# Patient Record
Sex: FEMALE | Race: WHITE | Employment: FULL TIME | URBAN - METROPOLITAN AREA
[De-identification: names, ages, dates, MRNs, and addresses within clinical notes are randomized per-mention and may not be internally consistent; named-entity substitution may affect disease eponyms.]

---

## 2021-02-03 ENCOUNTER — OFFICE VISIT (OUTPATIENT)
Dept: FAMILY MEDICINE CLINIC | Facility: CLINIC | Age: 31
End: 2021-02-03
Payer: COMMERCIAL

## 2021-02-03 VITALS
SYSTOLIC BLOOD PRESSURE: 122 MMHG | DIASTOLIC BLOOD PRESSURE: 76 MMHG | WEIGHT: 169.63 LBS | TEMPERATURE: 97 F | HEIGHT: 69 IN | HEART RATE: 86 BPM | BODY MASS INDEX: 25.12 KG/M2

## 2021-02-03 DIAGNOSIS — Z00.00 ROUTINE ADULT HEALTH MAINTENANCE: Primary | ICD-10-CM

## 2021-02-03 DIAGNOSIS — F32.A DEPRESSION, UNSPECIFIED DEPRESSION TYPE: ICD-10-CM

## 2021-02-03 DIAGNOSIS — M54.32 SCIATICA OF LEFT SIDE: ICD-10-CM

## 2021-02-03 PROCEDURE — 3008F BODY MASS INDEX DOCD: CPT | Performed by: FAMILY MEDICINE

## 2021-02-03 PROCEDURE — 3074F SYST BP LT 130 MM HG: CPT | Performed by: FAMILY MEDICINE

## 2021-02-03 PROCEDURE — 3078F DIAST BP <80 MM HG: CPT | Performed by: FAMILY MEDICINE

## 2021-02-03 PROCEDURE — 99385 PREV VISIT NEW AGE 18-39: CPT | Performed by: FAMILY MEDICINE

## 2021-02-03 RX ORDER — BUPROPION HYDROCHLORIDE 150 MG/1
150 TABLET, EXTENDED RELEASE ORAL 2 TIMES DAILY
COMMUNITY
End: 2021-02-03

## 2021-02-03 RX ORDER — BUPROPION HYDROCHLORIDE 150 MG/1
150 TABLET, EXTENDED RELEASE ORAL 2 TIMES DAILY
Qty: 180 TABLET | Refills: 3 | Status: SHIPPED | OUTPATIENT
Start: 2021-02-03 | End: 2021-11-04

## 2021-02-03 NOTE — PROGRESS NOTES
Gerry Bauer is a 31year old female.   Patient presents with:  Establish Care  Leg Pain: left leg pain x 2-3 months, a month ago pain worsened and has been bad ever since  Medication Request: patient needs refills      HPI:   Patient is a 67-year-old Pulse 86   Temp 97 °F (36.1 °C) (Temporal)   Ht 5' 9\" (1.753 m)   Wt 169 lb 9.6 oz (76.9 kg)   LMP 01/11/2021   BMI 25.05 kg/m²   GENERAL: well developed, well nourished,in no apparent distress  SKIN: no rashes,no suspicious lesions  HEENT: atraumatic, no

## 2021-02-04 ENCOUNTER — OFFICE VISIT (OUTPATIENT)
Dept: PHYSICAL THERAPY | Age: 31
End: 2021-02-04
Attending: FAMILY MEDICINE
Payer: COMMERCIAL

## 2021-02-04 DIAGNOSIS — M54.32 SCIATICA OF LEFT SIDE: ICD-10-CM

## 2021-02-04 PROCEDURE — 97162 PT EVAL MOD COMPLEX 30 MIN: CPT

## 2021-02-04 PROCEDURE — 97110 THERAPEUTIC EXERCISES: CPT

## 2021-02-04 NOTE — PROGRESS NOTES
SPINE EVALUATION:   Referring Physician: Dr. aBrbara Parmar  Diagnosis: Sciatica of left side (M54.32)      Date of Service: 2/4/2021     PATIENT Gina Pulido is a 31year old y/o female who presents to therapy today with complaints of L gluteal maria alejandra dysfunction versus lumbar derangement. Pt and PT discussed evaluation findings, pathology, POC and HEP. Pt voiced understanding and performs HEP correctly without reported pain.  Skilled Physical Therapy is medically necessary to address the above impairme limitations  PLAN OF CARE:    Goals: (to be met in 10 visits)     · Pt will improve transversus abdominis recruitment to perform proper isometric contraction without requiring verbal or tactile cuing to promote advancement of therex   · Pt will demonstrate

## 2021-02-09 ENCOUNTER — OFFICE VISIT (OUTPATIENT)
Dept: PHYSICAL THERAPY | Age: 31
End: 2021-02-09
Attending: FAMILY MEDICINE
Payer: COMMERCIAL

## 2021-02-09 PROCEDURE — 97110 THERAPEUTIC EXERCISES: CPT

## 2021-02-09 PROCEDURE — 97140 MANUAL THERAPY 1/> REGIONS: CPT

## 2021-02-09 NOTE — PROGRESS NOTES
Dx: Sciatica of left side (M54.32)         Insurance (Authorized # of Visits):  [employer]            Authorizing Physician: Dr. James Lopez  Next MD visit: TBD  Fall Risk: standard         Precautions: n/a             Subjective: Pt reports minimal changes thu (knee/octavio) 10x   Sdly clam shell IR with GTB 10x                 Manual:  MFR glut / lateral hip               HEP: Piriformis stretch (x2); hamstring stretch; prone press ups; clam shell (ER/IR)     Charges: 2TE; 1MM       Total Timed Treatment: 42 min

## 2021-02-11 ENCOUNTER — OFFICE VISIT (OUTPATIENT)
Dept: OBGYN CLINIC | Facility: CLINIC | Age: 31
End: 2021-02-11
Payer: COMMERCIAL

## 2021-02-11 ENCOUNTER — OFFICE VISIT (OUTPATIENT)
Dept: PHYSICAL THERAPY | Age: 31
End: 2021-02-11
Attending: FAMILY MEDICINE
Payer: COMMERCIAL

## 2021-02-11 VITALS
HEIGHT: 69 IN | BODY MASS INDEX: 25.18 KG/M2 | HEART RATE: 90 BPM | WEIGHT: 170 LBS | DIASTOLIC BLOOD PRESSURE: 84 MMHG | SYSTOLIC BLOOD PRESSURE: 137 MMHG

## 2021-02-11 DIAGNOSIS — F43.21 UNRESOLVED GRIEF: ICD-10-CM

## 2021-02-11 DIAGNOSIS — Z31.9 PATIENT DESIRES PREGNANCY: Primary | ICD-10-CM

## 2021-02-11 PROCEDURE — 3075F SYST BP GE 130 - 139MM HG: CPT | Performed by: ADVANCED PRACTICE MIDWIFE

## 2021-02-11 PROCEDURE — 97110 THERAPEUTIC EXERCISES: CPT

## 2021-02-11 PROCEDURE — 99203 OFFICE O/P NEW LOW 30 MIN: CPT | Performed by: ADVANCED PRACTICE MIDWIFE

## 2021-02-11 PROCEDURE — 3079F DIAST BP 80-89 MM HG: CPT | Performed by: ADVANCED PRACTICE MIDWIFE

## 2021-02-11 PROCEDURE — 97140 MANUAL THERAPY 1/> REGIONS: CPT

## 2021-02-11 PROCEDURE — 3008F BODY MASS INDEX DOCD: CPT | Performed by: ADVANCED PRACTICE MIDWIFE

## 2021-02-11 NOTE — PROGRESS NOTES
HPI:    Patient ID: Gerry Bauer is a 31year old female who presentdsfor consult  Regarding pregnancy planning. Pt is a  had a missed ab in November.   Had returned to menstruation in  .  Reports she is using an ovulation predictor kit and taki

## 2021-02-11 NOTE — PROGRESS NOTES
Dx: Sciatica of left side (M54.32)         Insurance (Authorized # of Visits):  [employer] PPO            Authorizing Physician: Dr. Ashlie Valles  Next MD visit: TBD  Fall Risk: standard         Precautions: n/a             Subjective: Pt reports better sitting.  How Sdly clam shell IR with GTB 10x    Ther ex:  Prone glut sets (heels) 2 x 10 5 cts  Prone passive ER/IR with applied pressure 10x  Supine SLR (ER) 2 x 10  L hamstring stretch with belt 3 x 20 cts  Bridging with SB 10x 5 cts  Piriformis stretch (crossed an

## 2021-02-16 ENCOUNTER — APPOINTMENT (OUTPATIENT)
Dept: PHYSICAL THERAPY | Age: 31
End: 2021-02-16
Attending: FAMILY MEDICINE
Payer: COMMERCIAL

## 2021-02-18 ENCOUNTER — APPOINTMENT (OUTPATIENT)
Dept: PHYSICAL THERAPY | Age: 31
End: 2021-02-18
Attending: FAMILY MEDICINE
Payer: COMMERCIAL

## 2021-02-23 ENCOUNTER — OFFICE VISIT (OUTPATIENT)
Dept: PHYSICAL THERAPY | Age: 31
End: 2021-02-23
Attending: FAMILY MEDICINE
Payer: COMMERCIAL

## 2021-02-23 PROCEDURE — 97110 THERAPEUTIC EXERCISES: CPT

## 2021-02-23 PROCEDURE — 97140 MANUAL THERAPY 1/> REGIONS: CPT

## 2021-02-23 NOTE — PROGRESS NOTES
Dx: Sciatica of left side (M54.32)         Insurance (Authorized # of Visits):  [employer] PPO            Authorizing Physician: Dr. Robbie Elizabeth  Next MD visit: TBD  Fall Risk: standard         Precautions: n/a             Subjective: Pt reports her sitting is bett cts  Bridging with pilates [place] (heel dig) 10x 5cts  Piriformis stretch 3 x 20 cts  TrA activation 10 x 5  cts  Supine hip circles CW/CCW 10x  Sdly clam shell with GTB (knee/octavio) 10x   Sdly clam shell IR with GTB 10x    Ther ex:  Prone glut sets (heels)

## 2021-02-25 ENCOUNTER — OFFICE VISIT (OUTPATIENT)
Dept: PHYSICAL THERAPY | Age: 31
End: 2021-02-25
Attending: FAMILY MEDICINE
Payer: COMMERCIAL

## 2021-02-25 PROCEDURE — 97110 THERAPEUTIC EXERCISES: CPT

## 2021-02-25 NOTE — PROGRESS NOTES
Dx: Sciatica of left side (M54.32)         Insurance (Authorized # of Visits):  [employer] PPO            Authorizing Physician: Dr. Kimberly Guzmán  Next MD visit: TBD  Fall Risk: standard         Precautions: n/a             Subjective: Pt notes symptoms same as last (knee/octavio) 10x   Sdly clam shell IR with GTB 10x    Ther ex:  Prone glut sets (heels) 2 x 10 5 cts  Prone passive ER/IR with applied pressure 10x  Supine SLR (ER) 2 x 10  L hamstring stretch with belt 3 x 20 cts  Bridging with SB 10x 5 cts  Piriformis str

## 2021-03-02 ENCOUNTER — OFFICE VISIT (OUTPATIENT)
Dept: PHYSICAL THERAPY | Age: 31
End: 2021-03-02
Attending: FAMILY MEDICINE
Payer: COMMERCIAL

## 2021-03-02 PROCEDURE — 97110 THERAPEUTIC EXERCISES: CPT

## 2021-03-02 NOTE — PROGRESS NOTES
Dx: Sciatica of left side (M54.32)         Insurance (Authorized # of Visits):  [employer] PPO            Authorizing Physician: Dr. Ashlie Valles  Next MD visit: TBD  Fall Risk: standard         Precautions: n/a             Subjective: Pt notes \"feeling the best\" R/L 2 x 10  L hamstring stretch with belt 3 x 20 cts  Bridging with pilates [place] (heel dig) 10x 5cts  Piriformis stretch 3 x 20 cts  TrA activation 10 x 5  cts  Supine hip circles CW/CCW 10x  Sdly clam shell with GTB (knee/octavio) 10x   Sdly clam shell IR glut / lateral hip  Manual:  MFR glut / lateral hip  Manual:  MFR glut / lateral hip Manual: NA Manual: NA          HEP: Piriformis stretch (x2); hamstring stretch; prone press ups; clam shell (ER/IR)     Charges: 3TE      Total Timed Treatment: 45 min  To

## 2021-03-04 ENCOUNTER — OFFICE VISIT (OUTPATIENT)
Dept: PHYSICAL THERAPY | Age: 31
End: 2021-03-04
Attending: FAMILY MEDICINE
Payer: COMMERCIAL

## 2021-03-04 PROCEDURE — 97112 NEUROMUSCULAR REEDUCATION: CPT

## 2021-03-04 PROCEDURE — 97110 THERAPEUTIC EXERCISES: CPT

## 2021-03-04 NOTE — PROGRESS NOTES
Dx: Sciatica of left side (M54.32)         Insurance (Authorized # of Visits):  [employer]ane PPO            Authorizing Physician: Dr. Juany Morales  Next MD visit: TBD  Fall Risk: standard         Precautions: n/a             Subjective: Pt reports slight return of pa 15x  Standing hip flex/abd (aeromat) R/L 15x  [place] stretch 3 x 20 cts (block)     Ther ex:  SciFit L1 distance 8 x 6 min   Foam roller to L hip 3 x 30 cts  Standing hip ext with ER at 45 deg 2x10  Lunges bosu (hip ext) R/L L10x  Lunges bosu (knee flex) R

## 2021-03-11 ENCOUNTER — OFFICE VISIT (OUTPATIENT)
Dept: PHYSICAL THERAPY | Age: 31
End: 2021-03-11
Attending: FAMILY MEDICINE
Payer: COMMERCIAL

## 2021-03-11 PROCEDURE — 97035 APP MDLTY 1+ULTRASOUND EA 15: CPT

## 2021-03-11 PROCEDURE — 97110 THERAPEUTIC EXERCISES: CPT

## 2021-03-12 NOTE — PROGRESS NOTES
Dx: Sciatica of left side (M54.32)         Insurance (Authorized # of Visits):  [employer] PPO            Authorizing Physician: Dr. Dee Dee Cox  Next MD visit: TBD  Fall Risk: standard         Precautions: n/a             Subjective: Pt reports no significant garrett 3/11/21  Tx#: 8   Ther ex:  Prone glut sets (heels) 2 x 10 5 cts  Prone passive ER/IR with applied PSIS pressure 10x  Prone hip ext R/L 2 x 10  Prone donkey kicks 2 x 10  L hamstring stretch with belt 3 x 20 cts  Bridging with SB 15x 5 cts  Brandon hamstri deg, eccentric SLR) 10x  Supine hip circles CW/CCW 20x            Manual:  MFR glut / lateral hip Manual: NA Manual: NA Manual: NA US: 1.0 w/cm2 applied to L medial ischial tuberosity (location of pain with palpation) x 10 min           HEP: Piriformis str

## 2021-03-16 ENCOUNTER — OFFICE VISIT (OUTPATIENT)
Dept: FAMILY MEDICINE CLINIC | Facility: CLINIC | Age: 31
End: 2021-03-16
Payer: COMMERCIAL

## 2021-03-16 VITALS
HEART RATE: 85 BPM | BODY MASS INDEX: 25.6 KG/M2 | WEIGHT: 172.81 LBS | HEIGHT: 69 IN | SYSTOLIC BLOOD PRESSURE: 118 MMHG | DIASTOLIC BLOOD PRESSURE: 78 MMHG | TEMPERATURE: 97 F

## 2021-03-16 DIAGNOSIS — M79.605 LEG PAIN, POSTERIOR, LEFT: Primary | ICD-10-CM

## 2021-03-16 PROCEDURE — 3008F BODY MASS INDEX DOCD: CPT | Performed by: FAMILY MEDICINE

## 2021-03-16 PROCEDURE — 99212 OFFICE O/P EST SF 10 MIN: CPT | Performed by: FAMILY MEDICINE

## 2021-03-16 PROCEDURE — 3078F DIAST BP <80 MM HG: CPT | Performed by: FAMILY MEDICINE

## 2021-03-16 PROCEDURE — 3074F SYST BP LT 130 MM HG: CPT | Performed by: FAMILY MEDICINE

## 2021-03-17 ENCOUNTER — TELEPHONE (OUTPATIENT)
Dept: PHYSICAL THERAPY | Age: 31
End: 2021-03-17

## 2021-03-17 PROBLEM — M79.605 LEG PAIN, POSTERIOR, LEFT: Status: ACTIVE | Noted: 2021-03-17

## 2021-03-17 NOTE — PROGRESS NOTES
Catracho Grossman is a 31year old female. Patient presents with:  Leg Pain: left leg pain that has not resolved with 8 sessions of physical therapy       HPI:   Patient is a 71-year-old female who presents today for follow-up of posterior left leg pain. Ht 5' 9\" (1.753 m)   Wt 172 lb 12.8 oz (78.4 kg)   LMP 03/02/2021 (Exact Date)   BMI 25.52 kg/m²   GENERAL: well developed, well nourished,in no apparent distress        ASSESSMENT AND PLAN:   1.  Leg pain, posterior, left  I want to send patient to physia

## 2021-03-22 ENCOUNTER — OFFICE VISIT (OUTPATIENT)
Dept: NEUROLOGY | Facility: CLINIC | Age: 31
End: 2021-03-22
Payer: COMMERCIAL

## 2021-03-22 ENCOUNTER — TELEPHONE (OUTPATIENT)
Dept: NEUROLOGY | Facility: CLINIC | Age: 31
End: 2021-03-22

## 2021-03-22 VITALS
HEART RATE: 96 BPM | BODY MASS INDEX: 25.18 KG/M2 | DIASTOLIC BLOOD PRESSURE: 88 MMHG | HEIGHT: 69 IN | OXYGEN SATURATION: 97 % | SYSTOLIC BLOOD PRESSURE: 120 MMHG | WEIGHT: 170 LBS

## 2021-03-22 DIAGNOSIS — M54.16 CHRONIC LEFT LUMBAR RADICULOPATHY: Primary | ICD-10-CM

## 2021-03-22 PROCEDURE — 3074F SYST BP LT 130 MM HG: CPT | Performed by: PHYSICAL MEDICINE & REHABILITATION

## 2021-03-22 PROCEDURE — 3079F DIAST BP 80-89 MM HG: CPT | Performed by: PHYSICAL MEDICINE & REHABILITATION

## 2021-03-22 PROCEDURE — 99202 OFFICE O/P NEW SF 15 MIN: CPT | Performed by: PHYSICAL MEDICINE & REHABILITATION

## 2021-03-22 PROCEDURE — 3008F BODY MASS INDEX DOCD: CPT | Performed by: PHYSICAL MEDICINE & REHABILITATION

## 2021-03-22 NOTE — PROGRESS NOTES
[address]  NEW PATIENT EVALUATION    Consultation as a request of Dr. Uday Baeza    Chief Complaint: back pain.     HISTORY OF PRESENT ILLNESS:   Patient presents with:  Leg Pain: New right handed patient comes i • Sertraline HCl 50 MG Oral Tab Take 1 tablet (50 mg total) by mouth daily. 90 tablet 3   • buPROPion HCl ER, SR, 150 MG Oral Tablet 12 Hr Take 1 tablet (150 mg total) by mouth 2 (two) times daily. (Patient taking differently: Take 150 mg by mouth daily. movements intact. Ears: No auricular hematoma or deformities  Mouth: No lesions or ulcerations  Heart: peripheral pulses intact. Normal capillary refill.    Lungs: Non-labored respirations  Abdomen: No abdominal guarding  Extremities: No lower extremity e lumbar radiculopathy. Patient has some weakness with EHL testing. She has attended physical therapy and been doing home exercises. She has tried oral medications without any improvement.   Given the persistent nature of her symptoms with some weakness on

## 2021-03-22 NOTE — TELEPHONE ENCOUNTER
Contacted Janae MONTGOMERY at [employer] to initiate authorization for L-Spine MRI CPT 34571 dx:M54.16 to be done at [address] [place] [address].     Status: Approved with order # [id number] valid 3/22/21-4/20/21    Patient notified of the above and was transferred to [address] [place] [address] central scheduling

## 2021-03-27 ENCOUNTER — HOSPITAL ENCOUNTER (OUTPATIENT)
Dept: MRI IMAGING | Facility: HOSPITAL | Age: 31
Discharge: HOME | End: 2021-03-27
Attending: PHYSICAL MEDICINE & REHABILITATION
Payer: COMMERCIAL

## 2021-03-27 DIAGNOSIS — M54.16 CHRONIC LEFT LUMBAR RADICULOPATHY: ICD-10-CM

## 2021-03-27 PROCEDURE — 72148 MRI LUMBAR SPINE W/O DYE: CPT | Performed by: PHYSICAL MEDICINE & REHABILITATION

## 2021-03-29 ENCOUNTER — TELEPHONE (OUTPATIENT)
Dept: NEUROLOGY | Facility: CLINIC | Age: 31
End: 2021-03-29

## 2021-03-29 NOTE — TELEPHONE ENCOUNTER
----- Message from Henrique Almodovar DO sent at 3/29/2021  9:19 AM CDT -----  MRI shows 2 disc herniations at L4-5 and L5-S1. Please have patient follow up as discussed.  Thanks

## 2021-03-29 NOTE — TELEPHONE ENCOUNTER
S/w patient she has a scheduled appt on 4/9/21 for f/u for MRI. Patient verbalized understanding with no questions at this time.

## 2021-04-09 ENCOUNTER — TELEPHONE (OUTPATIENT)
Dept: NEUROLOGY | Facility: CLINIC | Age: 31
End: 2021-04-09

## 2021-04-09 ENCOUNTER — TELEMEDICINE (OUTPATIENT)
Dept: NEUROLOGY | Facility: CLINIC | Age: 31
End: 2021-04-09

## 2021-04-09 DIAGNOSIS — M54.16 CHRONIC LEFT LUMBAR RADICULOPATHY: Primary | ICD-10-CM

## 2021-04-09 PROCEDURE — 99214 OFFICE O/P EST MOD 30 MIN: CPT | Performed by: PHYSICAL MEDICINE & REHABILITATION

## 2021-04-09 NOTE — TELEPHONE ENCOUNTER
[employer] for authorization of approval for Left L5 and S1 Transforaminal Epidural Steroid Injection under fluoroscopy cpt codes 50235-OB, 61987-SC, 43102. Authorization is not required. Transaction ID: [id number]. Will inform Nursing.

## 2021-04-09 NOTE — PROGRESS NOTES
[address]    Telemedicine Visit - Follow Up Evaluation    Telehealth Verbal Consent   I conducted a telehealth visit with Carol Salguero today, 04/09/21, which was completed using two-way, real-ti has persistent numbness and tingling. Pain is rated 4 out of 5 in the leg. She denies any low back pain. She denies any changes in strength or bowel bladder habits. Her numbness and tingling sensation has slightly worsened.   She denies any specific wea No results found for: EAG, A1C  No results found for: WBC, RBC, HGB, HCT, MCV, MCH, MCHC, RDW, PLT, MPV  No results found for: GLU, BUN, BUNCREA, CREATSERUM, ANIONGAP, GFR, GFRNAA, GFRAA, CA, OSMOCALC, ALKPHO, AST, ALT, ALKPHOS, BILT, TP, ALB, GLOBULT, G also discussed that NSAIDs may mask or worsen COVID-19 infection symptoms. The patient was also informed that corticosteroids, in any form, may significantly decrease immune response and may increase risk and complications of infection.     The patient was

## 2021-04-09 NOTE — TELEPHONE ENCOUNTER
Patient has been scheduled for a Left L5 and S1 Transforaminal Epidural Steroid Injection under fluoroscopy guidance under local anesthesia on 05/03/21 at the [place]. Medications and allergies reviewed.  Patient informed we will need verbal or written authori

## 2021-05-03 ENCOUNTER — OFFICE VISIT (OUTPATIENT)
Dept: SURGERY | Facility: CLINIC | Age: 31
End: 2021-05-03

## 2021-05-03 DIAGNOSIS — M54.16 LUMBAR RADICULOPATHY, CHRONIC: Primary | ICD-10-CM

## 2021-05-03 PROCEDURE — 64483 NJX AA&/STRD TFRM EPI L/S 1: CPT | Performed by: PHYSICAL MEDICINE & REHABILITATION

## 2021-05-03 PROCEDURE — 64484 NJX AA&/STRD TFRM EPI L/S EA: CPT | Performed by: PHYSICAL MEDICINE & REHABILITATION

## 2021-05-03 NOTE — PROCEDURES
Procedure note: Transforaminal Epidural Steroid Injection                    Informed Consent Obtained by: Madonna CORDOBA Procedure: TRANSFORAMINAL EPIDURAL STEROID INJECTION UNDER FLUOROSCOPY OF Left L5-S1 and S1.      Pre-operative Diagnosis: Lumbar Anesthesia: Local  IVF: None  Complications: None     Recovery:  Patient was monitored for 30minutes in recovery with regular VS, pulse ox monitoring.   Patient was moving all extremities and able to ambulate, and patient was given discharge instructions

## 2021-05-28 ENCOUNTER — TELEMEDICINE (OUTPATIENT)
Dept: NEUROLOGY | Facility: CLINIC | Age: 31
End: 2021-05-28

## 2021-05-28 DIAGNOSIS — M54.16 CHRONIC LEFT-SIDED LUMBAR RADICULOPATHY: Primary | ICD-10-CM

## 2021-05-28 DIAGNOSIS — M79.605 LEG PAIN, POSTERIOR, LEFT: ICD-10-CM

## 2021-05-28 PROCEDURE — 99214 OFFICE O/P EST MOD 30 MIN: CPT | Performed by: PHYSICAL MEDICINE & REHABILITATION

## 2021-05-28 RX ORDER — GABAPENTIN 300 MG/1
CAPSULE ORAL
Qty: 90 CAPSULE | Refills: 0 | Status: SHIPPED | OUTPATIENT
Start: 2021-05-28 | End: 2021-06-17

## 2021-05-28 NOTE — PROGRESS NOTES
[address]    Telemedicine Visit - Follow Up Evaluation    Telehealth Verbal Consent   I conducted a telehealth visit with Allyson You today, 05/28/21, which was completed using two-way, real-ti medication for pain at this time and has not had any recent physical therapy. She still endorses pain that occurs in the same area on the left low back and in the leg.   Occasionally the pain can be a little more severe however for the most part the pain i External appearance identifies normal appearance without obvious deformity  Cardiovascular: No cyanosis, clubbing or edema  Respiratory: Non-labored respirations  Skin: No lesions noted   Neurological: alert & oriented x 3, attentive, able to follow comman we can reevaluate her symptoms and consider repeat epidural injection if needed at that time. Since she has progressed quite well, I would like to monitor her progress with further therapy and medication.     Follow-up:  1 month    We discussed that a tele

## 2021-06-01 ENCOUNTER — TELEPHONE (OUTPATIENT)
Dept: NEUROLOGY | Facility: CLINIC | Age: 31
End: 2021-06-01

## 2021-06-14 ENCOUNTER — PATIENT MESSAGE (OUTPATIENT)
Dept: OBGYN CLINIC | Facility: CLINIC | Age: 31
End: 2021-06-14

## 2021-06-16 NOTE — TELEPHONE ENCOUNTER
She can make an appointment with me and we can start a work up and discuss options  Virtual or in person is fine

## 2021-06-16 NOTE — TELEPHONE ENCOUNTER
From: Moe Mesa  To: Linden Valero CNM  Sent: 6/14/2021 4:07 PM CDT  Subject: Non-Urgent Medical Question    Bon Chu, I am approaching 1 year of trying to conceive (started July 2020).  I did become pregnant in October 2020 but miscarried in

## 2021-06-17 ENCOUNTER — LAB ENCOUNTER (OUTPATIENT)
Dept: LAB | Age: 31
End: 2021-06-17
Attending: ADVANCED PRACTICE MIDWIFE
Payer: COMMERCIAL

## 2021-06-17 ENCOUNTER — OFFICE VISIT (OUTPATIENT)
Dept: OBGYN CLINIC | Facility: CLINIC | Age: 31
End: 2021-06-17
Payer: COMMERCIAL

## 2021-06-17 VITALS
HEART RATE: 75 BPM | SYSTOLIC BLOOD PRESSURE: 136 MMHG | BODY MASS INDEX: 26 KG/M2 | DIASTOLIC BLOOD PRESSURE: 86 MMHG | WEIGHT: 175 LBS

## 2021-06-17 DIAGNOSIS — N97.9 FEMALE INFERTILITY: ICD-10-CM

## 2021-06-17 DIAGNOSIS — Z31.9 PATIENT DESIRES PREGNANCY: Primary | ICD-10-CM

## 2021-06-17 DIAGNOSIS — Z31.9 PATIENT DESIRES PREGNANCY: ICD-10-CM

## 2021-06-17 PROCEDURE — 83520 IMMUNOASSAY QUANT NOS NONAB: CPT

## 2021-06-17 PROCEDURE — 83001 ASSAY OF GONADOTROPIN (FSH): CPT

## 2021-06-17 PROCEDURE — 82670 ASSAY OF TOTAL ESTRADIOL: CPT

## 2021-06-17 PROCEDURE — 84443 ASSAY THYROID STIM HORMONE: CPT

## 2021-06-17 PROCEDURE — 36415 COLL VENOUS BLD VENIPUNCTURE: CPT

## 2021-06-17 PROCEDURE — 83002 ASSAY OF GONADOTROPIN (LH): CPT

## 2021-06-17 PROCEDURE — 99402 PREV MED CNSL INDIV APPRX 30: CPT | Performed by: ADVANCED PRACTICE MIDWIFE

## 2021-06-17 PROCEDURE — 3075F SYST BP GE 130 - 139MM HG: CPT | Performed by: ADVANCED PRACTICE MIDWIFE

## 2021-06-17 PROCEDURE — 3079F DIAST BP 80-89 MM HG: CPT | Performed by: ADVANCED PRACTICE MIDWIFE

## 2021-06-17 RX ORDER — OVULATION TEST KIT
1 KIT MISCELLANEOUS DAILY
Qty: 1 EACH | Refills: 0 | Status: SHIPPED | OUTPATIENT
Start: 2021-06-17 | End: 2021-11-04

## 2021-06-17 NOTE — PROGRESS NOTES
HPI/Subjective:   Patient ID: Imtiaz Guzmán is a 31year old female who desires pregnancy  Pt is a  with hx of SAB  at 8 weeks D&C  in Oct.  [facility] 2021  Began menstruation at age 6 y/o on oral contraceptives from age [age].  Has been off initial workup and possible treatment here in the office versus direct referral to reproductive endocrinology. Discussed that DELFINA may have more successful treatment options.   Discussed need for semen analysis since 40% of fertility issues may be male fact

## 2021-06-29 ENCOUNTER — TELEPHONE (OUTPATIENT)
Dept: OBGYN CLINIC | Facility: CLINIC | Age: 31
End: 2021-06-29

## 2021-06-29 DIAGNOSIS — Z31.9 PATIENT DESIRES PREGNANCY: Primary | ICD-10-CM

## 2021-06-29 NOTE — TELEPHONE ENCOUNTER
Spoke with pt advised lab order for progesterone has been entered. Pt advised she should complete on day 21 of her cycle. Pt agreed and voiced understanding.

## 2021-07-02 ENCOUNTER — LAB ENCOUNTER (OUTPATIENT)
Dept: LAB | Age: 31
End: 2021-07-02
Attending: ADVANCED PRACTICE MIDWIFE
Payer: COMMERCIAL

## 2021-07-02 DIAGNOSIS — Z31.9 PATIENT DESIRES PREGNANCY: ICD-10-CM

## 2021-07-02 LAB — PROGEST SERPL-MCNC: 5.73 NG/ML

## 2021-07-02 PROCEDURE — 36415 COLL VENOUS BLD VENIPUNCTURE: CPT

## 2021-07-02 PROCEDURE — 84144 ASSAY OF PROGESTERONE: CPT

## 2021-07-06 ENCOUNTER — TELEPHONE (OUTPATIENT)
Dept: OBGYN CLINIC | Facility: CLINIC | Age: 31
End: 2021-07-06

## 2021-07-06 NOTE — TELEPHONE ENCOUNTER
Notified pt of results & instructions per MES. appt offered, accepted & scheduled.  Pt verbalized an understanding & agrees w/ plan

## 2021-07-06 NOTE — TELEPHONE ENCOUNTER
----- Message from Cheryl Carrizales CNM sent at 7/6/2021 12:24 PM CDT -----  Lab results are as follows:    Day 21 Progesterone was 5.73 since it was >3 ng/ml that suggests that ovulation occurred    Please offer a follow up visit if she desires

## 2021-07-13 ENCOUNTER — OFFICE VISIT (OUTPATIENT)
Dept: OBGYN CLINIC | Facility: CLINIC | Age: 31
End: 2021-07-13
Payer: COMMERCIAL

## 2021-07-13 VITALS
HEIGHT: 69 IN | HEART RATE: 78 BPM | BODY MASS INDEX: 26.66 KG/M2 | WEIGHT: 180 LBS | SYSTOLIC BLOOD PRESSURE: 132 MMHG | DIASTOLIC BLOOD PRESSURE: 81 MMHG

## 2021-07-13 DIAGNOSIS — N97.9 FEMALE INFERTILITY: Primary | ICD-10-CM

## 2021-07-13 DIAGNOSIS — Z31.9 PATIENT DESIRES PREGNANCY: ICD-10-CM

## 2021-07-13 PROBLEM — Z87.59 HISTORY OF MISCARRIAGE: Status: ACTIVE | Noted: 2021-07-13

## 2021-07-13 PROCEDURE — 99213 OFFICE O/P EST LOW 20 MIN: CPT | Performed by: ADVANCED PRACTICE MIDWIFE

## 2021-07-13 PROCEDURE — 3075F SYST BP GE 130 - 139MM HG: CPT | Performed by: ADVANCED PRACTICE MIDWIFE

## 2021-07-13 PROCEDURE — 3079F DIAST BP 80-89 MM HG: CPT | Performed by: ADVANCED PRACTICE MIDWIFE

## 2021-07-13 PROCEDURE — 3008F BODY MASS INDEX DOCD: CPT | Performed by: ADVANCED PRACTICE MIDWIFE

## 2021-07-13 NOTE — PROGRESS NOTES
HPI/Subjective:   Patient ID: Dee Brooks is a 31year old female who presents for infertility evaluation. Pt  had a semen analysis. Pt has a hx of a SAB with a D&C. LMP 7/7/21 reports an [facility] surge 6/23.       HPI    History/Other:   Rev

## 2021-08-25 ENCOUNTER — PATIENT MESSAGE (OUTPATIENT)
Dept: FAMILY MEDICINE CLINIC | Facility: CLINIC | Age: 31
End: 2021-08-25

## 2021-08-26 NOTE — TELEPHONE ENCOUNTER
From: Edgar Mesa  To: Bill Prince MD  Sent: 8/25/2021 4:19 PM CDT  Subject: Other    Hi Dr. Vale Krishnan, I am beginning fertility treatments with Dr. Quirino Burrows at [facility] and they would like clearance from my prescribing docto

## 2021-08-31 ENCOUNTER — TELEPHONE (OUTPATIENT)
Dept: OBGYN CLINIC | Facility: CLINIC | Age: 31
End: 2021-08-31

## 2021-08-31 ENCOUNTER — OFFICE VISIT (OUTPATIENT)
Dept: OBGYN CLINIC | Facility: CLINIC | Age: 31
End: 2021-08-31
Payer: COMMERCIAL

## 2021-08-31 VITALS
HEART RATE: 73 BPM | BODY MASS INDEX: 26 KG/M2 | SYSTOLIC BLOOD PRESSURE: 127 MMHG | WEIGHT: 178.38 LBS | DIASTOLIC BLOOD PRESSURE: 87 MMHG

## 2021-08-31 DIAGNOSIS — N97.9 FEMALE INFERTILITY: ICD-10-CM

## 2021-08-31 DIAGNOSIS — Z31.9 PATIENT DESIRES PREGNANCY: Primary | ICD-10-CM

## 2021-08-31 PROCEDURE — 3079F DIAST BP 80-89 MM HG: CPT | Performed by: ADVANCED PRACTICE MIDWIFE

## 2021-08-31 PROCEDURE — 3074F SYST BP LT 130 MM HG: CPT | Performed by: ADVANCED PRACTICE MIDWIFE

## 2021-08-31 PROCEDURE — 99212 OFFICE O/P EST SF 10 MIN: CPT | Performed by: ADVANCED PRACTICE MIDWIFE

## 2021-08-31 NOTE — PROGRESS NOTES
HPI/Subjective:   Patient ID: Froilan Friday is a 31year old female who presents for a breast exam as requested by her DELFINA. Pt started Clomid today  Denies any complaints. HPI    History/Other:   Review of Systems   Constitutional: Negative. Patient desires pregnancy  (primary encounter diagnosis)  Female infertility  F/U PRN  No orders of the defined types were placed in this encounter.       Meds This Visit:  Requested Prescriptions      No prescriptions requested or ordered in this encount

## 2021-08-31 NOTE — TELEPHONE ENCOUNTER
Exam note from today's visit with MES faxed to [facility] at [phone] as requested by patient.

## 2021-09-21 ENCOUNTER — PATIENT MESSAGE (OUTPATIENT)
Dept: FAMILY MEDICINE CLINIC | Facility: CLINIC | Age: 31
End: 2021-09-21

## 2021-09-21 NOTE — TELEPHONE ENCOUNTER
From: Marco Mesa  To: Junito Arellano MD  Sent: 9/21/2021 9:53 AM CDT  Subject: Medication question with pregnancy    Hi Dr. Tyson Bravo,    Good news - I just found out today that I got pregnant on my first treatment cycle with the fertility cl

## 2021-11-02 ENCOUNTER — TELEPHONE (OUTPATIENT)
Dept: OBGYN CLINIC | Facility: CLINIC | Age: 31
End: 2021-11-02

## 2021-11-02 NOTE — TELEPHONE ENCOUNTER
Pt states she needs to schedule nurse education visit, states she is transferring from fertility clinic.  Please advise

## 2021-11-03 ENCOUNTER — OFFICE VISIT (OUTPATIENT)
Dept: OBGYN CLINIC | Facility: CLINIC | Age: 31
End: 2021-11-03
Payer: COMMERCIAL

## 2021-11-03 VITALS
BODY MASS INDEX: 25.62 KG/M2 | SYSTOLIC BLOOD PRESSURE: 139 MMHG | WEIGHT: 173 LBS | HEART RATE: 111 BPM | HEIGHT: 69 IN | DIASTOLIC BLOOD PRESSURE: 83 MMHG

## 2021-11-03 DIAGNOSIS — N92.6 MISSED MENSES: Primary | ICD-10-CM

## 2021-11-03 PROCEDURE — 81025 URINE PREGNANCY TEST: CPT | Performed by: ADVANCED PRACTICE MIDWIFE

## 2021-11-03 PROCEDURE — 3008F BODY MASS INDEX DOCD: CPT | Performed by: ADVANCED PRACTICE MIDWIFE

## 2021-11-03 PROCEDURE — 99214 OFFICE O/P EST MOD 30 MIN: CPT | Performed by: ADVANCED PRACTICE MIDWIFE

## 2021-11-03 PROCEDURE — 3075F SYST BP GE 130 - 139MM HG: CPT | Performed by: ADVANCED PRACTICE MIDWIFE

## 2021-11-03 PROCEDURE — 3079F DIAST BP 80-89 MM HG: CPT | Performed by: ADVANCED PRACTICE MIDWIFE

## 2021-11-04 ENCOUNTER — NURSE ONLY (OUTPATIENT)
Dept: OBGYN CLINIC | Facility: CLINIC | Age: 31
End: 2021-11-04

## 2021-11-04 VITALS — WEIGHT: 173 LBS | BODY MASS INDEX: 26 KG/M2

## 2021-11-04 DIAGNOSIS — F32.89 OTHER DEPRESSION: ICD-10-CM

## 2021-11-04 DIAGNOSIS — F41.9 ANXIETY: ICD-10-CM

## 2021-11-04 DIAGNOSIS — Z34.90 PREGNANCY, UNSPECIFIED GESTATIONAL AGE: Primary | ICD-10-CM

## 2021-11-04 RX ORDER — DOXYLAMINE SUCCINATE AND PYRIDOXINE HYDROCHLORIDE 20; 20 MG/1; MG/1
1 TABLET, EXTENDED RELEASE ORAL NIGHTLY
COMMUNITY
Start: 2021-10-25 | End: 2021-12-22

## 2021-11-04 NOTE — PATIENT INSTRUCTIONS
Healthy Eating Habits During Pregnancy    It’s important to develop healthy eating habits while you are pregnant, for you as well as for your baby. Here are some ways to stay healthy.   Aim for a healthy weight  A slow, steady rate of weight gain is often tilefish, and albacore tuna  Things to limit  Ask your healthcare provider whether it’s safe to eat or drink:  · Caffeine  · Artificial sweeteners  · Organ meats  · Certain types of fish  · Fish and shellfish that contain mercury in lower amounts, like shr beans  1 tablespoon peanut butter  1/2 ounce nuts Fluids  8 or more 8-ounce glasses  Examples:  Water  Diluted juices: Apple, orange, cranberry  Mineral water  Clear soups, broth     *Note: Choose whole grains whenever possible.   ** Note: Try to choose low Planning Your Exercise Routine    While you’re pregnant, an exercise routine helps both your mind and your body feel good. It tones your muscles and makes them stronger. It also gives you and your baby more oxygen.   The right exercise for you  Overall cond follow:  · Park the car farther from a store and walk. · If you can, do errands on foot instead of driving. · Walk across the office to talk to someone in person instead of calling.   · While waiting for appointments, go up and down stairs or around the b started:  · Choose a time and place to exercise each day. · Wear loose-fitting clothes and comfortable athletic shoes. · Stretch before and after you exercise.  (Be sure to stretch slowly and to hold stretches for 30 to 40 seconds.)  Be active  Unless you healthcare provider  Call your healthcare provider right away if you have:  · Shortness of breath before starting exercise  · Vaginal bleeding  · Dizziness or feeling faint  · Chest pain  · Headache  · Decreased fetal movement  ·  contractions   · M foods. Make sure you get enough calcium, protein, and carbohydrates. · Don’t skip meals. · Eat healthy snacks. · Pick nutrient-dense, high-calorie healthy food like trail mix or protein shakes. · See a dietitian for help.   · Talk to your healthcare pro birth. Talk with your dentist or healthcare provider if you have concerns. Keeping a healthy mouth  · Brush twice daily with fluoride toothpaste. Floss at least once a day.   · If you have morning sickness, rinse your mouth with a teaspoon of baking soda m first trimester, your baby has formed all of its major body organs and weighs just over an ounce. Actual size of baby is 1/4\"    Month 1 (weeks 1 to 4)  The placenta (the organ that nourishes your baby) begins to form.  The brain, spinal cord, heart, g stop. If you need help, talk with your healthcare provider:  · Smoking increases the risk of stillbirth or having a low-birth-weight baby. If you smoke, quit now.   · Alcohol and drugs have been linked with miscarriage, birth defects, intellectual disabilit first trimester, due to nausea and fatigue. In the second trimester, sex may be very enjoyable. The third trimester can be a challenge comfort-wise. Try different positions and see what’s best for you both.   Khushboo last reviewed this educational content wear shoes with flexible soles. Third trimester tips  Reducing heartburn  · Eat small, light meals throughout the day rather than 3 large ones. · Sleep with your upper body raised 6 inches. Don’t lie down until 2 hours after you eat.   · Don't eat greasy, any trimester.  Here are some other causes:  · Implantation of the embryo on the uterine wall  · Subchorionic hemorrhage (bleeding between the sac membrane and the uterus)  · Miscarriage  · Ectopic (tubal) pregnancy  If you notice spotting  Spotting (very l like a period? · Is the blood bright red or brownish? · Have you had sexual intercourse recently? · Have you had pain or cramping? · Have you felt dizzy or faint? Monitoring your pregnancy  Bleeding will often stop as quickly as it began.  Your pregnan you have pain or bleeding or your healthcare provider tells you to stop. Even minor falls won’t cause a miscarriage. Miscarriages happen because things were not developing as they were supposed to. No medicine can prevent a miscarriage.    Ectopic pregnancy internal bleeding.  If this happens, you may have:   · Sudden severe pain in your lower abdomen  · Vaginal bleeding  · Weakness, dizziness, and sometimes fainting  If any of these symptoms occur:  · Call 911or return right away to the hospital.  · Don't dri professional medical care. Always follow your healthcare professional's instructions.

## 2021-11-04 NOTE — PROGRESS NOTES
Nurse education complete via phone visit. Pt instructed to  folder & handouts at next visit. NOB labs ordered. Pt desires NT ultrasound w/ Cell free DNA. Order placed, predetermination submitted & phone # to schedule given to pt. Last pap in epic.  D

## 2021-11-04 NOTE — PROGRESS NOTES
CHIEF COMPLAINT:  Patient presents with:  Consult: missed menses. JESUS 05/31/22       HPI:  Epi Cisneros is 27year old female, here today for a missed menses visit.  She is here with her partner, released yesterday from fertility clinic (IUI pregnancy) and is se Constitutional: Positive for fatigue. Negative for chills and fever. Gastrointestinal: Positive for nausea and vomiting. Negative for diarrhea. Genitourinary: Negative for pelvic pain and vaginal bleeding (noted light spotting weeks ago).    Psychiatr Discussed importance of folic acid, calcium, vitamin D  -- Reviewed pregnancy recommendations regarding weight gain, diet, safe food preparation and consumption  -- Travel discussed, avoid travel to zika and COVID zones, use of support stockings with fligh

## 2021-11-05 ENCOUNTER — LAB ENCOUNTER (OUTPATIENT)
Dept: LAB | Age: 31
End: 2021-11-05
Attending: ADVANCED PRACTICE MIDWIFE
Payer: COMMERCIAL

## 2021-11-05 DIAGNOSIS — F32.89 OTHER DEPRESSION: ICD-10-CM

## 2021-11-05 DIAGNOSIS — Z34.90 PREGNANCY, UNSPECIFIED GESTATIONAL AGE: ICD-10-CM

## 2021-11-05 DIAGNOSIS — F41.9 ANXIETY: ICD-10-CM

## 2021-11-05 PROCEDURE — 86780 TREPONEMA PALLIDUM: CPT

## 2021-11-05 PROCEDURE — 86803 HEPATITIS C AB TEST: CPT

## 2021-11-05 PROCEDURE — 87389 HIV-1 AG W/HIV-1&-2 AB AG IA: CPT

## 2021-11-05 PROCEDURE — 87340 HEPATITIS B SURFACE AG IA: CPT

## 2021-11-05 PROCEDURE — 85025 COMPLETE CBC W/AUTO DIFF WBC: CPT

## 2021-11-05 PROCEDURE — 86900 BLOOD TYPING SEROLOGIC ABO: CPT

## 2021-11-05 PROCEDURE — 36415 COLL VENOUS BLD VENIPUNCTURE: CPT

## 2021-11-05 PROCEDURE — 86850 RBC ANTIBODY SCREEN: CPT

## 2021-11-05 PROCEDURE — 87086 URINE CULTURE/COLONY COUNT: CPT

## 2021-11-05 PROCEDURE — 86762 RUBELLA ANTIBODY: CPT

## 2021-11-05 PROCEDURE — 86901 BLOOD TYPING SEROLOGIC RH(D): CPT

## 2021-11-09 ENCOUNTER — TELEPHONE (OUTPATIENT)
Dept: OBGYN CLINIC | Facility: CLINIC | Age: 31
End: 2021-11-09

## 2021-11-09 NOTE — TELEPHONE ENCOUNTER
Spoke with pt and advised of normal NOB labs and negative HIV per Dixie Joel. Pt agreed and voiced understanding.

## 2021-11-09 NOTE — TELEPHONE ENCOUNTER
----- Message from Chantell Osorio CNM sent at 11/8/2021  7:11 PM CST -----  Please call patient and let her know NOB labs normal. HIV negative. Thanks!

## 2021-11-12 ENCOUNTER — APPOINTMENT (OUTPATIENT)
Dept: ULTRASOUND IMAGING | Age: 31
End: 2021-11-12
Attending: NURSE PRACTITIONER
Payer: COMMERCIAL

## 2021-11-12 ENCOUNTER — TELEPHONE (OUTPATIENT)
Dept: OBGYN CLINIC | Facility: CLINIC | Age: 31
End: 2021-11-12

## 2021-11-12 ENCOUNTER — HOSPITAL ENCOUNTER (OUTPATIENT)
Age: 31
Discharge: HOME | End: 2021-11-12
Payer: COMMERCIAL

## 2021-11-12 VITALS
DIASTOLIC BLOOD PRESSURE: 85 MMHG | OXYGEN SATURATION: 100 % | SYSTOLIC BLOOD PRESSURE: 137 MMHG | RESPIRATION RATE: 18 BRPM | HEART RATE: 97 BPM | TEMPERATURE: 99 F

## 2021-11-12 DIAGNOSIS — O21.0 HYPEREMESIS GRAVIDARUM: Primary | ICD-10-CM

## 2021-11-12 DIAGNOSIS — O21.9 NAUSEA AND VOMITING IN PREGNANCY: ICD-10-CM

## 2021-11-12 PROCEDURE — 96374 THER/PROPH/DIAG INJ IV PUSH: CPT | Performed by: NURSE PRACTITIONER

## 2021-11-12 PROCEDURE — 81002 URINALYSIS NONAUTO W/O SCOPE: CPT | Performed by: NURSE PRACTITIONER

## 2021-11-12 PROCEDURE — 99214 OFFICE O/P EST MOD 30 MIN: CPT | Performed by: NURSE PRACTITIONER

## 2021-11-12 PROCEDURE — 80047 BASIC METABLC PNL IONIZED CA: CPT | Performed by: NURSE PRACTITIONER

## 2021-11-12 PROCEDURE — 96361 HYDRATE IV INFUSION ADD-ON: CPT | Performed by: NURSE PRACTITIONER

## 2021-11-12 PROCEDURE — 76801 OB US < 14 WKS SINGLE FETUS: CPT | Performed by: NURSE PRACTITIONER

## 2021-11-12 PROCEDURE — 85025 COMPLETE CBC W/AUTO DIFF WBC: CPT | Performed by: NURSE PRACTITIONER

## 2021-11-12 RX ORDER — SODIUM CHLORIDE 9 MG/ML
1000 INJECTION, SOLUTION INTRAVENOUS ONCE
Status: COMPLETED | OUTPATIENT
Start: 2021-11-12 | End: 2021-11-12

## 2021-11-12 RX ORDER — METOCLOPRAMIDE 10 MG/1
5 TABLET ORAL 3 TIMES DAILY PRN
Qty: 15 TABLET | Refills: 0 | Status: SHIPPED | OUTPATIENT
Start: 2021-11-12 | End: 2021-11-27

## 2021-11-12 RX ORDER — DIPHENHYDRAMINE HYDROCHLORIDE 50 MG/ML
25 INJECTION INTRAMUSCULAR; INTRAVENOUS ONCE
Status: COMPLETED | OUTPATIENT
Start: 2021-11-12 | End: 2021-11-12

## 2021-11-12 NOTE — TELEPHONE ENCOUNTER
No signed LAURA on file. Called pt back, not . Pt states she has been unable to keep down any food or water down for 24 hours now. Instructed pt to go to ER. Pt states she would prefer to go to IC.  Advised pt I am not sure if they can do IV fluids at

## 2021-11-12 NOTE — ED INITIAL ASSESSMENT (HPI)
Pt came in due to NV for the past 24hrs. Pt stated she is unable to keep fluids down. Pt stated she is about 11 and 1/2 weeks pregnant at this moment. Pt has easy no labored respirations. Pt is fully verbal and ambulatory.

## 2021-11-12 NOTE — ED PROVIDER NOTES
Patient Seen in: [facility]      History   Patient presents with:  Nausea/Vomiting/Diarrhea: I am 11 weeks pregnant and unable to keep fluids down for ~24 hours.  My midwife's office suggested seeking immediate care for fluids. - Entered by tim HPI.  Constitutional and vital signs reviewed. All other systems reviewed and negative except as noted above.     Physical Exam     ED Triage Vitals [11/12/21 1240]   /85   Pulse 97   Resp 18   Temp 98.6 °F (37 °C)   Temp src Temporal   SpO2 100     FINDINGS:   GESTATIONAL SAC: Normal appearing single sac in the upper uterus.  No fluid around the sac.     FETAL POLE/EMBRYO: Present and normal appearing.  Crown-rump length = 5.21 cm.     NUCHAL REGION:   No mass or abnormal fluid collection.   an estimated gestational age of 5 weeks and 6 days, fetal heart rate of 170 bpm.  Patient does communicate feeling much improved post diphenhydramine IVP and 0.9 normal saline infusion. Patient did tolerate sips of water without vomiting.   I discussed wi

## 2021-11-14 ENCOUNTER — HOSPITAL ENCOUNTER (EMERGENCY)
Facility: HOSPITAL | Age: 31
Discharge: HOME | End: 2021-11-14
Attending: EMERGENCY MEDICINE
Payer: COMMERCIAL

## 2021-11-14 VITALS
OXYGEN SATURATION: 100 % | TEMPERATURE: 97 F | HEART RATE: 71 BPM | BODY MASS INDEX: 25.62 KG/M2 | WEIGHT: 173 LBS | RESPIRATION RATE: 20 BRPM | SYSTOLIC BLOOD PRESSURE: 118 MMHG | DIASTOLIC BLOOD PRESSURE: 76 MMHG | HEIGHT: 69 IN

## 2021-11-14 DIAGNOSIS — O21.0 HYPEREMESIS GRAVIDARUM: Primary | ICD-10-CM

## 2021-11-14 PROCEDURE — 96374 THER/PROPH/DIAG INJ IV PUSH: CPT

## 2021-11-14 PROCEDURE — 80048 BASIC METABOLIC PNL TOTAL CA: CPT | Performed by: EMERGENCY MEDICINE

## 2021-11-14 PROCEDURE — 99284 EMERGENCY DEPT VISIT MOD MDM: CPT

## 2021-11-14 PROCEDURE — 81003 URINALYSIS AUTO W/O SCOPE: CPT | Performed by: EMERGENCY MEDICINE

## 2021-11-14 PROCEDURE — 85025 COMPLETE CBC W/AUTO DIFF WBC: CPT | Performed by: EMERGENCY MEDICINE

## 2021-11-14 RX ORDER — ONDANSETRON 4 MG/1
4 TABLET, ORALLY DISINTEGRATING ORAL EVERY 8 HOURS PRN
Qty: 15 TABLET | Refills: 0 | Status: SHIPPED | OUTPATIENT
Start: 2021-11-14 | End: 2021-11-16

## 2021-11-14 RX ORDER — ONDANSETRON 2 MG/ML
4 INJECTION INTRAMUSCULAR; INTRAVENOUS ONCE
Status: COMPLETED | OUTPATIENT
Start: 2021-11-14 | End: 2021-11-14

## 2021-11-14 NOTE — ED PROVIDER NOTES
Patient Seen in: [facility] AND [facility] Emergency Department    History   Patient presents with:  Nausea    Stated Complaint: vomiting; 12wks preg    HPI    Patient presents to the emergency department currently here with her .   She is G2, P1 currently BEDTIME   PRENATAL VIT-FE BISG-FA-DHA OR,  Take by mouth. Sertraline HCl 50 MG Oral Tab,  Take 1 tablet (50 mg total) by mouth daily.          Review of Systems  Constitutional: no fever no abdominal pain      Positive for stated complaint: vomiting; 12wk recommended follow-up with her doctor she is currently with her  will drive her home    ED Course     Labs Reviewed   BASIC METABOLIC PANEL (8) - Abnormal; Notable for the following components:       Result Value    BUN 6 (*)     BUN/CREA Ratio 9.8

## 2021-11-14 NOTE — ED INITIAL ASSESSMENT (HPI)
Constant nausea since 10/1. 12 weeks pregnant. Denies any vomiting  Was seen at [address] [facility] on Friday and given IVF , nausea persists.

## 2021-11-15 NOTE — PROGRESS NOTES
Outpatient Maternal-Fetal Medicine Consultation    Dear Ms. Suleiman Weir    Thank you for requesting a first trimester ultrasound and MFM consultation on your patient Eve Bright.   As you are aware she is a 27year old female  with a craig indicated that her maternal grandfather is . She indicated that her paternal grandmother is . She indicated that her paternal grandfather is .       Medications:   Current Outpatient Medications:   •  buPROPion 150 MG Oral Tablet 24 issues:  FIRST TRIMESTER SCREENING:      The testing process was reviewed with the patient. This screening test utilizes maternal age, nuchal translucency, BRUNA-A, and free beta-hCG to calculate the risk for trisomies 21 and 25.  The results will be faxed (DR 98.6 percent, FPR 1.01 percent, false-negative rate [FNR] 1.4 percent), trisomy 18 (DR 94.9 percent, FPR 0.14 percent, FNR 5.1 percent), and trisomy 13 (DR 91.3 percent, FPR 0.14 percent, FNR 8.7 percent)   · DRs are lower and the failure rates are hig translocation. The majority of women who undergo cfDNA secondary screening will receive a low-risk result and thus will avoid an invasive procedure.  The high sensitivity of the test helps to ensure that very few women with an affected pregnancy will be in offered invasive diagnostic testing. · Women whose test does not yield a result can choose to undergo repeat testing, an invasive diagnostic procedure, or serum/ultrasound screening.     IMPRESSION:  · IUP at 12w0d  · First Trimester Screening: normal NT,

## 2021-11-16 ENCOUNTER — HOSPITAL ENCOUNTER (OUTPATIENT)
Dept: PERINATAL CARE | Facility: HOSPITAL | Age: 31
Discharge: HOME | End: 2021-11-16
Attending: ADVANCED PRACTICE MIDWIFE
Payer: COMMERCIAL

## 2021-11-16 ENCOUNTER — INITIAL PRENATAL (OUTPATIENT)
Dept: OBGYN CLINIC | Facility: CLINIC | Age: 31
End: 2021-11-16
Payer: COMMERCIAL

## 2021-11-16 ENCOUNTER — HOSPITAL ENCOUNTER (OUTPATIENT)
Dept: PERINATAL CARE | Facility: HOSPITAL | Age: 31
Discharge: HOME | End: 2021-11-16
Attending: OBSTETRICS & GYNECOLOGY
Payer: COMMERCIAL

## 2021-11-16 VITALS
SYSTOLIC BLOOD PRESSURE: 129 MMHG | HEART RATE: 97 BPM | WEIGHT: 172 LBS | DIASTOLIC BLOOD PRESSURE: 76 MMHG | BODY MASS INDEX: 25 KG/M2

## 2021-11-16 VITALS
BODY MASS INDEX: 26 KG/M2 | HEART RATE: 107 BPM | WEIGHT: 173 LBS | DIASTOLIC BLOOD PRESSURE: 84 MMHG | SYSTOLIC BLOOD PRESSURE: 135 MMHG

## 2021-11-16 DIAGNOSIS — Z36.9 FIRST TRIMESTER SCREENING: Primary | ICD-10-CM

## 2021-11-16 DIAGNOSIS — Z36.9 FIRST TRIMESTER SCREENING: ICD-10-CM

## 2021-11-16 DIAGNOSIS — Z34.90 PREGNANCY, UNSPECIFIED GESTATIONAL AGE: Primary | ICD-10-CM

## 2021-11-16 PROBLEM — D25.1 INTRAMURAL LEIOMYOMA OF UTERUS: Status: ACTIVE | Noted: 2021-11-16

## 2021-11-16 PROBLEM — F32.9 MAJOR DEPRESSIVE DISORDER: Status: ACTIVE | Noted: 2021-11-16

## 2021-11-16 PROBLEM — O21.9 NAUSEA AND VOMITING DURING PREGNANCY (HCC): Status: ACTIVE | Noted: 2021-11-16

## 2021-11-16 PROBLEM — O21.9 NAUSEA AND VOMITING DURING PREGNANCY: Status: ACTIVE | Noted: 2021-11-16

## 2021-11-16 PROCEDURE — 3078F DIAST BP <80 MM HG: CPT | Performed by: ADVANCED PRACTICE MIDWIFE

## 2021-11-16 PROCEDURE — 76813 OB US NUCHAL MEAS 1 GEST: CPT | Performed by: OBSTETRICS & GYNECOLOGY

## 2021-11-16 PROCEDURE — 81002 URINALYSIS NONAUTO W/O SCOPE: CPT | Performed by: ADVANCED PRACTICE MIDWIFE

## 2021-11-16 PROCEDURE — 3074F SYST BP LT 130 MM HG: CPT | Performed by: ADVANCED PRACTICE MIDWIFE

## 2021-11-16 PROCEDURE — 99242 OFF/OP CONSLTJ NEW/EST SF 20: CPT | Performed by: OBSTETRICS & GYNECOLOGY

## 2021-11-16 RX ORDER — ONDANSETRON 4 MG/1
4 TABLET, ORALLY DISINTEGRATING ORAL EVERY 8 HOURS PRN
Qty: 30 TABLET | Refills: 1 | Status: SHIPPED | OUTPATIENT
Start: 2021-11-16 | End: 2021-12-22

## 2021-11-16 NOTE — PROGRESS NOTES
Went to ER on Sunday d/t N & V. Given Zofran which has helped tremendously. Had tried [address]. Had covid vaccine & booster. Had flu vaccine. 1stt tri ultrasound today, normal. Small fibroid. Depression on Zoloft & wellbutrin.  Had d/c'd wellbutrin

## 2021-11-22 ENCOUNTER — TELEPHONE (OUTPATIENT)
Dept: PERINATAL CARE | Facility: HOSPITAL | Age: 31
End: 2021-11-22

## 2021-11-22 NOTE — TELEPHONE ENCOUNTER
Panorama screening results obt  Reviewed by DR de la vega    Results:  low risk  Low Risk for Aneuploidies, triploidy and Monosomy X  Fetal Sex:Male  Fetal Fraction: 6.9      Pt states understanding  Copy of results sent for scanning into pt record

## 2021-11-24 NOTE — TELEPHONE ENCOUNTER
Pt is 10 weeks. Offered missed menses appt & nurse ed visit & appts scheduled. advised pt that if she desires to not continue care she should cancel nurse ed visit.  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 and asthma who presents to the ED for expedited liver biopsy in the setting of AMS, was scheduled to have an OP biopsy on 11/8 but due to AMS presented on 11/3     *AMS   -unclear etiology  -s/p CTH, MRI, LP, EEG with definite etiology  -as patient does not have cirrhosis cannot have hepatic encephalopathy / ammonia normal   -after discussion between neurology and rheumatology, patient was started on high dose solumedrol to treat 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 failure one year ago with hospitalization at [facility], diagnosed with autoimmune hepatitis (bilirubin 9, AST/ALtT 1250/500, INR 1.3, liver biopsy: autoimmune hepatitis , probable IgG4+ cells), transferred to [facility] with stay until 1/6/21 with severe illness requiring ICU   -Repeat biopsy 5/2021: bridging fibrosis, PSC vs. DILI (no comment on IgG4)  -Recent hospitalization [facility] few weeks ago before admission with fatigue and dehydration, treated with IVF and steroids, seen by Dr. Carmichael on 10/27 off of steroids, found bilirubin 1.6, , AST/ALT 1078/574, IgG 13537. A liver biopsy was ordered for 11/08, but pt. came to ED b/o confusion  -Previous autoimmune workup: ARLENE 1/80, neg AMA, ASMA, anti LKM  -recent fibroscan with F3 fibrosis, no cirrhosis  -LFTs worsening despite steroids, currently improving r/o DILI. less likely uncontrolled IgG 4 disease  -bactrim stopped   -US noted     Recommendations  -Follow up hepatitis B PCR  -recommend finding alternative to bactrim as elevated LFts could be related to DILI (drug induced liver injury)   -Neurology and Rheumatology follow up for AMS, repeat LP, MRI  -avoid hepatotoxic medications  -Rest of care by medical team     Farrah Dalal, PGY5  Gastroenterology/Hepatology Fellow  Available on [employer] Teams    NON-URGENT CONSULTS:  Please email [email or web address]  AT NIGHT AND ON WEEKENDS:  Contact on-call GI fellow via answering service ([phone]) from 5pm-8am and on weekends/holidays

## 2021-11-28 ENCOUNTER — HOSPITAL ENCOUNTER (EMERGENCY)
Facility: HOSPITAL | Age: 31
Discharge: HOME | End: 2021-11-28
Payer: COMMERCIAL

## 2021-11-28 VITALS
TEMPERATURE: 97 F | WEIGHT: 166 LBS | HEART RATE: 72 BPM | DIASTOLIC BLOOD PRESSURE: 81 MMHG | BODY MASS INDEX: 24.59 KG/M2 | HEIGHT: 69 IN | RESPIRATION RATE: 14 BRPM | SYSTOLIC BLOOD PRESSURE: 124 MMHG | OXYGEN SATURATION: 100 %

## 2021-11-28 DIAGNOSIS — O21.0 HYPEREMESIS GRAVIDARUM: Primary | ICD-10-CM

## 2021-11-28 PROCEDURE — 96361 HYDRATE IV INFUSION ADD-ON: CPT

## 2021-11-28 PROCEDURE — 96374 THER/PROPH/DIAG INJ IV PUSH: CPT

## 2021-11-28 PROCEDURE — 81003 URINALYSIS AUTO W/O SCOPE: CPT | Performed by: NURSE PRACTITIONER

## 2021-11-28 PROCEDURE — 80048 BASIC METABOLIC PNL TOTAL CA: CPT | Performed by: NURSE PRACTITIONER

## 2021-11-28 PROCEDURE — 85025 COMPLETE CBC W/AUTO DIFF WBC: CPT | Performed by: NURSE PRACTITIONER

## 2021-11-28 PROCEDURE — 99284 EMERGENCY DEPT VISIT MOD MDM: CPT

## 2021-11-28 RX ORDER — METOCLOPRAMIDE 10 MG/1
10 TABLET ORAL 3 TIMES DAILY PRN
Qty: 20 TABLET | Refills: 0 | Status: SHIPPED | OUTPATIENT
Start: 2021-11-28 | End: 2021-12-22

## 2021-11-28 RX ORDER — METOCLOPRAMIDE HYDROCHLORIDE 5 MG/ML
10 INJECTION INTRAMUSCULAR; INTRAVENOUS ONCE
Status: COMPLETED | OUTPATIENT
Start: 2021-11-28 | End: 2021-11-28

## 2021-11-29 NOTE — ED PROVIDER NOTES
Patient Seen in: [facility] AND [facility] Emergency Department      History   Patient presents with:  Pregnancy Issues    Stated Complaint: Pregnacy Issue    Subjective:   30yo/f with hx of anxiety, depression, HLD reports to the ED with complaints of nausea, v 24.51 kg/m²         Physical Exam  Vitals and nursing note reviewed. Constitutional:       General: She is not in acute distress. Appearance: She is well-developed. HENT:      Head: Normocephalic and atraumatic.    Eyes:      Conjunctiva/sclera: Con result                 Please view results for these tests on the individual orders.    CBC W/ DIFFERENTIAL                 MDM        30yo/f w hx and exam as stated complaints of vomiting, pregnancy    Non toxic, well appearing  No vomiting  No ches

## 2021-11-29 NOTE — ED INITIAL ASSESSMENT (HPI)
Patient is 14 weeks pregnant and unable to keep anything down for 24 hours. Patient c/o vomiting and dizziness.

## 2021-11-29 NOTE — ED QUICK NOTES
Patient safe to DC home per NP. Able to dress self. DC teaching done, instructions reviewed with patient including when and how to follow up with healthcare providers and when to seek emergency care. The patient verbalizes understanding.  Peripheral IV disc

## 2021-12-13 ENCOUNTER — ROUTINE PRENATAL (OUTPATIENT)
Dept: OBGYN CLINIC | Facility: CLINIC | Age: 31
End: 2021-12-13
Payer: COMMERCIAL

## 2021-12-13 VITALS
WEIGHT: 167 LBS | SYSTOLIC BLOOD PRESSURE: 115 MMHG | BODY MASS INDEX: 25 KG/M2 | HEART RATE: 90 BPM | DIASTOLIC BLOOD PRESSURE: 77 MMHG

## 2021-12-13 DIAGNOSIS — O09.891 MEDICATION EXPOSURE DURING FIRST TRIMESTER OF PREGNANCY: ICD-10-CM

## 2021-12-13 DIAGNOSIS — Z34.90 PREGNANCY, UNSPECIFIED GESTATIONAL AGE: Primary | ICD-10-CM

## 2021-12-13 PROCEDURE — 3078F DIAST BP <80 MM HG: CPT | Performed by: ADVANCED PRACTICE MIDWIFE

## 2021-12-13 PROCEDURE — 81002 URINALYSIS NONAUTO W/O SCOPE: CPT | Performed by: ADVANCED PRACTICE MIDWIFE

## 2021-12-13 PROCEDURE — 3074F SYST BP LT 130 MM HG: CPT | Performed by: ADVANCED PRACTICE MIDWIFE

## 2021-12-14 NOTE — PROGRESS NOTES
Corinne presents for ERICKA with her partner. Was seen in ED on 12/4 for nausea and vomiting. Was sent home with Reglan but had reaction to Reglan so has not been taking it. States that nausea and vomiting has improved.  She is only having symptoms in the [facility]

## 2021-12-21 ENCOUNTER — OFFICE VISIT (OUTPATIENT)
Dept: FAMILY MEDICINE CLINIC | Facility: CLINIC | Age: 31
End: 2021-12-21
Payer: COMMERCIAL

## 2021-12-21 ENCOUNTER — PATIENT MESSAGE (OUTPATIENT)
Dept: FAMILY MEDICINE CLINIC | Facility: CLINIC | Age: 31
End: 2021-12-21

## 2021-12-21 VITALS
HEART RATE: 93 BPM | WEIGHT: 166.81 LBS | HEIGHT: 69 IN | BODY MASS INDEX: 24.71 KG/M2 | DIASTOLIC BLOOD PRESSURE: 78 MMHG | SYSTOLIC BLOOD PRESSURE: 128 MMHG

## 2021-12-21 DIAGNOSIS — K21.9 GASTROESOPHAGEAL REFLUX DISEASE WITHOUT ESOPHAGITIS: Primary | ICD-10-CM

## 2021-12-21 PROCEDURE — 3074F SYST BP LT 130 MM HG: CPT | Performed by: FAMILY MEDICINE

## 2021-12-21 PROCEDURE — 3078F DIAST BP <80 MM HG: CPT | Performed by: FAMILY MEDICINE

## 2021-12-21 PROCEDURE — 3008F BODY MASS INDEX DOCD: CPT | Performed by: FAMILY MEDICINE

## 2021-12-21 PROCEDURE — 99214 OFFICE O/P EST MOD 30 MIN: CPT | Performed by: FAMILY MEDICINE

## 2021-12-21 RX ORDER — LANSOPRAZOLE 30 MG/1
30 CAPSULE, DELAYED RELEASE ORAL DAILY
Qty: 90 CAPSULE | Refills: 3 | Status: SHIPPED | OUTPATIENT
Start: 2021-12-21 | End: 2022-01-14

## 2021-12-22 NOTE — TELEPHONE ENCOUNTER
From: Zachary Mesa  To: Amaya Caldwell MD  Sent: 12/21/2021 5:01 PM CST  Subject: Appointment? Hi, I just got a message that an appointment was scheduled for me with Dr. Memo Caldwell on January 17th but I did not request this appointment.

## 2021-12-22 NOTE — PROGRESS NOTES
Rico Pel is a 27year old female.   Patient presents with:  Reflux: reflux occurring on and off for a couple years, worsened about 2 months ago, pt is pregnant      HPI:   Patient is a 79-year-old female who is approximately 17 weeks pregnan Smoking status: Never Smoker      Smokeless tobacco: Never Used    Vaping Use      Vaping Use: Never used    Alcohol use: Not Currently      Comment: weekly    Drug use: Never       REVIEW OF SYSTEMS:   GENERAL HEALTH: No fevers, chills, sweats, fatigue  V

## 2022-01-12 ENCOUNTER — ROUTINE PRENATAL (OUTPATIENT)
Dept: OBGYN CLINIC | Facility: CLINIC | Age: 32
End: 2022-01-12
Payer: COMMERCIAL

## 2022-01-12 ENCOUNTER — HOSPITAL ENCOUNTER (OUTPATIENT)
Dept: PERINATAL CARE | Facility: HOSPITAL | Age: 32
Discharge: HOME | End: 2022-01-12
Attending: ADVANCED PRACTICE MIDWIFE
Payer: COMMERCIAL

## 2022-01-12 ENCOUNTER — LAB ENCOUNTER (OUTPATIENT)
Dept: LAB | Facility: HOSPITAL | Age: 32
End: 2022-01-12
Attending: ADVANCED PRACTICE MIDWIFE
Payer: COMMERCIAL

## 2022-01-12 ENCOUNTER — HOSPITAL ENCOUNTER (OUTPATIENT)
Dept: PERINATAL CARE | Facility: HOSPITAL | Age: 32
Discharge: HOME | End: 2022-01-12
Attending: OBSTETRICS & GYNECOLOGY
Payer: COMMERCIAL

## 2022-01-12 VITALS
SYSTOLIC BLOOD PRESSURE: 124 MMHG | HEART RATE: 82 BPM | WEIGHT: 171.19 LBS | DIASTOLIC BLOOD PRESSURE: 84 MMHG | BODY MASS INDEX: 25 KG/M2

## 2022-01-12 VITALS
HEART RATE: 102 BPM | BODY MASS INDEX: 25 KG/M2 | SYSTOLIC BLOOD PRESSURE: 126 MMHG | WEIGHT: 170 LBS | DIASTOLIC BLOOD PRESSURE: 77 MMHG

## 2022-01-12 DIAGNOSIS — Z36.3 SCREENING, ANTENATAL, FOR MALFORMATION BY ULTRASOUND: ICD-10-CM

## 2022-01-12 DIAGNOSIS — O09.891 MEDICATION EXPOSURE DURING FIRST TRIMESTER OF PREGNANCY: ICD-10-CM

## 2022-01-12 DIAGNOSIS — Z34.82 ENCOUNTER FOR SUPERVISION OF OTHER NORMAL PREGNANCY IN SECOND TRIMESTER: Primary | ICD-10-CM

## 2022-01-12 DIAGNOSIS — D25.1 INTRAMURAL LEIOMYOMA OF UTERUS: ICD-10-CM

## 2022-01-12 DIAGNOSIS — O09.891 MEDICATION EXPOSURE DURING FIRST TRIMESTER OF PREGNANCY: Primary | ICD-10-CM

## 2022-01-12 DIAGNOSIS — Z34.82 ENCOUNTER FOR SUPERVISION OF OTHER NORMAL PREGNANCY IN SECOND TRIMESTER: ICD-10-CM

## 2022-01-12 LAB
APPEARANCE: CLEAR
BILIRUBIN: NEGATIVE
GLUCOSE (URINE DIPSTICK): NEGATIVE MG/DL
KETONES (URINE DIPSTICK): NEGATIVE MG/DL
LEUKOCYTES: NEGATIVE
MULTISTIX LOT#: NORMAL NUMERIC
NITRITE, URINE: NEGATIVE
OCCULT BLOOD: NEGATIVE
PH, URINE: 7.5 (ref 4.5–8)
PROTEIN (URINE DIPSTICK): NEGATIVE MG/DL
SPECIFIC GRAVITY: 1.01 (ref 1–1.03)
URINE-COLOR: YELLOW
UROBILINOGEN,SEMI-QN: 0.2 MG/DL (ref 0–1.9)

## 2022-01-12 PROCEDURE — 82105 ALPHA-FETOPROTEIN SERUM: CPT

## 2022-01-12 PROCEDURE — 3079F DIAST BP 80-89 MM HG: CPT | Performed by: ADVANCED PRACTICE MIDWIFE

## 2022-01-12 PROCEDURE — 3074F SYST BP LT 130 MM HG: CPT | Performed by: ADVANCED PRACTICE MIDWIFE

## 2022-01-12 PROCEDURE — 76811 OB US DETAILED SNGL FETUS: CPT | Performed by: OBSTETRICS & GYNECOLOGY

## 2022-01-12 PROCEDURE — 36415 COLL VENOUS BLD VENIPUNCTURE: CPT

## 2022-01-12 PROCEDURE — 99214 OFFICE O/P EST MOD 30 MIN: CPT | Performed by: OBSTETRICS & GYNECOLOGY

## 2022-01-12 PROCEDURE — 81003 URINALYSIS AUTO W/O SCOPE: CPT | Performed by: ADVANCED PRACTICE MIDWIFE

## 2022-01-12 NOTE — PROGRESS NOTES
Mina Valdez is here for her ERICKA visit. Currently, she is feeling well. Denies 2nd trimester danger signs. Still has food eversions and emesis about 1x/week but keeping most foods down. Desires AFP today.     Vital signs and weight reviewed  See flowsheets   A

## 2022-01-12 NOTE — PROGRESS NOTES
Outpatient Maternal-Fetal Medicine Consultation    Dear Ms. Mary Lou Chan    Thank you for requesting an ultrasound and MFM consultation on your patient Jahaira Jefferson.   As you are aware she is a 27year old female  with a singletonpregnancy and a grandfather is . She indicated that her paternal grandmother is . She indicated that her paternal grandfather is .       Medications:   Current Outpatient Medications:   •  sertraline 50 MG Oral Tab, Take 1 tablet (50 mg total) by mo development and behavior are not known. Due to pregnancy-induced physiologic changes, women who are pregnant may require increased doses of paroxetine to achieve euthymia.  Women treated for major depression and who are euthymic prior to pregnancy normal, lip normal, palate normal.  Heart: visualized and normal appearance: 3 vessel view, four-chamber, left outflow tract, right outflow tract, arches. Genetic Sonogram:  Nuchal fold normal.  Pylelectasis absent. No hyperechogenic bowel.   Mele Ewing

## 2022-01-14 LAB
AFP SMOKING: NO
FAMILY HX NEURAL TUBE DEFECT: NO
INSULIN REQ MATERNAL DIABETES: NO
MATERNAL AGE OF DELIVERY: 32.7 YR
MOM FOR AFP: 1.01
PATIENT'S AFP: 56 NG/ML

## 2022-01-14 RX ORDER — LANSOPRAZOLE 30 MG/1
30 CAPSULE, DELAYED RELEASE ORAL DAILY
Qty: 90 CAPSULE | Refills: 3 | Status: CANCELLED | OUTPATIENT
Start: 2022-01-14 | End: 2023-01-09

## 2022-01-14 RX ORDER — LANSOPRAZOLE 30 MG/1
30 CAPSULE, DELAYED RELEASE ORAL DAILY
Qty: 90 CAPSULE | Refills: 3 | Status: SHIPPED | OUTPATIENT
Start: 2022-01-14 | End: 2023-01-09

## 2022-01-14 NOTE — TELEPHONE ENCOUNTER
Requested Prescriptions   Pending Prescriptions Disp Refills    lansoprazole (PREVACID) 30 MG Oral Capsule Delayed Release 90 capsule 3     Sig: Take 1 capsule (30 mg total) by mouth daily.         Gastrointestional Medication Protocol Passed - 1/14/20

## 2022-02-04 ENCOUNTER — ROUTINE PRENATAL (OUTPATIENT)
Dept: OBGYN CLINIC | Facility: CLINIC | Age: 32
End: 2022-02-04
Payer: COMMERCIAL

## 2022-02-04 VITALS
SYSTOLIC BLOOD PRESSURE: 122 MMHG | DIASTOLIC BLOOD PRESSURE: 71 MMHG | WEIGHT: 174.19 LBS | HEART RATE: 87 BPM | BODY MASS INDEX: 26 KG/M2

## 2022-02-04 DIAGNOSIS — Z34.82 ENCOUNTER FOR SUPERVISION OF OTHER NORMAL PREGNANCY IN SECOND TRIMESTER: Primary | ICD-10-CM

## 2022-02-04 LAB
BILIRUBIN: NEGATIVE
GLUCOSE (URINE DIPSTICK): NEGATIVE MG/DL
KETONES (URINE DIPSTICK): NEGATIVE MG/DL
MULTISTIX LOT#: ABNORMAL NUMERIC
NITRITE, URINE: NEGATIVE
OCCULT BLOOD: NEGATIVE
PH, URINE: 6.5 (ref 4.5–8)
PROTEIN (URINE DIPSTICK): NEGATIVE MG/DL
SPECIFIC GRAVITY: 1.01 (ref 1–1.03)
URINE-COLOR: YELLOW
UROBILINOGEN,SEMI-QN: 0.2 MG/DL (ref 0–1.9)

## 2022-02-04 PROCEDURE — 3074F SYST BP LT 130 MM HG: CPT | Performed by: ADVANCED PRACTICE MIDWIFE

## 2022-02-04 PROCEDURE — 81002 URINALYSIS NONAUTO W/O SCOPE: CPT | Performed by: ADVANCED PRACTICE MIDWIFE

## 2022-02-04 PROCEDURE — 3078F DIAST BP <80 MM HG: CPT | Performed by: ADVANCED PRACTICE MIDWIFE

## 2022-02-04 NOTE — PROGRESS NOTES
Julio César Mackey, is at 23w3d, here for her Johanna Dumont 9038 visit. Currently, she is feeling well. Denies 2nd trimester danger signs. Vital signs and weight reviewed  See flowsheets   AFP negative and anatomy scan WNL. Both reviewed with patient    Today's Assessment/Plan: Care is up to date. 3T labs ordered for next visit  Next visit: 4 weeks. 3T labs and Tdap then    Reviewed:   Prenatal visit schedule  Recommendations and rationale for Tdap in pregnancy   labor precautions  Kick counts  Danger signs  Labor precautions  Current L&D policies:   Doulas aloud again if certified    Pt verbalized understanding. All questions answered.  No barriers to learning identified

## 2022-02-17 ENCOUNTER — TELEPHONE (OUTPATIENT)
Dept: OBGYN CLINIC | Facility: CLINIC | Age: 32
End: 2022-02-17

## 2022-02-17 NOTE — TELEPHONE ENCOUNTER
Reports sharp pain under rib mid abd & more to the right side x1 week. Had vomiting on Monday. Denies HA, visual changes, unusual swelling. +fm. Has hx of gerd & takes prilosec. Does not feel like indigestion. Offered appt. Pt prefers to come tomorrow due to weather. appt schedule. Instructed pt to call if symptoms worsen tonight.  Pt verbalized an understanding & agrees w/ plan

## 2022-02-18 ENCOUNTER — LAB ENCOUNTER (OUTPATIENT)
Dept: LAB | Facility: HOSPITAL | Age: 32
End: 2022-02-18
Attending: ADVANCED PRACTICE MIDWIFE
Payer: COMMERCIAL

## 2022-02-18 ENCOUNTER — ROUTINE PRENATAL (OUTPATIENT)
Dept: OBGYN CLINIC | Facility: CLINIC | Age: 32
End: 2022-02-18
Payer: COMMERCIAL

## 2022-02-18 VITALS
DIASTOLIC BLOOD PRESSURE: 80 MMHG | HEART RATE: 103 BPM | SYSTOLIC BLOOD PRESSURE: 121 MMHG | BODY MASS INDEX: 26 KG/M2 | WEIGHT: 175 LBS

## 2022-02-18 DIAGNOSIS — Z34.82 PRENATAL CARE, SUBSEQUENT PREGNANCY IN SECOND TRIMESTER: Primary | ICD-10-CM

## 2022-02-18 DIAGNOSIS — Z34.82 PRENATAL CARE, SUBSEQUENT PREGNANCY IN SECOND TRIMESTER: ICD-10-CM

## 2022-02-18 LAB
GLUCOSE (URINE DIPSTICK): NEGATIVE MG/DL
KETONES (URINE DIPSTICK): NEGATIVE MG/DL
MULTISTIX LOT#: ABNORMAL NUMERIC
NITRITE, URINE: NEGATIVE
OCCULT BLOOD: NEGATIVE
PH, URINE: 6 (ref 4.5–8)
SARS-COV-2 RNA RESP QL NAA+PROBE: NOT DETECTED
SPECIFIC GRAVITY: 1.02 (ref 1–1.03)
UROBILINOGEN,SEMI-QN: 0.2 MG/DL (ref 0–1.9)

## 2022-02-18 PROCEDURE — 3079F DIAST BP 80-89 MM HG: CPT | Performed by: ADVANCED PRACTICE MIDWIFE

## 2022-02-18 PROCEDURE — 3074F SYST BP LT 130 MM HG: CPT | Performed by: ADVANCED PRACTICE MIDWIFE

## 2022-02-18 PROCEDURE — 81002 URINALYSIS NONAUTO W/O SCOPE: CPT | Performed by: ADVANCED PRACTICE MIDWIFE

## 2022-02-18 RX ORDER — ASPIRIN 81 MG/1
TABLET, CHEWABLE ORAL
Qty: 90 TABLET | Refills: 2 | Status: SHIPPED | OUTPATIENT
Start: 2022-02-18

## 2022-02-18 NOTE — PROGRESS NOTES
Watson Duron, is at 25w3d, here for an add-on ERICKA visit because she has been experiencing sharp RUQ pains for about 5 days. Had some emesis as well but none since Monday. States pains seem to be more present after she eats. No fever, chills, diarrhea, or sick contacts. Not sure if she ate anything that could have caused the symptoms. On Prevacid for heartburn/reflux in pregnancy. States these symptoms feel different than her reflux. Denies headaches or vision changes. Does report that her mother had pre-eclampsia with her first pregnancy. Has not been taking a baby ASA in this pregnancy. Vital signs and weight reviewed  See flowsheets   No reproducible abdominal pain upon palpation. No guarding or rebound    Today's Assessment/Plan: RUQ pain likely gastritis. To call if symptoms persist or worsen. Will obtain BP cuff and monitor daily for the week and report any elevated BP's. Baby ASA initiated although late in pregnancy. COVID test ordered. Next visit: 2 weeks as scheduled. If symptoms not resolved, will consider GI consult    Reviewed:   Prenatal visit schedule   labor precautions  Kick counts  Danger signs    Pt verbalized understanding. All questions answered.  No barriers to learning identified

## 2022-03-03 ENCOUNTER — ROUTINE PRENATAL (OUTPATIENT)
Dept: OBGYN CLINIC | Facility: CLINIC | Age: 32
End: 2022-03-03
Payer: COMMERCIAL

## 2022-03-03 ENCOUNTER — LAB ENCOUNTER (OUTPATIENT)
Dept: LAB | Facility: REFERENCE LAB | Age: 32
End: 2022-03-03
Attending: ADVANCED PRACTICE MIDWIFE
Payer: COMMERCIAL

## 2022-03-03 VITALS
HEART RATE: 88 BPM | DIASTOLIC BLOOD PRESSURE: 78 MMHG | SYSTOLIC BLOOD PRESSURE: 115 MMHG | WEIGHT: 176.63 LBS | BODY MASS INDEX: 26 KG/M2

## 2022-03-03 DIAGNOSIS — Z34.82 ENCOUNTER FOR SUPERVISION OF OTHER NORMAL PREGNANCY IN SECOND TRIMESTER: ICD-10-CM

## 2022-03-03 DIAGNOSIS — Z34.82 ENCOUNTER FOR SUPERVISION OF OTHER NORMAL PREGNANCY IN SECOND TRIMESTER: Primary | ICD-10-CM

## 2022-03-03 LAB
BILIRUBIN: NEGATIVE
DEPRECATED RDW RBC AUTO: 38.9 FL (ref 35.1–46.3)
ERYTHROCYTE [DISTWIDTH] IN BLOOD BY AUTOMATED COUNT: 12.5 % (ref 11–15)
GLUCOSE 1H P GLC SERPL-MCNC: 100 MG/DL
HCT VFR BLD AUTO: 32.9 %
HGB BLD-MCNC: 10.8 G/DL
KETONES (URINE DIPSTICK): NEGATIVE MG/DL
MCH RBC QN AUTO: 28.2 PG (ref 26–34)
MCHC RBC AUTO-ENTMCNC: 32.8 G/DL (ref 31–37)
MCV RBC AUTO: 85.9 FL
MULTISTIX LOT#: ABNORMAL NUMERIC
NITRITE, URINE: NEGATIVE
OCCULT BLOOD: NEGATIVE
PH, URINE: 6.5 (ref 4.5–8)
PLATELET # BLD AUTO: 257 10(3)UL (ref 150–450)
PROTEIN (URINE DIPSTICK): NEGATIVE MG/DL
RBC # BLD AUTO: 3.83 X10(6)UL
SPECIFIC GRAVITY: 1.01 (ref 1–1.03)
URINE-COLOR: YELLOW
UROBILINOGEN,SEMI-QN: 0.2 MG/DL (ref 0–1.9)
WBC # BLD AUTO: 10.4 X10(3) UL (ref 4–11)

## 2022-03-03 PROCEDURE — 36415 COLL VENOUS BLD VENIPUNCTURE: CPT

## 2022-03-03 PROCEDURE — 87389 HIV-1 AG W/HIV-1&-2 AB AG IA: CPT

## 2022-03-03 PROCEDURE — 82950 GLUCOSE TEST: CPT

## 2022-03-03 PROCEDURE — 3074F SYST BP LT 130 MM HG: CPT | Performed by: ADVANCED PRACTICE MIDWIFE

## 2022-03-03 PROCEDURE — 3078F DIAST BP <80 MM HG: CPT | Performed by: ADVANCED PRACTICE MIDWIFE

## 2022-03-03 PROCEDURE — 85027 COMPLETE CBC AUTOMATED: CPT

## 2022-03-03 PROCEDURE — 81002 URINALYSIS NONAUTO W/O SCOPE: CPT | Performed by: ADVANCED PRACTICE MIDWIFE

## 2022-03-03 PROCEDURE — 86780 TREPONEMA PALLIDUM: CPT

## 2022-03-04 ENCOUNTER — TELEPHONE (OUTPATIENT)
Dept: OBGYN CLINIC | Facility: CLINIC | Age: 32
End: 2022-03-04

## 2022-03-04 LAB — T PALLIDUM AB SER QL: NEGATIVE

## 2022-03-04 NOTE — TELEPHONE ENCOUNTER
----- Message from Myles Randall CNM sent at 3/4/2022  2:04 PM CST -----  Please notify patient by phone that her 3rd trimester labs were normal including a negative HIV result.

## 2022-03-04 NOTE — TELEPHONE ENCOUNTER
Spoke with pt advised of normal 3rd trimester labs and negative HIV. Pt agreed and voiced understanding.

## 2022-03-07 ENCOUNTER — ROUTINE PRENATAL (OUTPATIENT)
Dept: OBGYN CLINIC | Facility: CLINIC | Age: 32
End: 2022-03-07
Payer: COMMERCIAL

## 2022-03-07 ENCOUNTER — LAB ENCOUNTER (OUTPATIENT)
Dept: LAB | Facility: HOSPITAL | Age: 32
End: 2022-03-07
Attending: ADVANCED PRACTICE MIDWIFE
Payer: COMMERCIAL

## 2022-03-07 VITALS
SYSTOLIC BLOOD PRESSURE: 111 MMHG | BODY MASS INDEX: 26 KG/M2 | DIASTOLIC BLOOD PRESSURE: 75 MMHG | HEART RATE: 89 BPM | WEIGHT: 177 LBS

## 2022-03-07 DIAGNOSIS — Z34.82 ENCOUNTER FOR SUPERVISION OF OTHER NORMAL PREGNANCY IN SECOND TRIMESTER: ICD-10-CM

## 2022-03-07 DIAGNOSIS — R10.11 RIGHT UPPER QUADRANT PAIN: ICD-10-CM

## 2022-03-07 DIAGNOSIS — Z34.82 ENCOUNTER FOR SUPERVISION OF OTHER NORMAL PREGNANCY IN SECOND TRIMESTER: Primary | ICD-10-CM

## 2022-03-07 LAB
ALBUMIN SERPL-MCNC: 2.9 G/DL (ref 3.4–5)
ALBUMIN/GLOB SERPL: 0.8 {RATIO} (ref 1–2)
ALP LIVER SERPL-CCNC: 102 U/L
ALT SERPL-CCNC: 20 U/L
APPEARANCE: CLEAR
AST SERPL-CCNC: 15 U/L (ref 15–37)
BILIRUB SERPL-MCNC: 0.3 MG/DL (ref 0.1–2)
BILIRUBIN: NEGATIVE
BUN BLD-MCNC: 6 MG/DL (ref 7–18)
BUN/CREAT SERPL: 10.9 (ref 10–20)
CALCIUM BLD-MCNC: 8.6 MG/DL (ref 8.5–10.1)
CHLORIDE SERPL-SCNC: 107 MMOL/L (ref 98–112)
CO2 SERPL-SCNC: 23 MMOL/L (ref 21–32)
CREAT BLD-MCNC: 0.55 MG/DL
FASTING STATUS PATIENT QL REPORTED: NO
GLOBULIN PLAS-MCNC: 3.5 G/DL (ref 2.8–4.4)
GLUCOSE (URINE DIPSTICK): NEGATIVE MG/DL
GLUCOSE BLD-MCNC: 108 MG/DL (ref 70–99)
KETONES (URINE DIPSTICK): NEGATIVE MG/DL
LEUKOCYTES: NEGATIVE
MULTISTIX LOT#: NORMAL NUMERIC
NITRITE, URINE: NEGATIVE
OCCULT BLOOD: NEGATIVE
OSMOLALITY SERPL CALC.SUM OF ELEC: 282 MOSM/KG (ref 275–295)
PH, URINE: 5.5 (ref 4.5–8)
POTASSIUM SERPL-SCNC: 3.8 MMOL/L (ref 3.5–5.1)
PROT SERPL-MCNC: 6.4 G/DL (ref 6.4–8.2)
SODIUM SERPL-SCNC: 137 MMOL/L (ref 136–145)
SPECIFIC GRAVITY: 1.01 (ref 1–1.03)
URINE-COLOR: YELLOW
UROBILINOGEN,SEMI-QN: 0.2 MG/DL (ref 0–1.9)

## 2022-03-07 PROCEDURE — 36415 COLL VENOUS BLD VENIPUNCTURE: CPT

## 2022-03-07 PROCEDURE — 81002 URINALYSIS NONAUTO W/O SCOPE: CPT | Performed by: ADVANCED PRACTICE MIDWIFE

## 2022-03-07 PROCEDURE — 80053 COMPREHEN METABOLIC PANEL: CPT

## 2022-03-07 PROCEDURE — 3074F SYST BP LT 130 MM HG: CPT | Performed by: ADVANCED PRACTICE MIDWIFE

## 2022-03-07 PROCEDURE — 3078F DIAST BP <80 MM HG: CPT | Performed by: ADVANCED PRACTICE MIDWIFE

## 2022-03-07 NOTE — PROGRESS NOTES
Giovanny Cortez is a 28 yo  at [address]. She is here for RUQ for the past few weeks. Denies any headache, spots in vision or edema. She does have blurry vision at times but believes it to be related to her glasses. She notices the pain after eating meals, rates it a 7/10. Per pt has a family history of gallbladder issues. Active fetus, denies any vaginal bleeding, contractions or leakage of fluid. Negative CVA tenderness or rebound tenderness. Afebrile and normotensive. Gallbladder ultrasound ordered as well as CMP. Instructed pt to have lab completed and US scheduled today. Recommended a low fat and low salt diet. Plan to f/u according to results. All questions and concerns answered. DOMINIQUE Woodward under the direct supervision of Arturo Rivera, [address] [place] [address].

## 2022-03-08 ENCOUNTER — HOSPITAL ENCOUNTER (OUTPATIENT)
Dept: ULTRASOUND IMAGING | Facility: HOSPITAL | Age: 32
Discharge: HOME | End: 2022-03-08
Attending: ADVANCED PRACTICE MIDWIFE
Payer: COMMERCIAL

## 2022-03-08 DIAGNOSIS — Z34.82 ENCOUNTER FOR SUPERVISION OF OTHER NORMAL PREGNANCY IN SECOND TRIMESTER: ICD-10-CM

## 2022-03-08 DIAGNOSIS — R10.11 RIGHT UPPER QUADRANT PAIN: ICD-10-CM

## 2022-03-08 PROCEDURE — 76705 ECHO EXAM OF ABDOMEN: CPT | Performed by: ADVANCED PRACTICE MIDWIFE

## 2022-03-10 ENCOUNTER — TELEPHONE (OUTPATIENT)
Dept: OBGYN CLINIC | Facility: CLINIC | Age: 32
End: 2022-03-10

## 2022-03-10 NOTE — TELEPHONE ENCOUNTER
Phone call to patient to review unremarkable gall bladder ultrasound. Patient has been following a low fat diet which seems to be helping symptoms. Encouraged her to continue following diet.  Follow-up with ERICKA on 3/22/22

## 2022-03-20 ENCOUNTER — HOSPITAL ENCOUNTER (EMERGENCY)
Facility: HOSPITAL | Age: 32
Discharge: HOME | End: 2022-03-20
Attending: EMERGENCY MEDICINE
Payer: COMMERCIAL

## 2022-03-20 ENCOUNTER — APPOINTMENT (OUTPATIENT)
Dept: ULTRASOUND IMAGING | Facility: HOSPITAL | Age: 32
End: 2022-03-20
Attending: EMERGENCY MEDICINE
Payer: COMMERCIAL

## 2022-03-20 ENCOUNTER — HOSPITAL ENCOUNTER (OUTPATIENT)
Facility: HOSPITAL | Age: 32
Discharge: HOME | End: 2022-03-20
Attending: ADVANCED PRACTICE MIDWIFE | Admitting: OBSTETRICS & GYNECOLOGY
Payer: COMMERCIAL

## 2022-03-20 VITALS
RESPIRATION RATE: 18 BRPM | DIASTOLIC BLOOD PRESSURE: 71 MMHG | SYSTOLIC BLOOD PRESSURE: 93 MMHG | HEART RATE: 70 BPM | BODY MASS INDEX: 25.18 KG/M2 | TEMPERATURE: 98 F | WEIGHT: 170 LBS | HEIGHT: 69 IN | OXYGEN SATURATION: 100 %

## 2022-03-20 VITALS
TEMPERATURE: 98 F | HEART RATE: 73 BPM | SYSTOLIC BLOOD PRESSURE: 132 MMHG | DIASTOLIC BLOOD PRESSURE: 80 MMHG | RESPIRATION RATE: 18 BRPM

## 2022-03-20 DIAGNOSIS — R10.11 POSTPRANDIAL ABDOMINAL PAIN IN RIGHT UPPER QUADRANT: Primary | ICD-10-CM

## 2022-03-20 DIAGNOSIS — K82.4 GALL BLADDER POLYP: ICD-10-CM

## 2022-03-20 PROBLEM — O26.10 LOW WEIGHT GAIN DURING PREGNANCY, ANTEPARTUM (HCC): Status: ACTIVE | Noted: 2022-03-20

## 2022-03-20 PROBLEM — Z34.90 PREGNANCY: Status: ACTIVE | Noted: 2022-03-20

## 2022-03-20 PROBLEM — Z34.90 PREGNANCY (HCC): Status: ACTIVE | Noted: 2022-03-20

## 2022-03-20 PROBLEM — Z3A.29 29 WEEKS GESTATION OF PREGNANCY: Status: ACTIVE | Noted: 2022-03-20

## 2022-03-20 PROBLEM — O26.10 LOW WEIGHT GAIN DURING PREGNANCY, ANTEPARTUM: Status: ACTIVE | Noted: 2022-03-20

## 2022-03-20 PROBLEM — Z3A.29 29 WEEKS GESTATION OF PREGNANCY (HCC): Status: ACTIVE | Noted: 2022-03-20

## 2022-03-20 LAB
ALBUMIN SERPL-MCNC: 3 G/DL (ref 3.4–5)
ALP LIVER SERPL-CCNC: 136 U/L
ALT SERPL-CCNC: 25 U/L
ANION GAP SERPL CALC-SCNC: 8 MMOL/L (ref 0–18)
AST SERPL-CCNC: 23 U/L (ref 15–37)
BASOPHILS # BLD AUTO: 0.04 X10(3) UL (ref 0–0.2)
BASOPHILS NFR BLD AUTO: 0.3 %
BILIRUB DIRECT SERPL-MCNC: 0.1 MG/DL (ref 0–0.2)
BILIRUB SERPL-MCNC: 0.4 MG/DL (ref 0.1–2)
BILIRUB UR QL: NEGATIVE
BUN BLD-MCNC: 9 MG/DL (ref 7–18)
BUN/CREAT SERPL: 13.4 (ref 10–20)
CALCIUM BLD-MCNC: 8.7 MG/DL (ref 8.5–10.1)
CHLORIDE SERPL-SCNC: 104 MMOL/L (ref 98–112)
CO2 SERPL-SCNC: 22 MMOL/L (ref 21–32)
COLOR UR: YELLOW
CREAT BLD-MCNC: 0.67 MG/DL
DEPRECATED RDW RBC AUTO: 38.9 FL (ref 35.1–46.3)
EOSINOPHIL # BLD AUTO: 0.08 X10(3) UL (ref 0–0.7)
EOSINOPHIL NFR BLD AUTO: 0.6 %
ERYTHROCYTE [DISTWIDTH] IN BLOOD BY AUTOMATED COUNT: 12.7 % (ref 11–15)
GLUCOSE BLD-MCNC: 81 MG/DL (ref 70–99)
GLUCOSE UR-MCNC: NEGATIVE MG/DL
HCT VFR BLD AUTO: 36.1 %
HGB BLD-MCNC: 11.7 G/DL
HGB UR QL STRIP.AUTO: NEGATIVE
IMM GRANULOCYTES # BLD AUTO: 0.06 X10(3) UL (ref 0–1)
IMM GRANULOCYTES NFR BLD: 0.5 %
KETONES UR-MCNC: 20 MG/DL
LEUKOCYTE ESTERASE UR QL STRIP.AUTO: NEGATIVE
LIPASE SERPL-CCNC: 247 U/L (ref 73–393)
LYMPHOCYTES # BLD AUTO: 1.95 X10(3) UL (ref 1–4)
LYMPHOCYTES NFR BLD AUTO: 14.8 %
MCH RBC QN AUTO: 27.7 PG (ref 26–34)
MCHC RBC AUTO-ENTMCNC: 32.4 G/DL (ref 31–37)
MCV RBC AUTO: 85.3 FL
MONOCYTES # BLD AUTO: 0.79 X10(3) UL (ref 0.1–1)
MONOCYTES NFR BLD AUTO: 6 %
NEUTROPHILS # BLD AUTO: 10.24 X10 (3) UL (ref 1.5–7.7)
NEUTROPHILS # BLD AUTO: 10.24 X10(3) UL (ref 1.5–7.7)
NEUTROPHILS NFR BLD AUTO: 77.8 %
NITRITE UR QL STRIP.AUTO: NEGATIVE
OSMOLALITY SERPL CALC.SUM OF ELEC: 276 MOSM/KG (ref 275–295)
PH UR: 6 [PH] (ref 5–8)
PLATELET # BLD AUTO: 304 10(3)UL (ref 150–450)
POTASSIUM SERPL-SCNC: 3.5 MMOL/L (ref 3.5–5.1)
PROT SERPL-MCNC: 7.3 G/DL (ref 6.4–8.2)
PROT UR-MCNC: NEGATIVE MG/DL
RBC # BLD AUTO: 4.23 X10(6)UL
SODIUM SERPL-SCNC: 134 MMOL/L (ref 136–145)
SP GR UR STRIP: 1.02 (ref 1–1.03)
UROBILINOGEN UR STRIP-ACNC: <2
VIT C UR-MCNC: NEGATIVE MG/DL
WBC # BLD AUTO: 13.2 X10(3) UL (ref 4–11)

## 2022-03-20 PROCEDURE — 59025 FETAL NON-STRESS TEST: CPT | Performed by: ADVANCED PRACTICE MIDWIFE

## 2022-03-20 PROCEDURE — 83690 ASSAY OF LIPASE: CPT | Performed by: EMERGENCY MEDICINE

## 2022-03-20 PROCEDURE — 80048 BASIC METABOLIC PNL TOTAL CA: CPT | Performed by: EMERGENCY MEDICINE

## 2022-03-20 PROCEDURE — 85025 COMPLETE CBC W/AUTO DIFF WBC: CPT | Performed by: EMERGENCY MEDICINE

## 2022-03-20 PROCEDURE — 80076 HEPATIC FUNCTION PANEL: CPT | Performed by: EMERGENCY MEDICINE

## 2022-03-20 PROCEDURE — 81003 URINALYSIS AUTO W/O SCOPE: CPT | Performed by: EMERGENCY MEDICINE

## 2022-03-20 PROCEDURE — 76705 ECHO EXAM OF ABDOMEN: CPT | Performed by: EMERGENCY MEDICINE

## 2022-03-20 PROCEDURE — 96360 HYDRATION IV INFUSION INIT: CPT

## 2022-03-20 PROCEDURE — 99284 EMERGENCY DEPT VISIT MOD MDM: CPT

## 2022-03-20 PROCEDURE — S0028 INJECTION, FAMOTIDINE, 20 MG: HCPCS | Performed by: EMERGENCY MEDICINE

## 2022-03-20 RX ORDER — FAMOTIDINE 20 MG/1
20 TABLET, FILM COATED ORAL 2 TIMES DAILY
Qty: 60 TABLET | Refills: 0 | Status: ON HOLD | OUTPATIENT
Start: 2022-03-20 | End: 2022-03-20

## 2022-03-20 RX ORDER — MAGNESIUM HYDROXIDE/ALUMINUM HYDROXICE/SIMETHICONE 120; 1200; 1200 MG/30ML; MG/30ML; MG/30ML
30 SUSPENSION ORAL ONCE
Status: DISCONTINUED | OUTPATIENT
Start: 2022-03-20 | End: 2022-03-20

## 2022-03-20 RX ORDER — FAMOTIDINE 10 MG/ML
20 INJECTION, SOLUTION INTRAVENOUS ONCE
Status: COMPLETED | OUTPATIENT
Start: 2022-03-20 | End: 2022-03-20

## 2022-03-20 RX ORDER — MAGNESIUM HYDROXIDE/ALUMINUM HYDROXICE/SIMETHICONE 120; 1200; 1200 MG/30ML; MG/30ML; MG/30ML
10 SUSPENSION ORAL 4 TIMES DAILY PRN
Qty: 200 ML | Refills: 0 | Status: SHIPPED | OUTPATIENT
Start: 2022-03-20 | End: 2022-03-25

## 2022-03-20 RX ORDER — FAMOTIDINE 10 MG/ML
20 INJECTION, SOLUTION INTRAVENOUS ONCE
Status: DISCONTINUED | OUTPATIENT
Start: 2022-03-20 | End: 2022-03-20

## 2022-03-20 RX ORDER — MAGNESIUM HYDROXIDE/ALUMINUM HYDROXICE/SIMETHICONE 120; 1200; 1200 MG/30ML; MG/30ML; MG/30ML
30 SUSPENSION ORAL 4 TIMES DAILY PRN
Status: DISCONTINUED | OUTPATIENT
Start: 2022-03-20 | End: 2022-03-20

## 2022-03-20 NOTE — PROGRESS NOTES
Pt is a 27year old female admitted to TR4/[facility]. Patient presents with:  Abdominal Pain: RUQ pain after eating gallbladder ultrasound shows gall bladder olyps     Pt is  29w5d intra-uterine pregnancy. History obtained, consents signed. Oriented to room, staff, and plan of care.

## 2022-03-20 NOTE — ED INITIAL ASSESSMENT (HPI)
Pt to the ed for complaints of pain in there right upper quadrant after eating. She is currently 29 weeks pregnant and was instructed to come in by her midwife.

## 2022-03-21 ENCOUNTER — TELEPHONE (OUTPATIENT)
Dept: OBGYN CLINIC | Facility: CLINIC | Age: 32
End: 2022-03-21

## 2022-03-21 ENCOUNTER — OFFICE VISIT (OUTPATIENT)
Dept: FAMILY MEDICINE CLINIC | Facility: CLINIC | Age: 32
End: 2022-03-21
Payer: COMMERCIAL

## 2022-03-21 ENCOUNTER — PATIENT OUTREACH (OUTPATIENT)
Dept: CASE MANAGEMENT | Age: 32
End: 2022-03-21

## 2022-03-21 VITALS
BODY MASS INDEX: 25.77 KG/M2 | RESPIRATION RATE: 19 BRPM | OXYGEN SATURATION: 100 % | SYSTOLIC BLOOD PRESSURE: 125 MMHG | WEIGHT: 174 LBS | DIASTOLIC BLOOD PRESSURE: 83 MMHG | HEART RATE: 76 BPM | HEIGHT: 69 IN

## 2022-03-21 DIAGNOSIS — R10.11 RUQ PAIN: Primary | ICD-10-CM

## 2022-03-21 DIAGNOSIS — K82.4 GALLBLADDER POLYP: ICD-10-CM

## 2022-03-21 PROCEDURE — 3079F DIAST BP 80-89 MM HG: CPT | Performed by: FAMILY MEDICINE

## 2022-03-21 PROCEDURE — 99214 OFFICE O/P EST MOD 30 MIN: CPT | Performed by: FAMILY MEDICINE

## 2022-03-21 PROCEDURE — 3008F BODY MASS INDEX DOCD: CPT | Performed by: FAMILY MEDICINE

## 2022-03-21 PROCEDURE — 3074F SYST BP LT 130 MM HG: CPT | Performed by: FAMILY MEDICINE

## 2022-03-21 NOTE — TELEPHONE ENCOUNTER
Received incoming call from Georgina Carrasco; calling on behalf of Heriberto Goncalves office, [facility]. Needs GI consult appointment as soon as possible due recent US gallbladder showing 2 polyps measuring 3 & 4 mm in maximal diameter. Midwife already recommended low fat diet with no improvements in symptoms. Reports (+) symptoms-- RUQ pain & in ED yesterday. Never seen with GI provider. Nurse Practitioner consult appropriate if sooner date/time can be accommodated per Kim Wright. Spoke with Corinne & confirmed patient name/. Offered next available with margarita Celaya which she accepted. Placed on wait list.     Verified time, location and to arrive 15 minutes early. Patient expressed understanding with no further questions or concerns at this time.      Future Appointments   Date Time Provider Elias Terrell   2022 11:00 AM Jayjay Sheppard

## 2022-03-21 NOTE — TELEPHONE ENCOUNTER
pts  states that the first avail appt for GI dept is at the end of May.  states that the pt was told to contact our office if she is not able to get in right away.

## 2022-03-21 NOTE — TELEPHONE ENCOUNTER
Pt seen over the weekend in triage for RUQ pain. Please call GI and see if we can get her in for an appt ASAP with GI or the NP in GI. Also please call and f/u with pt.  Thanks MJ

## 2022-03-21 NOTE — PROGRESS NOTES
Patient has apt previously scheduled for 5/26 and GI office will be reaching out to patient for sooner apt.

## 2022-03-22 ENCOUNTER — ROUTINE PRENATAL (OUTPATIENT)
Dept: OBGYN CLINIC | Facility: CLINIC | Age: 32
End: 2022-03-22
Payer: COMMERCIAL

## 2022-03-22 VITALS
BODY MASS INDEX: 26 KG/M2 | WEIGHT: 174.38 LBS | DIASTOLIC BLOOD PRESSURE: 82 MMHG | SYSTOLIC BLOOD PRESSURE: 119 MMHG | HEART RATE: 82 BPM

## 2022-03-22 DIAGNOSIS — Z34.82 ENCOUNTER FOR SUPERVISION OF OTHER NORMAL PREGNANCY IN SECOND TRIMESTER: Primary | ICD-10-CM

## 2022-03-22 LAB
APPEARANCE: CLEAR
BILIRUBIN: NEGATIVE
GLUCOSE (URINE DIPSTICK): NEGATIVE MG/DL
LEUKOCYTES: NEGATIVE
MULTISTIX LOT#: NORMAL NUMERIC
NITRITE, URINE: NEGATIVE
OCCULT BLOOD: NEGATIVE
PH, URINE: 5.5 (ref 4.5–8)
PROTEIN (URINE DIPSTICK): NEGATIVE MG/DL
SPECIFIC GRAVITY: 1 (ref 1–1.03)
URINE-COLOR: YELLOW
UROBILINOGEN,SEMI-QN: 0.2 MG/DL (ref 0–1.9)

## 2022-03-22 PROCEDURE — 3079F DIAST BP 80-89 MM HG: CPT | Performed by: ADVANCED PRACTICE MIDWIFE

## 2022-03-22 PROCEDURE — 90715 TDAP VACCINE 7 YRS/> IM: CPT | Performed by: ADVANCED PRACTICE MIDWIFE

## 2022-03-22 PROCEDURE — 3074F SYST BP LT 130 MM HG: CPT | Performed by: ADVANCED PRACTICE MIDWIFE

## 2022-03-22 PROCEDURE — 90471 IMMUNIZATION ADMIN: CPT | Performed by: ADVANCED PRACTICE MIDWIFE

## 2022-03-22 PROCEDURE — 81002 URINALYSIS NONAUTO W/O SCOPE: CPT | Performed by: ADVANCED PRACTICE MIDWIFE

## 2022-03-24 NOTE — PROGRESS NOTES
Active fetus Denies any complaints. Denies any vaginal bleeding, leaking of fluid or vaginal discharge. No signs signs of PTL. Reviewed S&S of PTL  Warning signs reviewed  All questions answered. Reports she still has abdominal pain after eating.   Dietary recommendations reviewed  Pt has appt with nutritionist and GI in April

## 2022-04-06 ENCOUNTER — LAB ENCOUNTER (OUTPATIENT)
Dept: LAB | Facility: HOSPITAL | Age: 32
End: 2022-04-06
Attending: ADVANCED PRACTICE MIDWIFE
Payer: COMMERCIAL

## 2022-04-06 ENCOUNTER — HOSPITAL ENCOUNTER (OUTPATIENT)
Dept: PERINATAL CARE | Facility: HOSPITAL | Age: 32
Discharge: HOME | End: 2022-04-06
Attending: OBSTETRICS & GYNECOLOGY
Payer: COMMERCIAL

## 2022-04-06 ENCOUNTER — TELEPHONE (OUTPATIENT)
Dept: OBGYN CLINIC | Facility: CLINIC | Age: 32
End: 2022-04-06

## 2022-04-06 ENCOUNTER — HOSPITAL ENCOUNTER (OUTPATIENT)
Dept: PERINATAL CARE | Facility: HOSPITAL | Age: 32
Discharge: HOME | End: 2022-04-06
Attending: ADVANCED PRACTICE MIDWIFE
Payer: COMMERCIAL

## 2022-04-06 ENCOUNTER — OFFICE VISIT (OUTPATIENT)
Dept: GASTROENTEROLOGY | Facility: CLINIC | Age: 32
End: 2022-04-06
Payer: COMMERCIAL

## 2022-04-06 ENCOUNTER — ROUTINE PRENATAL (OUTPATIENT)
Dept: OBGYN CLINIC | Facility: CLINIC | Age: 32
End: 2022-04-06
Payer: COMMERCIAL

## 2022-04-06 VITALS
BODY MASS INDEX: 26 KG/M2 | SYSTOLIC BLOOD PRESSURE: 109 MMHG | DIASTOLIC BLOOD PRESSURE: 71 MMHG | WEIGHT: 175 LBS | HEART RATE: 73 BPM

## 2022-04-06 VITALS
HEART RATE: 78 BPM | BODY MASS INDEX: 26 KG/M2 | SYSTOLIC BLOOD PRESSURE: 121 MMHG | DIASTOLIC BLOOD PRESSURE: 76 MMHG | WEIGHT: 175 LBS

## 2022-04-06 VITALS
HEIGHT: 69 IN | DIASTOLIC BLOOD PRESSURE: 81 MMHG | WEIGHT: 173 LBS | BODY MASS INDEX: 25.62 KG/M2 | SYSTOLIC BLOOD PRESSURE: 129 MMHG | HEART RATE: 84 BPM

## 2022-04-06 DIAGNOSIS — R10.11 RUQ PAIN: ICD-10-CM

## 2022-04-06 DIAGNOSIS — K82.4 GALLBLADDER POLYP: ICD-10-CM

## 2022-04-06 DIAGNOSIS — Z34.82 ENCOUNTER FOR SUPERVISION OF OTHER NORMAL PREGNANCY IN SECOND TRIMESTER: Primary | ICD-10-CM

## 2022-04-06 DIAGNOSIS — O26.10 LOW WEIGHT GAIN DURING PREGNANCY, ANTEPARTUM: Primary | ICD-10-CM

## 2022-04-06 DIAGNOSIS — K21.9 GASTROESOPHAGEAL REFLUX DISEASE, UNSPECIFIED WHETHER ESOPHAGITIS PRESENT: ICD-10-CM

## 2022-04-06 DIAGNOSIS — O26.10 LOW WEIGHT GAIN DURING PREGNANCY, ANTEPARTUM: ICD-10-CM

## 2022-04-06 DIAGNOSIS — R10.11 RUQ PAIN: Primary | ICD-10-CM

## 2022-04-06 DIAGNOSIS — R11.2 NAUSEA AND VOMITING, UNSPECIFIED VOMITING TYPE: ICD-10-CM

## 2022-04-06 DIAGNOSIS — R74.8 ELEVATED ALKALINE PHOSPHATASE LEVEL: ICD-10-CM

## 2022-04-06 LAB
ALBUMIN SERPL-MCNC: 3 G/DL (ref 3.4–5)
ALP LIVER SERPL-CCNC: 184 U/L
ALT SERPL-CCNC: 33 U/L
APPEARANCE: CLEAR
AST SERPL-CCNC: 27 U/L (ref 15–37)
BILIRUB DIRECT SERPL-MCNC: 0.1 MG/DL (ref 0–0.2)
BILIRUB SERPL-MCNC: 0.5 MG/DL (ref 0.1–2)
BILIRUBIN: NEGATIVE
GGT SERPL-CCNC: 17 U/L
GLUCOSE (URINE DIPSTICK): NEGATIVE MG/DL
KETONES (URINE DIPSTICK): NEGATIVE MG/DL
LEUKOCYTES: NEGATIVE
MULTISTIX LOT#: NORMAL NUMERIC
NITRITE, URINE: NEGATIVE
OCCULT BLOOD: NEGATIVE
PH, URINE: 5 (ref 4.5–8)
PROT SERPL-MCNC: 6.6 G/DL (ref 6.4–8.2)
PROTEIN (URINE DIPSTICK): NEGATIVE MG/DL
SPECIFIC GRAVITY: 1.01 (ref 1–1.03)
URINE-COLOR: YELLOW
UROBILINOGEN,SEMI-QN: 1 MG/DL (ref 0–1.9)

## 2022-04-06 PROCEDURE — 82977 ASSAY OF GGT: CPT

## 2022-04-06 PROCEDURE — 3008F BODY MASS INDEX DOCD: CPT | Performed by: NURSE PRACTITIONER

## 2022-04-06 PROCEDURE — 3074F SYST BP LT 130 MM HG: CPT | Performed by: ADVANCED PRACTICE MIDWIFE

## 2022-04-06 PROCEDURE — 80076 HEPATIC FUNCTION PANEL: CPT

## 2022-04-06 PROCEDURE — 99244 OFF/OP CNSLTJ NEW/EST MOD 40: CPT | Performed by: NURSE PRACTITIONER

## 2022-04-06 PROCEDURE — 76819 FETAL BIOPHYS PROFIL W/O NST: CPT

## 2022-04-06 PROCEDURE — 36415 COLL VENOUS BLD VENIPUNCTURE: CPT

## 2022-04-06 PROCEDURE — 76816 OB US FOLLOW-UP PER FETUS: CPT | Performed by: OBSTETRICS & GYNECOLOGY

## 2022-04-06 PROCEDURE — 3074F SYST BP LT 130 MM HG: CPT | Performed by: NURSE PRACTITIONER

## 2022-04-06 PROCEDURE — 3078F DIAST BP <80 MM HG: CPT | Performed by: ADVANCED PRACTICE MIDWIFE

## 2022-04-06 PROCEDURE — 3079F DIAST BP 80-89 MM HG: CPT | Performed by: NURSE PRACTITIONER

## 2022-04-06 PROCEDURE — 81002 URINALYSIS NONAUTO W/O SCOPE: CPT | Performed by: ADVANCED PRACTICE MIDWIFE

## 2022-04-06 RX ORDER — LANSOPRAZOLE 15 MG/1
CAPSULE, DELAYED RELEASE ORAL
COMMUNITY
Start: 2021-12-21

## 2022-04-06 NOTE — PATIENT INSTRUCTIONS
1. Book with many inspirational unmedicated natural birth stories: Lulu Mendez's Guide to Childbirth ($11)  [facility].     2. Sarahi Lewis 912 Hypnobirthing [facility] Study Course (free preview on YouTube, $55 for the whole download)  [employer].)  https://www.hypnIgea/product/hypnobirthing-home-study-oadkcr-1-wd8-album-pack-download/     3. Susan Samano' Hypnobirthing Audio Book and Complete MP3 Collection ($25)  [email or web address]    4. Alternative Hypnobirthing class:  https://thepositivebirthcompany. co.uk/

## 2022-04-06 NOTE — TELEPHONE ENCOUNTER
Please call pt had a growth ultrasound today not sure if they told her the results EFW 2353 g ( 5 lb 3 oz); 75%.

## 2022-04-06 NOTE — PROGRESS NOTES
Active fetus Denies any complaints. Reviewed growth ultrasound and findings GI symptoms somewhat improved-had GI appt today. Has a  from [facility]. Denies any vaginal bleeding, leaking of fluid or vaginal discharge. No signs signs of PTL. Reviewed S&S of PTL  Warning signs reviewed  All questions answered.

## 2022-04-11 ENCOUNTER — PATIENT MESSAGE (OUTPATIENT)
Dept: FAMILY MEDICINE CLINIC | Facility: CLINIC | Age: 32
End: 2022-04-11

## 2022-04-13 NOTE — TELEPHONE ENCOUNTER
Sent [employer] message to patient, to drop off copy or sent us attachment of vaccinations to add to her immunization record in epic.

## 2022-04-19 ENCOUNTER — OFFICE VISIT (OUTPATIENT)
Dept: SURGERY | Facility: CLINIC | Age: 32
End: 2022-04-19
Payer: COMMERCIAL

## 2022-04-19 VITALS — HEIGHT: 69 IN | WEIGHT: 175 LBS | BODY MASS INDEX: 25.92 KG/M2

## 2022-04-19 DIAGNOSIS — R10.11 RIGHT UPPER QUADRANT PAIN: Primary | ICD-10-CM

## 2022-04-19 DIAGNOSIS — K82.4 GALLBLADDER POLYP: ICD-10-CM

## 2022-04-19 PROCEDURE — 99203 OFFICE O/P NEW LOW 30 MIN: CPT | Performed by: SURGERY

## 2022-04-19 PROCEDURE — 3008F BODY MASS INDEX DOCD: CPT | Performed by: SURGERY

## 2022-04-19 NOTE — PATIENT INSTRUCTIONS
Call the office at a convenient time minimum 2 weeks postpartum to arrange for laparoscopic cholecystectomy outpatient

## 2022-04-21 ENCOUNTER — ROUTINE PRENATAL (OUTPATIENT)
Dept: OBGYN CLINIC | Facility: CLINIC | Age: 32
End: 2022-04-21
Payer: COMMERCIAL

## 2022-04-21 VITALS
WEIGHT: 176 LBS | BODY MASS INDEX: 26 KG/M2 | SYSTOLIC BLOOD PRESSURE: 116 MMHG | DIASTOLIC BLOOD PRESSURE: 82 MMHG | HEART RATE: 93 BPM

## 2022-04-21 DIAGNOSIS — Z34.82 ENCOUNTER FOR SUPERVISION OF OTHER NORMAL PREGNANCY IN SECOND TRIMESTER: Primary | ICD-10-CM

## 2022-04-21 LAB
APPEARANCE: CLEAR
BILIRUBIN: NEGATIVE
GLUCOSE (URINE DIPSTICK): NEGATIVE MG/DL
KETONES (URINE DIPSTICK): NEGATIVE MG/DL
MULTISTIX LOT#: ABNORMAL NUMERIC
NITRITE, URINE: NEGATIVE
OCCULT BLOOD: NEGATIVE
PH, URINE: 5 (ref 4.5–8)
PROTEIN (URINE DIPSTICK): NEGATIVE MG/DL
SPECIFIC GRAVITY: 1.03 (ref 1–1.03)
URINE-COLOR: YELLOW
UROBILINOGEN,SEMI-QN: 1 MG/DL (ref 0–1.9)

## 2022-04-21 PROCEDURE — 81002 URINALYSIS NONAUTO W/O SCOPE: CPT | Performed by: ADVANCED PRACTICE MIDWIFE

## 2022-04-21 PROCEDURE — 3079F DIAST BP 80-89 MM HG: CPT | Performed by: ADVANCED PRACTICE MIDWIFE

## 2022-04-21 PROCEDURE — 3074F SYST BP LT 130 MM HG: CPT | Performed by: ADVANCED PRACTICE MIDWIFE

## 2022-04-21 NOTE — PROGRESS NOTES
Here with partner. Had surgery consult and plan for cholecystectomy postpartum. Patient is satisfied with this plan. Endorses regular fetal movement. Denies LOF, vaginal bleeding, contractions. Had a lightheadedness when lying on back for FHTs. Resolved with position change. Reviewed warning signs and when to call.  ERICKA 2wks

## 2022-05-03 ENCOUNTER — ROUTINE PRENATAL (OUTPATIENT)
Dept: OBGYN CLINIC | Facility: CLINIC | Age: 32
End: 2022-05-03
Payer: COMMERCIAL

## 2022-05-03 VITALS
WEIGHT: 179 LBS | BODY MASS INDEX: 26 KG/M2 | HEART RATE: 81 BPM | DIASTOLIC BLOOD PRESSURE: 85 MMHG | SYSTOLIC BLOOD PRESSURE: 120 MMHG

## 2022-05-03 DIAGNOSIS — Z34.83 ENCOUNTER FOR SUPERVISION OF OTHER NORMAL PREGNANCY IN THIRD TRIMESTER: Primary | ICD-10-CM

## 2022-05-03 PROBLEM — Z3A.29 29 WEEKS GESTATION OF PREGNANCY (HCC): Status: RESOLVED | Noted: 2022-03-20 | Resolved: 2022-05-03

## 2022-05-03 PROBLEM — Z3A.29 29 WEEKS GESTATION OF PREGNANCY: Status: RESOLVED | Noted: 2022-03-20 | Resolved: 2022-05-03

## 2022-05-03 LAB
APPEARANCE: CLEAR
BILIRUBIN: NEGATIVE
GLUCOSE (URINE DIPSTICK): NEGATIVE MG/DL
KETONES (URINE DIPSTICK): NEGATIVE MG/DL
LEUKOCYTES: NEGATIVE
MULTISTIX LOT#: NORMAL NUMERIC
NITRITE, URINE: NEGATIVE
OCCULT BLOOD: NEGATIVE
PH, URINE: 6 (ref 4.5–8)
PROTEIN (URINE DIPSTICK): NEGATIVE MG/DL
SPECIFIC GRAVITY: 1 (ref 1–1.03)
URINE-COLOR: CLEAR
UROBILINOGEN,SEMI-QN: 0.2 MG/DL (ref 0–1.9)

## 2022-05-03 PROCEDURE — 3074F SYST BP LT 130 MM HG: CPT | Performed by: ADVANCED PRACTICE MIDWIFE

## 2022-05-03 PROCEDURE — 81002 URINALYSIS NONAUTO W/O SCOPE: CPT | Performed by: ADVANCED PRACTICE MIDWIFE

## 2022-05-03 PROCEDURE — 3079F DIAST BP 80-89 MM HG: CPT | Performed by: ADVANCED PRACTICE MIDWIFE

## 2022-05-03 NOTE — PROGRESS NOTES
Baby active. No signs PTL. No bleeding or LOF. GBS today. Bring birth plan to nv. SOL and warning signs reviewed.  36 wk packet given

## 2022-05-05 LAB — GROUP B STREP BY PCR FOR PCR OVT: NEGATIVE

## 2022-05-10 ENCOUNTER — TELEPHONE (OUTPATIENT)
Dept: PERINATAL CARE | Facility: HOSPITAL | Age: 32
End: 2022-05-10

## 2022-05-10 ENCOUNTER — ROUTINE PRENATAL (OUTPATIENT)
Dept: OBGYN CLINIC | Facility: CLINIC | Age: 32
End: 2022-05-10
Payer: COMMERCIAL

## 2022-05-10 VITALS
BODY MASS INDEX: 26 KG/M2 | DIASTOLIC BLOOD PRESSURE: 88 MMHG | HEART RATE: 106 BPM | WEIGHT: 177 LBS | SYSTOLIC BLOOD PRESSURE: 124 MMHG

## 2022-05-10 DIAGNOSIS — O26.13 INSUFFICIENT WEIGHT GAIN DURING PREGNANCY IN THIRD TRIMESTER: Primary | ICD-10-CM

## 2022-05-10 DIAGNOSIS — O26.843 FUNDAL HEIGHT LOW FOR DATES IN THIRD TRIMESTER: ICD-10-CM

## 2022-05-10 DIAGNOSIS — O26.10 LOW WEIGHT GAIN DURING PREGNANCY, ANTEPARTUM: ICD-10-CM

## 2022-05-10 LAB
APPEARANCE: CLEAR
BILIRUBIN: NEGATIVE
GLUCOSE (URINE DIPSTICK): NEGATIVE MG/DL
KETONES (URINE DIPSTICK): NEGATIVE MG/DL
LEUKOCYTES: NEGATIVE
MULTISTIX LOT#: NORMAL NUMERIC
NITRITE, URINE: NEGATIVE
OCCULT BLOOD: NEGATIVE
PH, URINE: 6.5 (ref 4.5–8)
PROTEIN (URINE DIPSTICK): NEGATIVE MG/DL
SPECIFIC GRAVITY: 1.02 (ref 1–1.03)
URINE-COLOR: YELLOW
UROBILINOGEN,SEMI-QN: 1 MG/DL (ref 0–1.9)

## 2022-05-10 PROCEDURE — 3079F DIAST BP 80-89 MM HG: CPT | Performed by: ADVANCED PRACTICE MIDWIFE

## 2022-05-10 PROCEDURE — 81002 URINALYSIS NONAUTO W/O SCOPE: CPT | Performed by: ADVANCED PRACTICE MIDWIFE

## 2022-05-10 PROCEDURE — 3074F SYST BP LT 130 MM HG: CPT | Performed by: ADVANCED PRACTICE MIDWIFE

## 2022-05-10 NOTE — TELEPHONE ENCOUNTER
Returned patients call to schedule appointment. Patient informed we do not have an order.   Patient will contact OB office

## 2022-05-10 NOTE — PROGRESS NOTES
Active fetus . Denies any leaking of fluid or bleeding. Reviewed S&S labor  Warning signs reviewed  All questions answered. Patient reports that upper quadrant pan continues and that she is only able to eat small amounts  Denies any vomiting.   2 pound weight loss since 5/3    Growth ultrasound ordered  IOL scheduled for 5/24 6pm

## 2022-05-11 ENCOUNTER — PATIENT MESSAGE (OUTPATIENT)
Dept: OBGYN CLINIC | Facility: CLINIC | Age: 32
End: 2022-05-11

## 2022-05-11 ENCOUNTER — HOSPITAL ENCOUNTER (OUTPATIENT)
Dept: PERINATAL CARE | Facility: HOSPITAL | Age: 32
Discharge: HOME | End: 2022-05-11
Attending: ADVANCED PRACTICE MIDWIFE
Payer: COMMERCIAL

## 2022-05-11 ENCOUNTER — HOSPITAL ENCOUNTER (OUTPATIENT)
Dept: PERINATAL CARE | Facility: HOSPITAL | Age: 32
Discharge: HOME | End: 2022-05-11
Attending: OBSTETRICS & GYNECOLOGY
Payer: COMMERCIAL

## 2022-05-11 VITALS — SYSTOLIC BLOOD PRESSURE: 127 MMHG | HEART RATE: 98 BPM | DIASTOLIC BLOOD PRESSURE: 87 MMHG

## 2022-05-11 DIAGNOSIS — F32.9 MAJOR DEPRESSIVE DISORDER: ICD-10-CM

## 2022-05-11 DIAGNOSIS — D25.1 INTRAMURAL LEIOMYOMA OF UTERUS: ICD-10-CM

## 2022-05-11 DIAGNOSIS — N97.9 FEMALE INFERTILITY: Primary | ICD-10-CM

## 2022-05-11 DIAGNOSIS — O26.843 FUNDAL HEIGHT LOW FOR DATES IN THIRD TRIMESTER: ICD-10-CM

## 2022-05-11 DIAGNOSIS — N97.9 FEMALE INFERTILITY: ICD-10-CM

## 2022-05-11 PROCEDURE — 76819 FETAL BIOPHYS PROFIL W/O NST: CPT

## 2022-05-11 PROCEDURE — 76816 OB US FOLLOW-UP PER FETUS: CPT | Performed by: OBSTETRICS & GYNECOLOGY

## 2022-05-13 NOTE — TELEPHONE ENCOUNTER
Notified pt of results per Karlee Singh. Pt verbalized an understanding & agrees w/ plan    Please call patient. Let her know baby is in the 41%ile and head down. It was a reassuring ultrasound. No concerns!

## 2022-05-18 ENCOUNTER — ROUTINE PRENATAL (OUTPATIENT)
Dept: OBGYN CLINIC | Facility: CLINIC | Age: 32
End: 2022-05-18
Payer: COMMERCIAL

## 2022-05-18 VITALS
WEIGHT: 180 LBS | BODY MASS INDEX: 27 KG/M2 | DIASTOLIC BLOOD PRESSURE: 85 MMHG | SYSTOLIC BLOOD PRESSURE: 129 MMHG | HEART RATE: 96 BPM

## 2022-05-18 DIAGNOSIS — Z11.3 SCREEN FOR STD (SEXUALLY TRANSMITTED DISEASE): ICD-10-CM

## 2022-05-18 DIAGNOSIS — Z34.83 ENCOUNTER FOR SUPERVISION OF OTHER NORMAL PREGNANCY IN THIRD TRIMESTER: Primary | ICD-10-CM

## 2022-05-18 PROBLEM — J45.909 ASTHMA (HCC): Status: ACTIVE | Noted: 2022-05-18

## 2022-05-18 PROBLEM — J45.909 ASTHMA: Status: ACTIVE | Noted: 2022-05-18

## 2022-05-18 LAB
APPEARANCE: CLEAR
BILIRUBIN: NEGATIVE
GLUCOSE (URINE DIPSTICK): NEGATIVE MG/DL
KETONES (URINE DIPSTICK): NEGATIVE MG/DL
LEUKOCYTES: NEGATIVE
MULTISTIX LOT#: NORMAL NUMERIC
NITRITE, URINE: NEGATIVE
OCCULT BLOOD: NEGATIVE
PH, URINE: 6.5 (ref 4.5–8)
PROTEIN (URINE DIPSTICK): NEGATIVE MG/DL
SPECIFIC GRAVITY: 1.01 (ref 1–1.03)
URINE-COLOR: YELLOW
UROBILINOGEN,SEMI-QN: 0.2 MG/DL (ref 0–1.9)

## 2022-05-18 PROCEDURE — 3079F DIAST BP 80-89 MM HG: CPT | Performed by: ADVANCED PRACTICE MIDWIFE

## 2022-05-18 PROCEDURE — 81002 URINALYSIS NONAUTO W/O SCOPE: CPT | Performed by: ADVANCED PRACTICE MIDWIFE

## 2022-05-18 PROCEDURE — 3074F SYST BP LT 130 MM HG: CPT | Performed by: ADVANCED PRACTICE MIDWIFE

## 2022-05-18 NOTE — PROGRESS NOTES
Paola Ma, is at 38w1d, here for her Johanna Dumont 0057 visit. Currently, she is feeling well. No N/V x24 hours. Denies 3rd trimester danger signs. IOL scheduled for next week. Reports she has been self-expressing colostrum for the past week and wants to know if she should bring it to the hospital with her. Was told to do this by her . Birth plan reviewed:    Support:  and  (Tova Fair)  Pain management: unmedicated but open  Vitamin K: yes  Erythromycin ointment: yes  Placenta: no special plans  Circumcision: no  Feeding method: breast/has pump  Housing/car seat: stable/yes  Contraception: condoms? ?    Vital signs and weight reviewed  See flowsheets     Today's Assessment/Plan: Care is up to date  Next visit: routine postpartum care    Reviewed:   Prenatal visit schedule  Kick counts  Danger signs  Labor precautions  IOL process  Risks of expressing breast milk to induce labor without monitoring reviewed    Pt verbalized understanding. All questions answered.  No barriers to learning identified

## 2022-05-20 ENCOUNTER — TELEPHONE (OUTPATIENT)
Dept: OBGYN UNIT | Facility: HOSPITAL | Age: 32
End: 2022-05-20

## 2022-05-20 LAB
C TRACH DNA SPEC QL NAA+PROBE: NEGATIVE
N GONORRHOEA DNA SPEC QL NAA+PROBE: NEGATIVE

## 2022-05-24 ENCOUNTER — APPOINTMENT (OUTPATIENT)
Dept: OBGYN CLINIC | Facility: HOSPITAL | Age: 32
End: 2022-05-24
Payer: COMMERCIAL

## 2022-05-24 ENCOUNTER — HOSPITAL ENCOUNTER (INPATIENT)
Facility: HOSPITAL | Age: 32
LOS: 2 days | Discharge: HOME | End: 2022-05-26
Attending: ADVANCED PRACTICE MIDWIFE | Admitting: OBSTETRICS & GYNECOLOGY
Payer: COMMERCIAL

## 2022-05-24 ENCOUNTER — ANESTHESIA (OUTPATIENT)
Dept: OBGYN UNIT | Facility: HOSPITAL | Age: 32
End: 2022-05-24
Payer: COMMERCIAL

## 2022-05-24 ENCOUNTER — ANESTHESIA EVENT (OUTPATIENT)
Dept: OBGYN UNIT | Facility: HOSPITAL | Age: 32
End: 2022-05-24
Payer: COMMERCIAL

## 2022-05-24 ENCOUNTER — TELEPHONE (OUTPATIENT)
Dept: OBGYN CLINIC | Facility: CLINIC | Age: 32
End: 2022-05-24

## 2022-05-24 LAB
ALBUMIN SERPL-MCNC: 2.9 G/DL (ref 3.4–5)
ALBUMIN/GLOB SERPL: 0.6 {RATIO} (ref 1–2)
ALP LIVER SERPL-CCNC: 302 U/L
ALT SERPL-CCNC: 21 U/L
ANION GAP SERPL CALC-SCNC: 8 MMOL/L (ref 0–18)
ANTIBODY SCREEN: NEGATIVE
AST SERPL-CCNC: 26 U/L (ref 15–37)
BASOPHILS # BLD AUTO: 0.04 X10(3) UL (ref 0–0.2)
BASOPHILS NFR BLD AUTO: 0.3 %
BILIRUB SERPL-MCNC: 0.5 MG/DL (ref 0.1–2)
BUN BLD-MCNC: 8 MG/DL (ref 7–18)
BUN/CREAT SERPL: 10.8 (ref 10–20)
CALCIUM BLD-MCNC: 9.4 MG/DL (ref 8.5–10.1)
CHLORIDE SERPL-SCNC: 107 MMOL/L (ref 98–112)
CO2 SERPL-SCNC: 19 MMOL/L (ref 21–32)
CREAT BLD-MCNC: 0.74 MG/DL
CREAT UR-SCNC: 138 MG/DL
DEPRECATED RDW RBC AUTO: 38 FL (ref 35.1–46.3)
EOSINOPHIL # BLD AUTO: 0.06 X10(3) UL (ref 0–0.7)
EOSINOPHIL NFR BLD AUTO: 0.4 %
ERYTHROCYTE [DISTWIDTH] IN BLOOD BY AUTOMATED COUNT: 14.2 % (ref 11–15)
GLOBULIN PLAS-MCNC: 4.5 G/DL (ref 2.8–4.4)
GLUCOSE BLD-MCNC: 83 MG/DL (ref 70–99)
HCT VFR BLD AUTO: 34.8 %
HGB BLD-MCNC: 11 G/DL
IMM GRANULOCYTES # BLD AUTO: 0.06 X10(3) UL (ref 0–1)
IMM GRANULOCYTES NFR BLD: 0.4 %
LYMPHOCYTES # BLD AUTO: 3.14 X10(3) UL (ref 1–4)
LYMPHOCYTES NFR BLD AUTO: 21 %
MCH RBC QN AUTO: 23.9 PG (ref 26–34)
MCHC RBC AUTO-ENTMCNC: 31.6 G/DL (ref 31–37)
MCV RBC AUTO: 75.5 FL
MONOCYTES # BLD AUTO: 0.96 X10(3) UL (ref 0.1–1)
MONOCYTES NFR BLD AUTO: 6.4 %
NEUTROPHILS # BLD AUTO: 10.66 X10 (3) UL (ref 1.5–7.7)
NEUTROPHILS # BLD AUTO: 10.66 X10(3) UL (ref 1.5–7.7)
NEUTROPHILS NFR BLD AUTO: 71.5 %
OSMOLALITY SERPL CALC.SUM OF ELEC: 275 MOSM/KG (ref 275–295)
PLATELET # BLD AUTO: 318 10(3)UL (ref 150–450)
POTASSIUM SERPL-SCNC: 3.5 MMOL/L (ref 3.5–5.1)
PROT SERPL-MCNC: 7.4 G/DL (ref 6.4–8.2)
PROT UR-MCNC: 24.4 MG/DL
PROT/CREAT UR-RTO: 0.18
RBC # BLD AUTO: 4.61 X10(6)UL
RH BLOOD TYPE: POSITIVE
SARS-COV-2 RNA RESP QL NAA+PROBE: NOT DETECTED
SODIUM SERPL-SCNC: 134 MMOL/L (ref 136–145)
WBC # BLD AUTO: 14.9 X10(3) UL (ref 4–11)

## 2022-05-24 PROCEDURE — 59400 OBSTETRICAL CARE: CPT | Performed by: ADVANCED PRACTICE MIDWIFE

## 2022-05-24 PROCEDURE — 10H073Z INSERTION OF MONITORING ELECTRODE INTO PRODUCTS OF CONCEPTION, VIA NATURAL OR ARTIFICIAL OPENING: ICD-10-PCS | Performed by: ADVANCED PRACTICE MIDWIFE

## 2022-05-24 RX ORDER — LIDOCAINE HYDROCHLORIDE 10 MG/ML
30 INJECTION, SOLUTION EPIDURAL; INFILTRATION; INTRACAUDAL; PERINEURAL ONCE
Status: COMPLETED | OUTPATIENT
Start: 2022-05-24 | End: 2022-05-24

## 2022-05-24 RX ORDER — LIDOCAINE HYDROCHLORIDE 10 MG/ML
INJECTION, SOLUTION INFILTRATION; PERINEURAL
Status: COMPLETED | OUTPATIENT
Start: 2022-05-24 | End: 2022-05-24

## 2022-05-24 RX ORDER — LORATADINE 10 MG/1
10 TABLET ORAL DAILY
COMMUNITY

## 2022-05-24 RX ORDER — NALBUPHINE HCL 10 MG/ML
2.5 AMPUL (ML) INJECTION
Status: DISCONTINUED | OUTPATIENT
Start: 2022-05-24 | End: 2022-05-25

## 2022-05-24 RX ORDER — DOCUSATE SODIUM 100 MG/1
100 CAPSULE, LIQUID FILLED ORAL
Status: DISCONTINUED | OUTPATIENT
Start: 2022-05-24 | End: 2022-05-26

## 2022-05-24 RX ORDER — BUPIVACAINE HYDROCHLORIDE 2.5 MG/ML
20 INJECTION, SOLUTION EPIDURAL; INFILTRATION; INTRACAUDAL ONCE
Status: DISCONTINUED | OUTPATIENT
Start: 2022-05-24 | End: 2022-05-24 | Stop reason: HOSPADM

## 2022-05-24 RX ORDER — ACETAMINOPHEN 500 MG
500 TABLET ORAL EVERY 6 HOURS PRN
Status: DISCONTINUED | OUTPATIENT
Start: 2022-05-24 | End: 2022-05-26

## 2022-05-24 RX ORDER — IBUPROFEN 600 MG/1
600 TABLET ORAL EVERY 6 HOURS PRN
Status: DISCONTINUED | OUTPATIENT
Start: 2022-05-24 | End: 2022-05-24 | Stop reason: HOSPADM

## 2022-05-24 RX ORDER — ACETAMINOPHEN 500 MG
1000 TABLET ORAL EVERY 6 HOURS PRN
Status: DISCONTINUED | OUTPATIENT
Start: 2022-05-24 | End: 2022-05-26

## 2022-05-24 RX ORDER — BUPIVACAINE HCL/0.9 % NACL/PF 0.25 %
5 PLASTIC BAG, INJECTION (ML) EPIDURAL AS NEEDED
Status: DISCONTINUED | OUTPATIENT
Start: 2022-05-24 | End: 2022-05-25

## 2022-05-24 RX ORDER — SODIUM CHLORIDE, SODIUM LACTATE, POTASSIUM CHLORIDE, CALCIUM CHLORIDE 600; 310; 30; 20 MG/100ML; MG/100ML; MG/100ML; MG/100ML
INJECTION, SOLUTION INTRAVENOUS CONTINUOUS
Status: DISCONTINUED | OUTPATIENT
Start: 2022-05-24 | End: 2022-05-25

## 2022-05-24 RX ORDER — DIAPER,BRIEF,INFANT-TODD,DISP
1 EACH MISCELLANEOUS EVERY 6 HOURS PRN
Status: DISCONTINUED | OUTPATIENT
Start: 2022-05-24 | End: 2022-05-26

## 2022-05-24 RX ORDER — BUPIVACAINE HYDROCHLORIDE 2.5 MG/ML
INJECTION, SOLUTION EPIDURAL; INFILTRATION; INTRACAUDAL
Status: COMPLETED | OUTPATIENT
Start: 2022-05-24 | End: 2022-05-24

## 2022-05-24 RX ORDER — ACETAMINOPHEN 500 MG
500 TABLET ORAL EVERY 6 HOURS PRN
Status: DISCONTINUED | OUTPATIENT
Start: 2022-05-24 | End: 2022-05-24 | Stop reason: HOSPADM

## 2022-05-24 RX ORDER — BISACODYL 10 MG
10 SUPPOSITORY, RECTAL RECTAL ONCE AS NEEDED
Status: DISCONTINUED | OUTPATIENT
Start: 2022-05-24 | End: 2022-05-26

## 2022-05-24 RX ORDER — ONDANSETRON 2 MG/ML
4 INJECTION INTRAMUSCULAR; INTRAVENOUS EVERY 6 HOURS PRN
Status: DISCONTINUED | OUTPATIENT
Start: 2022-05-24 | End: 2022-05-26

## 2022-05-24 RX ORDER — AMMONIA INHALANTS 0.04 G/.3ML
0.3 INHALANT RESPIRATORY (INHALATION) AS NEEDED
Status: DISCONTINUED | OUTPATIENT
Start: 2022-05-24 | End: 2022-05-24 | Stop reason: HOSPADM

## 2022-05-24 RX ORDER — ONDANSETRON 2 MG/ML
4 INJECTION INTRAMUSCULAR; INTRAVENOUS EVERY 6 HOURS PRN
Status: DISCONTINUED | OUTPATIENT
Start: 2022-05-24 | End: 2022-05-24 | Stop reason: HOSPADM

## 2022-05-24 RX ORDER — LIDOCAINE HYDROCHLORIDE AND EPINEPHRINE 15; 5 MG/ML; UG/ML
INJECTION, SOLUTION EPIDURAL
Status: COMPLETED | OUTPATIENT
Start: 2022-05-24 | End: 2022-05-24

## 2022-05-24 RX ORDER — AMMONIA INHALANTS 0.04 G/.3ML
0.3 INHALANT RESPIRATORY (INHALATION) AS NEEDED
Status: DISCONTINUED | OUTPATIENT
Start: 2022-05-24 | End: 2022-05-26

## 2022-05-24 RX ORDER — SIMETHICONE 80 MG
80 TABLET,CHEWABLE ORAL 3 TIMES DAILY PRN
Status: DISCONTINUED | OUTPATIENT
Start: 2022-05-24 | End: 2022-05-26

## 2022-05-24 RX ORDER — TERBUTALINE SULFATE 1 MG/ML
0.25 INJECTION, SOLUTION SUBCUTANEOUS AS NEEDED
Status: DISCONTINUED | OUTPATIENT
Start: 2022-05-24 | End: 2022-05-24 | Stop reason: HOSPADM

## 2022-05-24 RX ORDER — TRISODIUM CITRATE DIHYDRATE AND CITRIC ACID MONOHYDRATE 500; 334 MG/5ML; MG/5ML
30 SOLUTION ORAL AS NEEDED
Status: DISCONTINUED | OUTPATIENT
Start: 2022-05-24 | End: 2022-05-24 | Stop reason: HOSPADM

## 2022-05-24 RX ORDER — CALCIUM CARBONATE 200(500)MG
1000 TABLET,CHEWABLE ORAL EVERY 6 HOURS PRN
Status: DISCONTINUED | OUTPATIENT
Start: 2022-05-24 | End: 2022-05-25

## 2022-05-24 RX ORDER — CALCIUM CARBONATE 200(500)MG
1000 TABLET,CHEWABLE ORAL ONCE
Status: DISCONTINUED | OUTPATIENT
Start: 2022-05-24 | End: 2022-05-24

## 2022-05-24 RX ORDER — IBUPROFEN 600 MG/1
600 TABLET ORAL EVERY 6 HOURS
Status: DISCONTINUED | OUTPATIENT
Start: 2022-05-24 | End: 2022-05-26

## 2022-05-24 RX ADMIN — BUPIVACAINE HYDROCHLORIDE 10 ML: 2.5 INJECTION, SOLUTION EPIDURAL; INFILTRATION; INTRACAUDAL at 15:55:00

## 2022-05-24 RX ADMIN — LIDOCAINE HYDROCHLORIDE 5 ML: 10 INJECTION, SOLUTION INFILTRATION; PERINEURAL at 15:55:00

## 2022-05-24 RX ADMIN — LIDOCAINE HYDROCHLORIDE AND EPINEPHRINE 3 ML: 15; 5 INJECTION, SOLUTION EPIDURAL at 15:55:00

## 2022-05-24 NOTE — TELEPHONE ENCOUNTER
Patient called stated she has an induction schedule for tonight but she has been nelda since the middle of the night. Contraction are every 10 minutes, lasting a minute long. Patient is able to walk and talk through contractions. Noticed she has been leaking clear fluid since 4am. Baby active, GBS negative. Advised, continue to monitor, push fluids, can get in the shower for comfort. Patient advised will call cnm on call and will call with recommendations.

## 2022-05-24 NOTE — TELEPHONE ENCOUNTER
Spoke to patient, advised of mbw recommendations.  Patient agrees with plan and verbally understands

## 2022-05-24 NOTE — ANESTHESIA PROCEDURE NOTES
Labor Analgesia    Date/Time: 5/24/2022 3:55 PM  Performed by: Raymon Nova MD  Authorized by: Raymon Nova MD       General Information and Staff    Start Time:  5/24/2022 3:50 PM  End Time:  5/24/2022 4:16 PM  Anesthesiologist:  Raymon Nova MD  Performed by:   Anesthesiologist  Patient Location:  OB  Site Identification: surface landmarks    Reason for Block: labor epidural    Preanesthetic Checklist: patient identified, IV checked, site marked, risks and benefits discussed, monitors and equipment checked, pre-op evaluation, timeout performed, anesthesia consent and sterile technique used      Procedure Details    Patient Position:  Sitting  Prep: ChloraPrep    Monitoring:  Heart rate  Approach:  Midline    Epidural Needle    Injection Technique:  SONIA air  Needle Type:  Tuohy  Needle Gauge:  18 G  Needle Length:  3.5 in  Location:  L3-4    Spinal Needle      Catheter    Catheter Type:  Multi-orifice  Catheter Size:  20 G  Test Dose:  Negative    Assessment      Additional Comments

## 2022-05-24 NOTE — TELEPHONE ENCOUNTER
Patient call stated her contractions are now every 4 min. Lasting 45 seconds for the last hour. Is unable to talk through contractions. Baby active. No change in fluid color. Patient advised she can go to triage for evaluation. mbw on call notified.

## 2022-05-24 NOTE — TELEPHONE ENCOUNTER
Per mbw, patient can stay home until the induction time if labor does not progress. If she notices fluid color changes to green, decrease in fetal movement, contraction are every 5 min, lasting a min long for at least an hour, patient is to notify office. Attempted to contact patient. Phone rings than received a busy tone.

## 2022-05-24 NOTE — PROGRESS NOTES
In room for fetal heart rate deceleration. Pt recently received epidural. She was on hands and knees when entering. IV fluids were bolusing and BP was 90/60. Cervix 6-7/90/+1 station. She was rolled to her side and FSE was placed. BP then 50/80s and ephedrine given. BP resolving and fetal heart tones 120s with variable decelerations. [facility] notified.

## 2022-05-25 LAB
BASOPHILS # BLD AUTO: 0.04 X10(3) UL (ref 0–0.2)
BASOPHILS NFR BLD AUTO: 0.2 %
DEPRECATED RDW RBC AUTO: 39.8 FL (ref 35.1–46.3)
EOSINOPHIL # BLD AUTO: 0.01 X10(3) UL (ref 0–0.7)
EOSINOPHIL NFR BLD AUTO: 0 %
ERYTHROCYTE [DISTWIDTH] IN BLOOD BY AUTOMATED COUNT: 14.3 % (ref 11–15)
HCT VFR BLD AUTO: 30 %
HGB BLD-MCNC: 9.6 G/DL
IMM GRANULOCYTES # BLD AUTO: 0.11 X10(3) UL (ref 0–1)
IMM GRANULOCYTES NFR BLD: 0.5 %
LYMPHOCYTES # BLD AUTO: 2.18 X10(3) UL (ref 1–4)
LYMPHOCYTES NFR BLD AUTO: 10.6 %
MCH RBC QN AUTO: 24.6 PG (ref 26–34)
MCHC RBC AUTO-ENTMCNC: 32 G/DL (ref 31–37)
MCV RBC AUTO: 76.9 FL
MONOCYTES # BLD AUTO: 1.02 X10(3) UL (ref 0.1–1)
MONOCYTES NFR BLD AUTO: 4.9 %
NEUTROPHILS # BLD AUTO: 17.26 X10 (3) UL (ref 1.5–7.7)
NEUTROPHILS # BLD AUTO: 17.26 X10(3) UL (ref 1.5–7.7)
NEUTROPHILS NFR BLD AUTO: 83.8 %
PLATELET # BLD AUTO: 267 10(3)UL (ref 150–450)
RBC # BLD AUTO: 3.9 X10(6)UL
WBC # BLD AUTO: 20.6 X10(3) UL (ref 4–11)

## 2022-05-25 RX ORDER — PANTOPRAZOLE SODIUM 40 MG/1
40 TABLET, DELAYED RELEASE ORAL
Status: DISCONTINUED | OUTPATIENT
Start: 2022-05-25 | End: 2022-05-25

## 2022-05-25 RX ORDER — MELATONIN
325
Status: DISCONTINUED | OUTPATIENT
Start: 2022-05-25 | End: 2022-05-26

## 2022-05-25 NOTE — L&D DELIVERY NOTE
Dejon Mesa [[id number]]    Labor Events     labor?: No   steroids?: None  Antibiotics received during labor?: No  Antibiotics (enter # doses in comment): none  Rupture date/time: 2022     Rupture type: SROM  Fluid color: Clear  Induction: None  Augmentation: None  Intrapartum & labor complications: Variable decelerations, Late decelerations  Intrapartum & labor complications comment: Prolonged decel     Labor Event Times    Labor onset date/time: 2022  Dilation complete date/time: 2022  Start pushing date/time: 2022      Presentation    Presentation: Vertex     Operative Delivery    Operative Vaginal Delivery: No            Shoulder Dystocia    Shoulder Dystocia: No     Anesthesia    Method: Epidural           Delivery    Head delivery date/time: 2022 86:92:53   Delivery date/time:  22 20:31:33   Delivery type: Normal spontaneous vaginal delivery    Details:     Delivery location: delivery room  Delivery Room Temperature: 72     Delivery Providers    Delivering Clinician: Lydia Shah, [facility]   Delivery personnel:  Provider Role   Jonathan Muse RN Baby Nurse   Kathy Worrell, RN Delivery Nurse   Jess [facility] Surgical Tech         Cord    Vessels: 3 Vessels  Complications:  Body cord  # of loops: 1  Timed cord clamping: Yes  Time in sec: 120  Cord blood disposition: to lab  Gases sent?: No     Resuscitation    Method: None      Measurements    No data filed     Placenta    Date/time: 2022  Removal: Spontaneous  Appearance: Intact  Disposition: Discarded     Apgars    Living status: Living   Apgar Scoring Key:    0 1 2    Skin color Blue or pale Acrocyanotic Completely pink    Heart rate Absent <100 bpm >100 bpm    Reflex irritability No response Grimace Cry or active withdrawal    Muscle tone Limp Some flexion Active motion    Respiratory effort Absent Weak cry; hypoventilation Good, crying 1 Minute:  5 Minute:  10 Minute:  15 Minute:  20 Minute:    Skin color: 0  1       Heart rate: 2  2       Reflex irritablity: 2  2       Muscle tone: 2  2       Respiratory effort: 2  2       Total: 8  9          Apgars assigned by: Christen Weaver RN  Natoma disposition: with mother     Skin to Skin    Skin to skin initiated date/time: 2022  Skin to skin with: Mother     Vaginal Count    Initial count RN: Roxanna Miranda RN  Initial count Tech: Floyce Lombard   Sponges   Sharps    Initial counts 10   0    Final counts 10   0    Final count RN: Sophia Santillan RN  Final count MD: Jordin Odom CNM     Delivery (Maternal)    Episiotomy: None  Perineal lacerations: None    Vaginal laceration?: No    Cervical laceration?: No    Clitoral laceration?: No    Quantitative blood loss (mL): 35          Patient checked and noted to be complete. Pushed in multiple positions with variable decelerations and some late decelerations with spontaneous recovery and good variability throughout. Vaginal delivery vigorous baby Boy. Apgars 8,9. Body cord noted. Baby restituted OP. Infant skin to skin with mother. Cord clamped and cut after 3 min delay. Pitocin per protocol, placenta intact. Perineum intact. QBL as noted. Mom and baby stable.

## 2022-05-25 NOTE — ANESTHESIA POSTPROCEDURE EVALUATION
Patient: Nikita Mesa    Procedure Summary     Date: 05/24/22 Room / Location:     Anesthesia Start: Merged with [facility] Anesthesia Stop: 2039    Procedure: LABOR ANALGESIA Diagnosis:     Scheduled Providers:  Anesthesiologist: Vania Burr MD    Anesthesia Type: epidural ASA Status: 2          Anesthesia Type: epidural    Vitals Value Taken Time   /72 05/24/22 2216   Temp 98 05/24/22 2222   Pulse 145 05/24/22 2220   Resp 16 05/24/22 2222   SpO2 100 % 05/24/22 2220   Vitals shown include unvalidated device data.     [facility] AN Post Evaluation:   Patient Evaluated in PACU  Patient Participation: complete - patient participated  Level of Consciousness: awake  Pain Management: adequate  Airway Patency:patent  Dental exam unchanged from preop  Yes    Cardiovascular Status: acceptable  Respiratory Status: acceptable  Postoperative Hydration acceptable      Mahesh Coon MD  5/24/2022 10:22 PM

## 2022-05-25 NOTE — PROGRESS NOTES
Patient up to bathroom with assist x 2. Unable to void at this time, bladder scan 262cc. Patient transferred to mother/baby room 356 per wheelchair in stable condition with baby and personal belongings. Accompanied by significant other and staff. Report given to mother/baby RN Narciso Carney.

## 2022-05-25 NOTE — PLAN OF CARE
Problem: Patient Centered Care  Goal: Patient preferences are identified and integrated in the patient's plan of care  Description: Interventions:  - What would you like us to know as we care for you?   - Provide timely, complete, and accurate information to patient/family  - Incorporate patient and family knowledge, values, beliefs, and cultural backgrounds into the planning and delivery of care  - Encourage patient/family to participate in care and decision-making at the level they choose  - Honor patient and family perspectives and choices  Outcome: Progressing     Problem: Patient/Family Goals  Goal: Patient/Family Long Term Goal  Description: Patient's Long Term Goal:     Interventions:  -   - See additional Care Plan goals for specific interventions  Outcome: Progressing  Goal: Patient/Family Short Term Goal  Description: Patient's Short Term Goal:     Interventions:   - - See additional Care Plan goals for specific interventions  Outcome: Progressing     Problem: PAIN - ADULT  Goal: Verbalizes/displays adequate comfort level or patient's stated pain goal  Description: INTERVENTIONS:  - Encourage pt to monitor pain and request assistance  - Assess pain using appropriate pain scale  - Administer analgesics based on type and severity of pain and evaluate response  - Implement non-pharmacological measures as appropriate and evaluate response  - Consider cultural and social influences on pain and pain management  - Manage/alleviate anxiety  - Utilize distraction and/or relaxation techniques  - Monitor for opioid side effects  - Notify MD/LIP if interventions unsuccessful or patient reports new pain  - Anticipate increased pain with activity and pre-medicate as appropriate  Outcome: Progressing     Problem: ANXIETY  Goal: Will report anxiety at manageable levels  Description: INTERVENTIONS:  - Administer medication as ordered  - Teach and rehearse alternative coping skills  - Provide emotional support with 1:1 interaction with staff  Outcome: Progressing     Problem: POSTPARTUM  Goal: Long Term Goal:Experiences normal postpartum course  Description: INTERVENTIONS:  - Assess and monitor vital signs and lab values. - Assess fundus and lochia. - Provide ice/sitz baths for perineum discomfort. - Monitor healing of incision/episiotomy/laceration, and assess for signs and symptoms of infection and hematoma. - Assess bladder function and monitor for bladder distention.  - Provide/instruct/assist with pericare as needed. - Provide VTE prophylaxis as needed. - Monitor bowel function.  - Encourage ambulation and provide assistance as needed. - Assess and monitor emotional status and provide social service/psych resources as needed. - Utilize standard precautions and use personal protective equipment as indicated. Ensure aseptic care of all intravenous lines and invasive tubes/drains.  - Obtain immunization and exposure to communicable diseases history. Outcome: Progressing  Goal: Optimize infant feeding at the breast  Description: INTERVENTIONS:  - Initiate breast feeding within first hour after birth. - Monitor effectiveness of current breast feeding efforts. - Assess support systems available to mother/family.  - Identify cultural beliefs/practices regarding lactation, letdown techniques, maternal food preferences. - Assess mother's knowledge and previous experience with breast feeding.  - Provide information as needed about early infant feeding cues (e.g., rooting, lip smacking, sucking fingers/hand) versus late cue of crying.  - Discuss/demonstrate breast feeding aids (e.g., infant sling, nursing footstool/pillows, and breast pumps). - Encourage mother/other family members to express feelings/concerns, and actively listen. - Educate father/SO about benefits of breast feeding and how to manage common lactation challenges.   - Recommend avoidance of specific medications or substances incompatible with breast feeding.  - Assess and monitor for signs of nipple pain/trauma. - Instruct and provide assistance with proper latch. - Review techniques for milk expression (breast pumping) and storage of breast milk. Provide pumping equipment/supplies, instructions and assistance, as needed. - Encourage rooming-in and breast feeding on demand.  - Encourage skin-to-skin contact. - Provide LC support as needed. - Assess for and manage engorgement. - Provide breast feeding education handouts and information on community breast feeding support. Outcome: Progressing  Goal: Establishment of adequate milk supply with medication/procedure interruptions  Description: INTERVENTIONS:  - Review techniques for milk expression (breast pumping). - Provide pumping equipment/supplies, instructions, and assistance until it is safe to breastfeed infant. Outcome: Progressing  Goal: Experiences normal breast weaning course  Description: INTERVENTIONS:  - Assess for and manage engorgement. - Instruct on breast care. - Provide comfort measures. Outcome: Progressing  Goal: Appropriate maternal -  bonding  Description: INTERVENTIONS:  - Assess caregiver- interactions. - Assess caregiver's emotional status and coping mechanisms. - Encourage caregiver to participate in  daily care. - Assess support systems available to mother/family.  - Provide /case management support as needed.   Outcome: Progressing

## 2022-05-26 VITALS
DIASTOLIC BLOOD PRESSURE: 69 MMHG | SYSTOLIC BLOOD PRESSURE: 120 MMHG | TEMPERATURE: 99 F | OXYGEN SATURATION: 100 % | HEART RATE: 67 BPM | RESPIRATION RATE: 16 BRPM

## 2022-05-26 RX ORDER — MELATONIN
325 EVERY OTHER DAY
Qty: 60 TABLET | Refills: 1 | Status: SHIPPED | OUTPATIENT
Start: 2022-05-26

## 2022-05-26 NOTE — PLAN OF CARE
Problem: Patient Centered Care  Goal: Patient preferences are identified and integrated in the patient's plan of care  Description: Interventions:  - What would you like us to know as we care for you? First baby   - Provide timely, complete, and accurate information to patient/family  - Incorporate patient and family knowledge, values, beliefs, and cultural backgrounds into the planning and delivery of care  - Encourage patient/family to participate in care and decision-making at the level they choose  - Honor patient and family perspectives and choices  Outcome: Adequate for Discharge     Problem: Patient/Family Goals  Goal: Patient/Family Long Term Goal  Description: Patient's Long Term Goal: to go home with baby    Interventions:  - See additional Care Plan goals for specific interventions  Outcome: Adequate for Discharge  Goal: Patient/Family Short Term Goal  Description: Patient's Short Term Goal: to go home    Interventions:   - See additional Care Plan goals for specific interventions  Outcome: Adequate for Discharge     Problem: POSTPARTUM  Goal: Long Term Goal:Experiences normal postpartum course  Description: INTERVENTIONS:  - Assess and monitor vital signs and lab values. - Assess fundus and lochia. - Provide ice/sitz baths for perineum discomfort. - Monitor healing of incision/episiotomy/laceration, and assess for signs and symptoms of infection and hematoma. - Assess bladder function and monitor for bladder distention.  - Provide/instruct/assist with pericare as needed. - Provide VTE prophylaxis as needed. - Monitor bowel function.  - Encourage ambulation and provide assistance as needed. - Assess and monitor emotional status and provide social service/psych resources as needed. - Utilize standard precautions and use personal protective equipment as indicated.  Ensure aseptic care of all intravenous lines and invasive tubes/drains.  - Obtain immunization and exposure to communicable diseases history. Outcome: Adequate for Discharge  Goal: Optimize infant feeding at the breast  Description: INTERVENTIONS:  - Initiate breast feeding within first hour after birth. - Monitor effectiveness of current breast feeding efforts. - Assess support systems available to mother/family.  - Identify cultural beliefs/practices regarding lactation, letdown techniques, maternal food preferences. - Assess mother's knowledge and previous experience with breast feeding.  - Provide information as needed about early infant feeding cues (e.g., rooting, lip smacking, sucking fingers/hand) versus late cue of crying.  - Discuss/demonstrate breast feeding aids (e.g., infant sling, nursing footstool/pillows, and breast pumps). - Encourage mother/other family members to express feelings/concerns, and actively listen. - Educate father/SO about benefits of breast feeding and how to manage common lactation challenges. - Recommend avoidance of specific medications or substances incompatible with breast feeding.  - Assess and monitor for signs of nipple pain/trauma. - Instruct and provide assistance with proper latch. - Review techniques for milk expression (breast pumping) and storage of breast milk. Provide pumping equipment/supplies, instructions and assistance, as needed. - Encourage rooming-in and breast feeding on demand.  - Encourage skin-to-skin contact. - Provide LC support as needed. - Assess for and manage engorgement. - Provide breast feeding education handouts and information on community breast feeding support. Outcome: Adequate for Discharge  Goal: Establishment of adequate milk supply with medication/procedure interruptions  Description: INTERVENTIONS:  - Review techniques for milk expression (breast pumping). - Provide pumping equipment/supplies, instructions, and assistance until it is safe to breastfeed infant.   Outcome: Adequate for Discharge  Goal: Experiences normal breast weaning course  Description: INTERVENTIONS:  - Assess for and manage engorgement. - Instruct on breast care. - Provide comfort measures. Outcome: Adequate for Discharge  Goal: Appropriate maternal -  bonding  Description: INTERVENTIONS:  - Assess caregiver- interactions. - Assess caregiver's emotional status and coping mechanisms. - Encourage caregiver to participate in  daily care. - Assess support systems available to mother/family.  - Provide /case management support as needed. Outcome: Adequate for Discharge   Requesting discharge today. Up adlib. Voiding and stooling. Bonding well with baby.  Support of spouse present

## 2022-05-26 NOTE — LACTATION NOTE
LACTATION NOTE - MOTHER           Problems identified  Problems identified: Knowledge deficit         Breastfeeding goal  Breastfeeding goal: To maintain breast milk feeding per patient goal    Maternal Assessment  Bilateral Breasts: Soft;Symmetrical  Bilateral Nipples: Everted;WNL  Breastfeeding Assistance: Breastfeeding assistance provided with permission    Pain assessment  Location/Comment: nipples  Pain scale comment: denies  Treatment of Sore Nipples: Coconut oil; Lanolin;Deeper latch techniques; Expressed breast milk    Guidelines for use of:  Current use of pump[de-identified] None  Other (comment): Upon entering room mom is actively breastfeeding on right breast in a cradle hold. Deep latch observed & denies pain. Reinforced  behaviors, gentle wake techniques, signs of adequate lactation I&O, stages of milk production, supply & demand, frequency, when to expect milk volume increases,hand expressing, pumping schedule / routine, breast massage, shallow vs deep latch techniques, establishing / increasing & maintain milk supply. Support & encouragement given; enc mom to call with next feeding and prn. Board in room updated with call #.

## 2022-05-26 NOTE — PLAN OF CARE
Discharged home in wheel chair with spouse and baby. AVS and discharge instructions provided.  [address] understanding and follow up is arranged

## 2022-05-26 NOTE — PLAN OF CARE
Sat with patient to review plan of care. Discussed analgesic options and answered all questions. VSS. Breastfeeding on demand. Breastfeeding successfully. Voiding independently. Lochia is WNL. Uterus is firm.     Problem: Patient Centered Care  Goal: Patient preferences are identified and integrated in the patient's plan of care  Description: Interventions:  - What would you like us to know as we care for you?   - Provide timely, complete, and accurate information to patient/family  - Incorporate patient and family knowledge, values, beliefs, and cultural backgrounds into the planning and delivery of care  - Encourage patient/family to participate in care and decision-making at the level they choose  - Honor patient and family perspectives and choices  Outcome: Progressing     Problem: Patient/Family Goals  Goal: Patient/Family Long Term Goal  Description: Patient's Long Term Goal:    Interventions:    - See additional Care Plan goals for specific interventions  Outcome: Progressing  Goal: Patient/Family Short Term Goal  Description: Patient's Short Term Goal:     Interventions:     - See additional Care Plan goals for specific interventions  Outcome: Progressing     Problem: PAIN - ADULT  Goal: Verbalizes/displays adequate comfort level or patient's stated pain goal  Description: INTERVENTIONS:  - Encourage pt to monitor pain and request assistance  - Assess pain using appropriate pain scale  - Administer analgesics based on type and severity of pain and evaluate response  - Implement non-pharmacological measures as appropriate and evaluate response  - Consider cultural and social influences on pain and pain management  - Manage/alleviate anxiety  - Utilize distraction and/or relaxation techniques  - Monitor for opioid side effects  - Notify MD/LIP if interventions unsuccessful or patient reports new pain  - Anticipate increased pain with activity and pre-medicate as appropriate  Outcome: Progressing     Problem: ANXIETY  Goal: Will report anxiety at manageable levels  Description: INTERVENTIONS:  - Administer medication as ordered  - Teach and rehearse alternative coping skills  - Provide emotional support with 1:1 interaction with staff  Outcome: Progressing     Problem: POSTPARTUM  Goal: Long Term Goal:Experiences normal postpartum course  Description: INTERVENTIONS:  - Assess and monitor vital signs and lab values. - Assess fundus and lochia. - Provide ice/sitz baths for perineum discomfort. - Monitor healing of incision/episiotomy/laceration, and assess for signs and symptoms of infection and hematoma. - Assess bladder function and monitor for bladder distention.  - Provide/instruct/assist with pericare as needed. - Provide VTE prophylaxis as needed. - Monitor bowel function.  - Encourage ambulation and provide assistance as needed. - Assess and monitor emotional status and provide social service/psych resources as needed. - Utilize standard precautions and use personal protective equipment as indicated. Ensure aseptic care of all intravenous lines and invasive tubes/drains.  - Obtain immunization and exposure to communicable diseases history. Outcome: Progressing  Goal: Optimize infant feeding at the breast  Description: INTERVENTIONS:  - Initiate breast feeding within first hour after birth. - Monitor effectiveness of current breast feeding efforts. - Assess support systems available to mother/family.  - Identify cultural beliefs/practices regarding lactation, letdown techniques, maternal food preferences. - Assess mother's knowledge and previous experience with breast feeding.  - Provide information as needed about early infant feeding cues (e.g., rooting, lip smacking, sucking fingers/hand) versus late cue of crying.  - Discuss/demonstrate breast feeding aids (e.g., infant sling, nursing footstool/pillows, and breast pumps).   - Encourage mother/other family members to express feelings/concerns, and actively listen. - Educate father/SO about benefits of breast feeding and how to manage common lactation challenges. - Recommend avoidance of specific medications or substances incompatible with breast feeding.  - Assess and monitor for signs of nipple pain/trauma. - Instruct and provide assistance with proper latch. - Review techniques for milk expression (breast pumping) and storage of breast milk. Provide pumping equipment/supplies, instructions and assistance, as needed. - Encourage rooming-in and breast feeding on demand.  - Encourage skin-to-skin contact. - Provide LC support as needed. - Assess for and manage engorgement. - Provide breast feeding education handouts and information on community breast feeding support. Outcome: Progressing  Goal: Establishment of adequate milk supply with medication/procedure interruptions  Description: INTERVENTIONS:  - Review techniques for milk expression (breast pumping). - Provide pumping equipment/supplies, instructions, and assistance until it is safe to breastfeed infant. Outcome: Progressing  Goal: Experiences normal breast weaning course  Description: INTERVENTIONS:  - Assess for and manage engorgement. - Instruct on breast care. - Provide comfort measures. Outcome: Progressing  Goal: Appropriate maternal -  bonding  Description: INTERVENTIONS:  - Assess caregiver- interactions. - Assess caregiver's emotional status and coping mechanisms. - Encourage caregiver to participate in  daily care. - Assess support systems available to mother/family.  - Provide /case management support as needed.   Outcome: Progressing

## 2022-05-26 NOTE — DISCHARGE SUMMARY
[facility] - [facility]    Discharge Summary    [address] Debbie [address]alba Patient Status:  Inpatient    10/5/1990 MRN [id number]   Location [facility] 3SE Attending Marcella Dove CNM   Hosp Day # 2       Delivering OB Clinician: Ken Storey    [facility]: Estimated Date of Delivery: 22    Gestational Age: 39w0d    Antepartum complications: Patient Active Problem List:     Leg pain, posterior, left     Chronic left-sided lumbar radiculopathy     Female infertility     Major depressive disorder     Nausea and vomiting during pregnancy     Intramural leiomyoma of uterus     Right upper quadrant pain     Low weight gain during pregnancy, antepartum     Pregnancy     Asthma      (spontaneous vaginal delivery)      Date of Delivery: 2022 Time of Delivery: 8:31 PM    Delivery Type: spontaneous vaginal delivery    Baby: Liveborn male Information for the patient's : Dejon Mesa [[id number]]   7 lb 4.1 oz (3.29 kg)  Apgars:  1 minute: 8  5 minutes: 910 minutes:       Intrapartum Complications: None    Admit Date: 2022    Discharge Date: 2022    Hospital Course: No complications.  Routine delivery and postpartum care    Discharged Condition: stable    Disposition: home    Plan:     Follow-up appointment in 3 days for blood pressure check with RN and then in 2 weeks with [address], CNM  2022  11:16 AM

## 2022-05-26 NOTE — LACTATION NOTE
This note was copied from a baby's chart. LACTATION NOTE - INFANT    Evaluation Type  Evaluation Type: Inpatient    Problems & Assessment  Infant Assessment: Skin color: pink or appropriate for ethnicity; Anterior fontanel soft and flat  Muscle tone: Appropriate for GA    Feeding Assessment  Summary Current Feeding: Adlib;Breastfeeding exclusively  Breastfeeding Assessment: Coordinated suck/swallow;Calm and ready to breastfeed;Deep latch achieved and observed;Sustained nutrititive latch w/audible swallows  Breastfeeding Positions: cradle;left breast (shortly after entering room baby unlatch & assisted mom to latch baby to left breast-deep latch achieved, minimal assist needed with positioning & support.)  Latch: Grasps breast, tongue down, lips flanged, rhythmic sucking  Audible Sucks/Swallows: Spontaneous and intermittent (24 hours old)  Type of Nipple: Everted (after stimulation)  Comfort (Breast/Nipple): Soft/non-tender  Hold (Positioning): Full assist, teach one side, mother does other, staff holds  [facility] Score: 9  Other (comment): shortly after entering room baby unlatch from right breast- assisted mom to latch baby to left breast-deep latch achieved and mom denies pain, coordinated positive jaw movement noted, minimal assist needed with positioning & support.

## 2022-06-08 ENCOUNTER — TELEMEDICINE (OUTPATIENT)
Dept: OBGYN CLINIC | Facility: CLINIC | Age: 32
End: 2022-06-08
Payer: COMMERCIAL

## 2022-06-08 DIAGNOSIS — Z13.31 DEPRESSION SCREENING: ICD-10-CM

## 2022-06-08 DIAGNOSIS — Z30.8 ENCOUNTER FOR OTHER CONTRACEPTIVE MANAGEMENT: Primary | ICD-10-CM

## 2022-06-09 ENCOUNTER — TELEPHONE (OUTPATIENT)
Dept: OBGYN UNIT | Facility: HOSPITAL | Age: 32
End: 2022-06-09

## 2022-07-07 ENCOUNTER — POSTPARTUM (OUTPATIENT)
Dept: OBGYN CLINIC | Facility: CLINIC | Age: 32
End: 2022-07-07
Payer: COMMERCIAL

## 2022-07-07 VITALS
DIASTOLIC BLOOD PRESSURE: 80 MMHG | WEIGHT: 166 LBS | SYSTOLIC BLOOD PRESSURE: 120 MMHG | BODY MASS INDEX: 25 KG/M2 | HEART RATE: 67 BPM

## 2022-07-07 PROBLEM — R10.11 RIGHT UPPER QUADRANT PAIN: Status: RESOLVED | Noted: 2022-03-20 | Resolved: 2022-07-07

## 2022-07-07 PROCEDURE — 3074F SYST BP LT 130 MM HG: CPT | Performed by: ADVANCED PRACTICE MIDWIFE

## 2022-07-07 PROCEDURE — 3079F DIAST BP 80-89 MM HG: CPT | Performed by: ADVANCED PRACTICE MIDWIFE

## 2022-07-28 ENCOUNTER — OFFICE VISIT (OUTPATIENT)
Dept: FAMILY MEDICINE CLINIC | Facility: CLINIC | Age: 32
End: 2022-07-28
Payer: COMMERCIAL

## 2022-07-28 VITALS
BODY MASS INDEX: 24.88 KG/M2 | OXYGEN SATURATION: 99 % | HEART RATE: 56 BPM | HEIGHT: 69 IN | WEIGHT: 168 LBS | DIASTOLIC BLOOD PRESSURE: 76 MMHG | RESPIRATION RATE: 18 BRPM | SYSTOLIC BLOOD PRESSURE: 108 MMHG

## 2022-07-28 DIAGNOSIS — K21.00 GASTROESOPHAGEAL REFLUX DISEASE WITH ESOPHAGITIS WITHOUT HEMORRHAGE: Primary | ICD-10-CM

## 2022-07-28 PROCEDURE — 3078F DIAST BP <80 MM HG: CPT | Performed by: FAMILY MEDICINE

## 2022-07-28 PROCEDURE — 3074F SYST BP LT 130 MM HG: CPT | Performed by: FAMILY MEDICINE

## 2022-07-28 PROCEDURE — 99212 OFFICE O/P EST SF 10 MIN: CPT | Performed by: FAMILY MEDICINE

## 2022-07-28 PROCEDURE — 3008F BODY MASS INDEX DOCD: CPT | Performed by: FAMILY MEDICINE

## 2022-08-16 ENCOUNTER — OFFICE VISIT (OUTPATIENT)
Dept: OBGYN CLINIC | Facility: CLINIC | Age: 32
End: 2022-08-16
Payer: COMMERCIAL

## 2022-08-16 VITALS
SYSTOLIC BLOOD PRESSURE: 112 MMHG | WEIGHT: 171 LBS | BODY MASS INDEX: 25.33 KG/M2 | HEART RATE: 67 BPM | DIASTOLIC BLOOD PRESSURE: 73 MMHG | HEIGHT: 69 IN

## 2022-08-16 DIAGNOSIS — Z01.419 WOMEN'S ANNUAL ROUTINE GYNECOLOGICAL EXAMINATION: Primary | ICD-10-CM

## 2022-08-16 PROCEDURE — 3074F SYST BP LT 130 MM HG: CPT | Performed by: ADVANCED PRACTICE MIDWIFE

## 2022-08-16 PROCEDURE — 3008F BODY MASS INDEX DOCD: CPT | Performed by: ADVANCED PRACTICE MIDWIFE

## 2022-08-16 PROCEDURE — 3078F DIAST BP <80 MM HG: CPT | Performed by: ADVANCED PRACTICE MIDWIFE

## 2022-08-16 PROCEDURE — 99395 PREV VISIT EST AGE 18-39: CPT | Performed by: ADVANCED PRACTICE MIDWIFE

## 2022-08-17 LAB — HPV I/H RISK 1 DNA SPEC QL NAA+PROBE: NEGATIVE

## 2022-11-20 PROBLEM — Z78.9 DAILY CONSUMPTION OF ALCOHOL: Status: ACTIVE | Noted: 2022-11-20

## 2022-11-20 PROBLEM — F33.1 MODERATE EPISODE OF RECURRENT MAJOR DEPRESSIVE DISORDER (HCC): Status: ACTIVE | Noted: 2022-11-20

## 2022-11-20 PROBLEM — F41.9 ANXIETY DISORDER: Status: ACTIVE | Noted: 2022-11-20

## 2022-12-15 PROBLEM — F33.41 RECURRENT MAJOR DEPRESSION IN PARTIAL REMISSION (HCC): Status: ACTIVE | Noted: 2022-12-15

## 2023-04-21 ENCOUNTER — OFFICE VISIT (OUTPATIENT)
Dept: OBGYN CLINIC | Facility: CLINIC | Age: 33
End: 2023-04-21

## 2023-04-21 VITALS
HEART RATE: 76 BPM | TEMPERATURE: 98 F | DIASTOLIC BLOOD PRESSURE: 86 MMHG | BODY MASS INDEX: 25.33 KG/M2 | SYSTOLIC BLOOD PRESSURE: 126 MMHG | WEIGHT: 171 LBS | HEIGHT: 69 IN

## 2023-04-21 DIAGNOSIS — R39.9 UTI SYMPTOMS: Primary | ICD-10-CM

## 2023-04-21 LAB
APPEARANCE: CLEAR
BILIRUBIN: NEGATIVE
GLUCOSE (URINE DIPSTICK): NEGATIVE MG/DL
KETONES (URINE DIPSTICK): NEGATIVE MG/DL
LEUKOCYTES: NEGATIVE
MULTISTIX LOT#: NORMAL NUMERIC
NITRITE, URINE: NEGATIVE
OCCULT BLOOD: NEGATIVE
PH, URINE: 6 (ref 4.5–8)
PROTEIN (URINE DIPSTICK): NEGATIVE MG/DL
SPECIFIC GRAVITY: 1.02 (ref 1–1.03)
URINE-COLOR: YELLOW
UROBILINOGEN,SEMI-QN: 0.2 MG/DL (ref 0–1.9)

## 2023-04-21 PROCEDURE — 99213 OFFICE O/P EST LOW 20 MIN: CPT | Performed by: ADVANCED PRACTICE MIDWIFE

## 2023-04-21 PROCEDURE — 81002 URINALYSIS NONAUTO W/O SCOPE: CPT | Performed by: ADVANCED PRACTICE MIDWIFE

## 2023-04-21 PROCEDURE — 3008F BODY MASS INDEX DOCD: CPT | Performed by: ADVANCED PRACTICE MIDWIFE

## 2023-04-21 PROCEDURE — 3074F SYST BP LT 130 MM HG: CPT | Performed by: ADVANCED PRACTICE MIDWIFE

## 2023-04-21 PROCEDURE — 3079F DIAST BP 80-89 MM HG: CPT | Performed by: ADVANCED PRACTICE MIDWIFE

## 2023-05-31 ENCOUNTER — OFFICE VISIT (OUTPATIENT)
Dept: FAMILY MEDICINE CLINIC | Facility: CLINIC | Age: 33
End: 2023-05-31

## 2023-05-31 ENCOUNTER — NURSE TRIAGE (OUTPATIENT)
Dept: FAMILY MEDICINE CLINIC | Facility: CLINIC | Age: 33
End: 2023-05-31

## 2023-05-31 VITALS
WEIGHT: 173 LBS | SYSTOLIC BLOOD PRESSURE: 129 MMHG | HEART RATE: 90 BPM | BODY MASS INDEX: 26 KG/M2 | DIASTOLIC BLOOD PRESSURE: 80 MMHG | TEMPERATURE: 98 F

## 2023-05-31 DIAGNOSIS — J02.9 SORE THROAT: ICD-10-CM

## 2023-05-31 DIAGNOSIS — J02.0 STREP PHARYNGITIS: Primary | ICD-10-CM

## 2023-05-31 LAB
CONTROL LINE PRESENT WITH A CLEAR BACKGROUND (YES/NO): YES YES/NO
COVID19 BINAX NOW ANTIGEN: NOT DETECTED
KIT LOT #: ABNORMAL NUMERIC
POCT LOT NUMBER: NORMAL
STREP GRP A CUL-SCR: POSITIVE

## 2023-05-31 PROCEDURE — 87880 STREP A ASSAY W/OPTIC: CPT | Performed by: FAMILY MEDICINE

## 2023-05-31 PROCEDURE — 99213 OFFICE O/P EST LOW 20 MIN: CPT | Performed by: FAMILY MEDICINE

## 2023-05-31 PROCEDURE — 3079F DIAST BP 80-89 MM HG: CPT | Performed by: FAMILY MEDICINE

## 2023-05-31 PROCEDURE — 3074F SYST BP LT 130 MM HG: CPT | Performed by: FAMILY MEDICINE

## 2023-05-31 RX ORDER — AMOXICILLIN 500 MG/1
500 CAPSULE ORAL 3 TIMES DAILY
Qty: 30 CAPSULE | Refills: 0 | Status: SHIPPED | OUTPATIENT
Start: 2023-05-31 | End: 2023-06-10

## 2023-05-31 NOTE — TELEPHONE ENCOUNTER
Action Requested: Summary for Provider     []  Critical Lab, Recommendations Needed  [] Need Additional Advice  []   FYI    []   Need Orders  [] Need Medications Sent to Pharmacy  []  Other     SUMMARY: Per protocol advised : Office visit   Future Appointments   Date Time Provider Elias Terrell   2023  1:20 PM Anju Heaton MD [address] [place] [address]              Reason for call: Sore Throat  Onset: Data Unavailable    Patient calling ( identified name and  ) states her child is Streph + and now she has sore throat     Symptoms of sore throat X 1 day  Able to eat, drink and swallow but with pain      Denies fever, no white patches  in throat noted     See care advice given     Patient verbalizes understanding and agrees with plan.      Reason for Disposition   SEVERE sore throat pain    Protocols used: Sore Throat-A-OH

## 2023-09-08 PROBLEM — F19.981 DRUG-INDUCED SEXUAL DYSFUNCTION (HCC): Status: ACTIVE | Noted: 2023-09-08

## 2023-09-12 ENCOUNTER — OFFICE VISIT (OUTPATIENT)
Dept: FAMILY MEDICINE CLINIC | Facility: CLINIC | Age: 33
End: 2023-09-12

## 2023-09-12 VITALS
BODY MASS INDEX: 26 KG/M2 | WEIGHT: 176 LBS | DIASTOLIC BLOOD PRESSURE: 83 MMHG | SYSTOLIC BLOOD PRESSURE: 124 MMHG | HEART RATE: 80 BPM

## 2023-09-12 DIAGNOSIS — M25.532 LEFT WRIST PAIN: Primary | ICD-10-CM

## 2023-09-12 PROCEDURE — 3074F SYST BP LT 130 MM HG: CPT | Performed by: FAMILY MEDICINE

## 2023-09-12 PROCEDURE — 3079F DIAST BP 80-89 MM HG: CPT | Performed by: FAMILY MEDICINE

## 2023-09-12 PROCEDURE — 99212 OFFICE O/P EST SF 10 MIN: CPT | Performed by: FAMILY MEDICINE

## 2024-03-05 ENCOUNTER — OFFICE VISIT (OUTPATIENT)
Dept: OBGYN CLINIC | Facility: CLINIC | Age: 34
End: 2024-03-05
Payer: COMMERCIAL

## 2024-03-05 VITALS
SYSTOLIC BLOOD PRESSURE: 122 MMHG | HEART RATE: 96 BPM | HEIGHT: 69 IN | DIASTOLIC BLOOD PRESSURE: 85 MMHG | BODY MASS INDEX: 26.51 KG/M2 | WEIGHT: 179 LBS

## 2024-03-05 DIAGNOSIS — O21.9 NAUSEA AND VOMITING IN PREGNANCY (HCC): ICD-10-CM

## 2024-03-05 DIAGNOSIS — Z32.00 PREGNANCY EXAMINATION OR TEST, PREGNANCY UNCONFIRMED: ICD-10-CM

## 2024-03-05 DIAGNOSIS — N92.6 MISSED MENSES: Primary | ICD-10-CM

## 2024-03-05 LAB
CONTROL LINE PRESENT WITH A CLEAR BACKGROUND (YES/NO): YES YES/NO
KIT LOT #: NORMAL NUMERIC
PREGNANCY TEST, URINE: POSITIVE

## 2024-03-05 PROCEDURE — 99214 OFFICE O/P EST MOD 30 MIN: CPT | Performed by: ADVANCED PRACTICE MIDWIFE

## 2024-03-05 PROCEDURE — 81025 URINE PREGNANCY TEST: CPT | Performed by: ADVANCED PRACTICE MIDWIFE

## 2024-03-05 RX ORDER — ONDANSETRON 4 MG/1
4 TABLET, FILM COATED ORAL EVERY 8 HOURS PRN
Qty: 30 TABLET | Refills: 0 | Status: SHIPPED | OUTPATIENT
Start: 2024-03-05

## 2024-03-05 NOTE — PROGRESS NOTES
Subjective:   Patient ID: Corinne Denise Macnichol is a 33 year old female who presents for her missed menses visit.  Las menses was 1/19 6w4d  Denies any spotting, bleeding or cramping  + nausea taking Unisom and B6 would like an rx for Zofran  Taking folate.    HPI    History/Other:   Review of Systems   Constitutional: Negative.    Respiratory: Negative.     Cardiovascular: Negative.    Endocrine: Negative.    Genitourinary: Negative.    Psychiatric/Behavioral: Negative.       Current Outpatient Medications   Medication Sig Dispense Refill    ondansetron (ZOFRAN) 4 mg tablet Take 1 tablet (4 mg total) by mouth every 8 (eight) hours as needed for Nausea. 30 tablet 0    [START ON 4/1/2024] sertraline (ZOLOFT) 50 MG Oral Tab Take 1 tablet (50 mg total) by mouth every morning. 90 tablet 1    buPROPion ER (WELLBUTRIN XL) 150 MG Oral Tablet 24 Hr Take 1 tablet (150 mg total) by mouth nightly. 90 tablet 1    sertraline 50 MG Oral Tab Take 1 tablet (50 mg total) by mouth daily. 90 tablet 1     Allergies:No Known Allergies    Objective:   Physical Exam  Vitals reviewed.   Constitutional:       General: She is not in acute distress.     Appearance: Normal appearance. She is not ill-appearing, toxic-appearing or diaphoretic.   Cardiovascular:      Rate and Rhythm: Normal rate.   Pulmonary:      Effort: Pulmonary effort is normal.   Skin:     General: Skin is warm and dry.   Neurological:      Mental Status: She is alert and oriented to person, place, and time.   Pelvic deferred  Assessment & Plan:   1. Missed menses    2. Pregnancy examination or test, pregnancy unconfirmed    3. Nausea and vomiting in pregnancy (HCC)    1.  Discussed importance of folic acid, calcium, vitamin D.   2.  Reviewed pregnancy recommendations regarding weight gain, diet, fish consumption, consumption of deli-meats and hot dogs, making sure food is appropriately cooked and washed to avoid food-borne illnesses.    3.  Travel discussed, avoid travel  to Covid zones, use of support stockings with flights longer than 3 hours, movement every 2-3 hours if driving  4.  Discussed exercise, avoid jacuzzi, sauna, hot tubs and steam rooms.   5.  Discussed avoidance of alcohol, smoking, and minimizing caffeine.    6.  Warning signs reviewed advised to call office if occur.    7.  First Trimester chromosomal screening Cell free Fetal DNA and US schedule discussed.  Desires First trimester screen testing  8. Genetic screening and ACOG/ ACMG guidelines for CF, SMA, Fragile X & hemoglobinopathies.  Informrd our offices uses the Preparent Standard Panel but other sources are available.  If interested they should check their insurance and various costs  9. ONTD risks discussed. no risk factors identified. Taking 4000 mcg Folic acid.   10. Pre- Eclampsia Risk Assessment: Low risk  11. First trimester ultrasound scheduled for Friday  12.  Schedule RN OB ED visit   13.  30 minutes face to face counseling, chart review, orders and coordination of care     Orders Placed This Encounter   Procedures    POC Urine pregnancy test [69745]       Meds This Visit:  Requested Prescriptions     Signed Prescriptions Disp Refills    ondansetron (ZOFRAN) 4 mg tablet 30 tablet 0     Sig: Take 1 tablet (4 mg total) by mouth every 8 (eight) hours as needed for Nausea.       Imaging & Referrals:  None

## 2024-03-08 ENCOUNTER — HOSPITAL ENCOUNTER (OUTPATIENT)
Dept: ULTRASOUND IMAGING | Age: 34
Discharge: HOME | End: 2024-03-08
Attending: ADVANCED PRACTICE MIDWIFE
Payer: COMMERCIAL

## 2024-03-08 DIAGNOSIS — N92.6 MISSED MENSES: ICD-10-CM

## 2024-03-08 PROCEDURE — 76801 OB US < 14 WKS SINGLE FETUS: CPT | Performed by: ADVANCED PRACTICE MIDWIFE

## 2024-03-08 PROCEDURE — 76817 TRANSVAGINAL US OBSTETRIC: CPT | Performed by: ADVANCED PRACTICE MIDWIFE

## 2024-03-14 ENCOUNTER — TELEPHONE (OUTPATIENT)
Dept: OBGYN CLINIC | Facility: CLINIC | Age: 34
End: 2024-03-14

## 2024-03-14 NOTE — TELEPHONE ENCOUNTER
Patient verified name and     Informed patient of results and recommendations. Patient scheduled RN ED for 3/21. Aware of scheduling details.    ----- Message from Amaya Mary CNM sent at 3/14/2024 12:41 PM CDT -----  Please call:  CONCLUSION:   1. Single live intrauterine gestation with an estimated sonographic gestational age of 7 weeks 4 days.   2. JESUS = 10/21/2024.     Needs RN ed visit

## 2024-03-21 ENCOUNTER — NURSE ONLY (OUTPATIENT)
Dept: OBGYN CLINIC | Facility: CLINIC | Age: 34
End: 2024-03-21
Payer: COMMERCIAL

## 2024-03-21 DIAGNOSIS — Z34.81 ENCOUNTER FOR SUPERVISION OF OTHER NORMAL PREGNANCY IN FIRST TRIMESTER (HCC): Primary | ICD-10-CM

## 2024-03-21 DIAGNOSIS — O09.521 ADVANCED MATERNAL AGE IN MULTIGRAVIDA, FIRST TRIMESTER (HCC): ICD-10-CM

## 2024-03-21 RX ORDER — FOLIC ACID 1 MG/1
1 TABLET ORAL DAILY
COMMUNITY

## 2024-03-21 NOTE — PROGRESS NOTES
Phone Nurse Education Completed  PT verbalized understanding     Orders placed for new OB labs, TSH      Abnormal Pap- No  Last Pap-22  Genetic Testing- Desires 1st trimester screen   Carrier Screen- Completed already  Circumcision- Declines   Feeding- Breast  Emergency Transfusion- Agreeable   EPDS-4  Type of birth- Desires Epidural   How [place] [facility]- Current patient     Consults Placed- [facility] for first trimester screen.      Pt. Has answered NO 5P questions and has NO  risk factors.      Pt. Given What pregnant women need to know handout.        Patient verbalized understanding that [employer] messaging is to be used for non-urgent medical questions, and to call the office with any vaginal bleeding, leaking of fluid, cramping, decreased fetal movement, or any other urgent medical question.

## 2024-03-29 ENCOUNTER — TELEMEDICINE (OUTPATIENT)
Dept: OBGYN CLINIC | Facility: CLINIC | Age: 34
End: 2024-03-29
Payer: COMMERCIAL

## 2024-03-29 ENCOUNTER — HOSPITAL ENCOUNTER (OUTPATIENT)
Age: 34
Discharge: HOME | End: 2024-03-29
Payer: COMMERCIAL

## 2024-03-29 ENCOUNTER — TELEPHONE (OUTPATIENT)
Dept: OBGYN CLINIC | Facility: CLINIC | Age: 34
End: 2024-03-29

## 2024-03-29 VITALS
SYSTOLIC BLOOD PRESSURE: 126 MMHG | HEART RATE: 106 BPM | TEMPERATURE: 98 F | RESPIRATION RATE: 20 BRPM | BODY MASS INDEX: 25.92 KG/M2 | HEIGHT: 69 IN | OXYGEN SATURATION: 100 % | WEIGHT: 175 LBS | DIASTOLIC BLOOD PRESSURE: 83 MMHG

## 2024-03-29 DIAGNOSIS — E87.6 HYPOKALEMIA: ICD-10-CM

## 2024-03-29 DIAGNOSIS — O21.9 NAUSEA/VOMITING IN PREGNANCY (HCC): Primary | ICD-10-CM

## 2024-03-29 DIAGNOSIS — O21.0 HYPEREMESIS GRAVIDARUM (HCC): Primary | ICD-10-CM

## 2024-03-29 PROBLEM — Z78.9 DAILY CONSUMPTION OF ALCOHOL: Status: RESOLVED | Noted: 2022-11-20 | Resolved: 2024-03-29

## 2024-03-29 LAB
#MXD IC: 0.5 X10ˆ3/UL (ref 0.1–1)
BUN BLD-MCNC: 5 MG/DL (ref 7–18)
CHLORIDE BLD-SCNC: 104 MMOL/L (ref 98–112)
CO2 BLD-SCNC: 22 MMOL/L (ref 21–32)
CREAT BLD-MCNC: 0.5 MG/DL
EGFRCR SERPLBLD CKD-EPI 2021: 126 ML/MIN/1.73M2 (ref 60–?)
GLUCOSE BLD-MCNC: 108 MG/DL (ref 70–99)
HCT VFR BLD AUTO: 41.3 %
HCT VFR BLD CALC: 44 %
HGB BLD-MCNC: 13.7 G/DL
ISTAT IONIZED CALCIUM FOR CHEM 8: 1.25 MMOL/L (ref 1.12–1.32)
LYMPHOCYTES # BLD AUTO: 1.9 X10ˆ3/UL (ref 1–4)
LYMPHOCYTES NFR BLD AUTO: 21 %
MCH RBC QN AUTO: 25.8 PG (ref 26–34)
MCHC RBC AUTO-ENTMCNC: 33.2 G/DL (ref 31–37)
MCV RBC AUTO: 77.9 FL (ref 80–100)
MIXED CELL %: 5 %
NEUTROPHILS # BLD AUTO: 6.8 X10ˆ3/UL (ref 1.5–7.7)
NEUTROPHILS NFR BLD AUTO: 74 %
PLATELET # BLD AUTO: 327 X10ˆ3/UL (ref 150–450)
POTASSIUM BLD-SCNC: 3.4 MMOL/L (ref 3.6–5.1)
RBC # BLD AUTO: 5.3 X10ˆ6/UL
SODIUM BLD-SCNC: 139 MMOL/L (ref 136–145)
WBC # BLD AUTO: 9.2 X10ˆ3/UL (ref 4–11)

## 2024-03-29 PROCEDURE — 96374 THER/PROPH/DIAG INJ IV PUSH: CPT | Performed by: PHYSICIAN ASSISTANT

## 2024-03-29 PROCEDURE — 80047 BASIC METABLC PNL IONIZED CA: CPT | Performed by: PHYSICIAN ASSISTANT

## 2024-03-29 PROCEDURE — 96361 HYDRATE IV INFUSION ADD-ON: CPT | Performed by: PHYSICIAN ASSISTANT

## 2024-03-29 PROCEDURE — S0028 INJECTION, FAMOTIDINE, 20 MG: HCPCS | Performed by: PHYSICIAN ASSISTANT

## 2024-03-29 PROCEDURE — 99213 OFFICE O/P EST LOW 20 MIN: CPT | Performed by: PHYSICIAN ASSISTANT

## 2024-03-29 PROCEDURE — 85025 COMPLETE CBC W/AUTO DIFF WBC: CPT | Performed by: PHYSICIAN ASSISTANT

## 2024-03-29 PROCEDURE — 96375 TX/PRO/DX INJ NEW DRUG ADDON: CPT | Performed by: PHYSICIAN ASSISTANT

## 2024-03-29 RX ORDER — ONDANSETRON 2 MG/ML
4 INJECTION INTRAMUSCULAR; INTRAVENOUS ONCE
Status: COMPLETED | OUTPATIENT
Start: 2024-03-29 | End: 2024-03-29

## 2024-03-29 RX ORDER — FAMOTIDINE 10 MG/ML
20 INJECTION, SOLUTION INTRAVENOUS ONCE
Status: COMPLETED | OUTPATIENT
Start: 2024-03-29 | End: 2024-03-29

## 2024-03-29 RX ORDER — POTASSIUM CHLORIDE 20 MEQ/1
40 TABLET, EXTENDED RELEASE ORAL ONCE
Status: DISCONTINUED | OUTPATIENT
Start: 2024-03-29 | End: 2024-03-29

## 2024-03-29 RX ORDER — SODIUM CHLORIDE 9 MG/ML
1000 INJECTION, SOLUTION INTRAVENOUS ONCE
Status: COMPLETED | OUTPATIENT
Start: 2024-03-29 | End: 2024-03-29

## 2024-03-29 NOTE — ED PROVIDER NOTES
Patient Seen in: [facility]      History     Chief Complaint   Patient presents with    Nausea/vomiting     10w pregnant, midwife recommended IV fluids for dehydration - Entered by patient     Stated Complaint: Nausea/vomiting - 10w pregnant, midwife recommended IV fluids for dehydration    Subjective:   HPI    Patient is a  female that presents with nausea vomiting persistent over the past week.  Patient roughly 10 weeks pregnant, denies abdominal pain or vaginal bleeding.  Had telemedicine visit with midwife this morning regarding persistent nausea vomiting refractory to p.o. Zofran, Unisom, B6.  Instructed to go to ED for IV hydration.  Patient has symptoms today feel similar to previous hyperemesis gravidarum with prior pregnancy.  Denying chest pain shortness of breath.    Objective:   Past Medical History:   Diagnosis Date    Amenorrhea     irreg cycles    Anxiety     2016 therapy & meds    Asthma (HCC)     Depression     therapy & meds since 2016    Fibroids     found on fertility u/s    Heart murmur     as a child. resolved now    History of chicken pox     at age 2    Hyperlipidemia     borderline     Infertility, female     IUI w/ clomid              Past Surgical History:   Procedure Laterality Date    D & C      WISDOM TEETH REMOVED                      Social History     Socioeconomic History    Marital status:      Spouse name: Peewee Mesa    Number of children: 0    Years of education: 18    Highest education level: Master's degree (e.g., MA, MS, Jocelyn, MEd, MSW, LUIS)   Occupational History    Occupation: [employer]    Tobacco Use    Smoking status: Never    Smokeless tobacco: Never   Vaping Use    Vaping Use: Never used   Substance and Sexual Activity    Alcohol use: Not Currently     Comment: weekly    Drug use: Never   Other Topics Concern    Caffeine Concern No    Exercise No    Seat Belt Yes    Special Diet Yes     Comment: lactose free    Stress Concern No      Service No   Social History Narrative    The patient does not use an assistive device..      The patient does live in a home with stairs.     Social Determinants of Health     Financial Resource Strain: Low Risk  (3/20/2024)    Financial Resource Strain     Difficulty of Paying Living Expenses: Not hard at all     Med Affordability: No   Food Insecurity: No Food Insecurity (3/20/2024)    Food Insecurity     Food Insecurity: Never true   Transportation Needs: No Transportation Needs (3/20/2024)    Transportation Needs     Lack of Transportation: No   Stress: No Stress Concern Present (3/20/2024)    Stress     Feeling of Stress : No   Housing Stability: Low Risk  (3/20/2024)    Housing Stability     Housing Instability: No              Review of Systems    Positive for stated complaint: Nausea/vomiting - 10w pregnant, midwife recommended IV fluids for dehydration  Other systems are as noted in HPI.  Constitutional and vital signs reviewed.      All other systems reviewed and negative except as noted above.    Physical Exam     ED Triage Vitals [03/29/24 1101]   /83   Pulse 106   Resp 20   Temp 97.8 °F (36.6 °C)   Temp src Temporal   SpO2 100 %   O2 Device None (Room air)       Current:/83   Pulse 106   Temp 97.8 °F (36.6 °C) (Temporal)   Resp 20   Ht 175.3 cm (5' 9\")   Wt 79.4 kg   LMP 01/19/2024 (Exact Date)   SpO2 100%   BMI 25.84 kg/m²         Physical Exam    Vital signs reviewed. Nursing note reviewed.  Constitutional: Well-developed. Well-nourished. In no acute distress  HENT: Mucous membranes moist.   EYES: No scleral icterus or conjunctival injection.  NECK: Full ROM. Supple.   CARDIAC: Normal rate. Normal S1/ S2. 2+ distal pulses. No edema  PULM/CHEST: Clear to auscultation bilaterally. No wheezes  ABD: Soft, non-tender, non-distended.   : No CVA tenderness.  RECTAL: deferred  Extremities: Full ROM  NEURO: Awake, alert, following commands, moving extremities, answering questions.   SKIN:  Warm and dry. No rash or lesions.  PSYCH: Normal judgment. Normal affect.                    MDM      Patient is a 33-year-old G2, P1 female that presents to immediate care due to persistent nausea vomiting worsening over the past week.  Patient arrives normotensive, mildly tachycardic however nontoxic sitting comfortably.   Most likely hyperemesis gravidarum, will obtain basic labs to evaluate for electrolyte abnormalities, significant dehydration.  Less likely abdominal pathology including acute appendicitis given soft abdomen to palpation on physical exam.  Will treat with fluids IV Pepcid Zofran and reassess.  History given by patient.  Care discussed at this time with attending Dr. Boucher           ED Course     Labs Reviewed   POCT CBC - Abnormal; Notable for the following components:       Result Value    MCV IC 77.9 (*)     MCH IC 25.8 (*)     All other components within normal limits   POCT ISTAT CHEM8 CARTRIDGE - Abnormal; Notable for the following components:    ISTAT BUN 5 (*)     ISTAT Potassium 3.4 (*)     ISTAT Glucose 108 (*)     ISTAT Creatinine 0.50 (*)     All other components within normal limits        12:43 PM  Patient notes improvement in symptoms after IV fluids and medication.  Tolerating p.o. crackers and water in room.  Mild hypokalemia, potassium 3.4, plan to orally replenished however patient states that she is unable to tolerate pills states that normally she cannot take pills regularly.  States that she is unable to even take prenatal vitamins and has been taking a small folic acid tablet instead.  Discussed with patient to replenish via food, banana or potato chips.  OB/GYN follow-up 1 to 2 days.  New or worsening symptoms to go to nearest emergency department for further evaluation.  Patient agreeable to plan                           Medical Decision Making      Disposition and Plan     Clinical Impression:  1. Hyperemesis gravidarum (HCC)    2. Hypokalemia          Disposition:  Discharge  3/29/2024 12:43 pm    Follow-up:  Aliyah Mckinney MD  [address] [place] [address]  [phone]    Call             Medications Prescribed:  Discharge Medication List as of 3/29/2024 12:47 PM

## 2024-03-29 NOTE — ED INITIAL ASSESSMENT (HPI)
Pt presents to the IC with c/o n/v while 10 weeks pregnant. Pt has been taking zofran and pepcid without relief. No abd pain, cramping or vaginal bleeding. Denies diarrhea and constipation.

## 2024-03-29 NOTE — PATIENT INSTRUCTIONS
Pregnancy: Body Changes  From conception (fertilization) until after the birth of your child, you and your baby will change every day. To help you understand what is happening, we’ve outlined how pregnancy begins and some of the changes you may notice.    Your changing body  Pregnancy affects almost every part of your body. You may notice some of the following physical and emotional changes:  Your uterus expands outward and upward as your baby grows. You may feel pressure on your bladder, stomach, and other organs.  You may notice skin color changes on your forehead, nose, and cheeks. A dark line may form from your bellybutton down to your pubic area. The skin color around your nipples and thighs may also change.  Pink stretch marks may appear on your abdomen, breasts, or hips.  Your hair may seem thicker. You lose less hair during pregnancy.  You may feel fine 1 day and weepy the next. This is caused by changes in your body, like increased hormones. These are chemicals that affect the function of certain organs and also your moods.  You may experience constipation, hemorrhoids, and/or heartburn.  You may experience mild shortness of breath.  Your legs may cramp.  You may have nausea and vomiting.  You may experience dizziness, extreme tiredness, and sleep problems.  You may experience temporary bladder control problems.  Nose bleeds and nasal stuffiness are common.     The fertilized egg travels down the fallopian tube and attaches to the uterus.    How pregnancy begins  Conception is the union of a sperm and an egg. When it happens, your baby’s genetic makeup is complete, even its sex. Fertilization takes place in the fallopian tube. The fertilized egg then travels down this tube to the uterus (womb). The egg attaches to the lining of the uterus about a week after conception. There it grows and is nourished.  [employer] last reviewed this educational content on 1/1/2018  © 0528-9541 [employer]  [address] [place] [address]. All rights reserved. This information is not intended as a substitute for professional medical care. Always follow your healthcare professional's instructions.        Pregnancy: Common Questions  There are plenty of myths and “old wives’ tales” surrounding pregnancy. You may need help  fact from fiction. On this sheet, you’ll find answers to a few common questions. If you have other questions, talk with your healthcare provider.     Will working harm my baby?  In most cases, working throughout your pregnancy is not harmful at all. There may be concerns if the job involves dangerous machinery or chemicals, lifting, or standing for very long periods of time. Talk to your healthcare provider and employer about your particular job and pregnancy.   Is it safe to have sexual relations during pregnancy?  Yes, unless you are specifically instructed not to by your healthcare provider.  Why can’t I change the cat litter box?  Cats carry a disease called toxoplasmosis. In adult humans, it shows up as a mild infection of the blood and organs. If you are infected during pregnancy, the baby’s brain and eyes could be damaged. To be safe, have someone else change the litter. If you must handle it, wear a paper mask over your nose and mouth. Also, wear gloves and wash your hands afterward.   Which medicines are safe?  No prescription or over-the-counter medicine is safe for everyone all of the time. But sometimes medicines are needed.  Be sure your healthcare provider knows you are pregnant. Then use only the medicines he or she advises you to take.   Is it true that I can overheat my baby?  Yes. To avoid making your baby too warm:  Don’t sit in a Jacuzzi. A long, warm bath is fine, but not in water over 100°F (37.7°C).  Exercise less intensely if you feel fatigued. Base your workout on how you feel, not your heart rate. Heart rates aren’t a good way to measure effort during  pregnancy.  Can I lift and carry safely?  Yes, if your healthcare provider doesn’t tell you otherwise. Learn to lift and carry safely to avoid injury and reduce back pain during pregnancy. To protect your back:   Bend at the knees to bring the load nearer.  Get a good . Test the weight of the load.  Tighten your belly. Exhale as you lift.  Lift with your legs, not with your back.  Carry the load close to your body.  Hold the load so you can see where you are going.  What if I get sick?  Most women get sick at least once during pregnancy. Talk with your healthcare provider if you do. Most likely it will not affect your pregnancy. Get plenty of rest and fluids, and eat what you can. Talk to your healthcare provider before taking any medicines.   [employer] last reviewed this educational content on 10/1/2017  © 5688-0270 [employer]. [address] [place] [address]. All rights reserved. This information is not intended as a substitute for professional medical care. Always follow your healthcare professional's instructions.        Pregnancy: More Common Questions  On this sheet, you’ll find answers to some common questions about pregnancy. If you have other questions, talk with your healthcare provider.    Will traveling be too stressful?  Not if you set the pace. When you plan a trip, allow time to stop and rest. You may even want to postpone travel until the second trimester, when your body is more adjusted to pregnancy. Don’t wait as long as the third trimester, because of the possibility of going into early labor. Travel to a location where healthcare facilities are close by. You may want to take a copy of your records with you in case you need medical care when you are traveling.  Can I still be a vegetarian?  Yes. Be sure to consult a registered dietitian. And be sure to get enough of the following:  Protein. Eat eggs and milk if you’re an ovo-lacto vegetarian. Eat vegetable proteins,  like tofu and beans, if you’re a vegan.  Calcium. If you don’t eat dairy, try soy milk, soy cheese fortified with calcium, and orange juice fortified with calcium.  Vitamin B12. You may need to take a supplement that includes folic acid.  Vitamin D. If you don’t drink milk, ask about taking a supplement.  Iron. Your healthcare provider may recommend a supplement.  Can I paint my nails?  Yes. Nail polish is not thought to be dangerous during pregnancy. Just be careful about breathing the fumes. Keep windows open or use a fan. However, long-term exposure to the solvents used to remove nail polish may not be safe. If you work in a nail salon, talk with your healthcare provider about safety concerns.  Should I eat more if I exercise?  You will only need an extra 100 calories for each 30 minutes of mild exercise. But you’ll also need more fluids. Drink at least an extra 8 ounces of water.  Do I have to stop jogging?  You can jog as long as you are comfortable. Many women find the impact or bounce of a jog doesn’t feel good. Some switch to brisk walking as their pregnancy advances.  What should I limit or avoid eating or drinking?  Don't drink unpasteurized milk products or juices.  Don't eat raw or undercooked meat, poultry, fish, or eggs.   Don't eat prepared meats, like hot dogs or deli meat, unless served steaming hot.  Don't drink alcohol.  Limit caffeine unless your healthcare provider tells you otherwise.    Can I lift weights?  If you have been lifting weights, there is no need to stop. Instead, keep the weights light and in control. Never hold your breath. If you haven’t been lifting weights, don’t start now.  [employer] last reviewed this educational content on 10/1/2017  © 0327-5814 [employer]. [address] [place] [address], [place] [address]. All rights reserved. This information is not intended as a substitute for professional medical care. Always follow your healthcare professional's instructions.

## 2024-03-29 NOTE — TELEPHONE ENCOUNTER
Name and  verified    Patient states she is throwing up and dry heaving several times a day. Already taking zofran, b6 and unisom. Scheduled for 9:45 video visit with CURTIS.

## 2024-03-29 NOTE — PROGRESS NOTES
This visit is conducted using Telemedicine with live, interactive video and audio.    Patient has elected a telehealth video visit and consents to care by this method today. Pt is at home and provider is in [facility] office. No other persons participated in the telehealth service today.     Patient has been referred to the [facility] website at [email or web address] to review the yearly Consent to Treat document.    Patient understands and accepts financial responsibility for any deductible, co-insurance and/or co-pays associated with this service.    Duration of telehealth visit was 12 minutes. In total, 15 minutes of face-to-face counseling, chart review, orders and coordination of care.    Corinne Denise Macnichol is present by video today with concern for excessive nausea/vomiting in early pregnancy. She is taking Unisom and B6 at night and has Zofran dissolvable strips every now and then because she doesn't think they are working. Unable so swallow water so only intake is sucking on ice cubes or popsicles. She also reports that she believes her Zoloft and Wellbutrin, when taken at night, are causing more nausea so she has started taking it during the day on some occasions. She is taking folic acid instead of a full prenatal vitamin. She was taking Pepcid but has trouble swallowing pills and needed too much water to get it down and couldn't do it. At this point she is missing work and has dry heaves all day and has not keeping fluids down for more than 12 hours.    Patient Active Problem List   Diagnosis    Leg pain, posterior, left    Chronic left-sided lumbar radiculopathy    Major depressive disorder    Asthma (HCC)    Moderate episode of recurrent major depressive disorder (HCC)    Anxiety disorder    Recurrent major depression in partial remission (HCC)    Drug-induced sexual dysfunction (HCC)    Nausea/vomiting in pregnancy (HCC)      Physical Exam:  General:  Alert and oriented, No acute distress  Respiratory:   Non-labor respirations  Psychiatric: Cooperative, appropriate mood and affect    Today's plan: Pt sent to ED for IV hydration. Advised Zofran, Unisom, and B6 around the clock and to add Pepcid BID. If ED adds other medications safe in pregnancy, OK to add to regimen  Next visit: 12 wga as scheduled or PRN    Reviewed:   Hydration in pregnancy and how it is related to increased nausea  How/when to take medications  Danger signs    I have spent 15 minutes total time on the day of the encounter, including: preparing to see the patient, ordering/reviewing labs, performing a medically appropriate exam, and providing care coordination. face to face counseling, chart review, orders and coordination of care    Pt verbalized understanding. All questions answered. No barriers to learning identified

## 2024-04-08 ENCOUNTER — LAB ENCOUNTER (OUTPATIENT)
Dept: LAB | Age: 34
End: 2024-04-08
Attending: ADVANCED PRACTICE MIDWIFE
Payer: COMMERCIAL

## 2024-04-08 DIAGNOSIS — E07.9 DISORDER OF THYROID, ANTEPARTUM (HCC): ICD-10-CM

## 2024-04-08 DIAGNOSIS — O09.521 ADVANCED MATERNAL AGE IN MULTIGRAVIDA, FIRST TRIMESTER (HCC): ICD-10-CM

## 2024-04-08 DIAGNOSIS — O99.280 DISORDER OF THYROID, ANTEPARTUM (HCC): ICD-10-CM

## 2024-04-08 DIAGNOSIS — Z34.81 ENCOUNTER FOR SUPERVISION OF OTHER NORMAL PREGNANCY IN FIRST TRIMESTER (HCC): ICD-10-CM

## 2024-04-08 LAB
ANTIBODY SCREEN: NEGATIVE
BASOPHILS # BLD AUTO: 0.02 X10(3) UL (ref 0–0.2)
BASOPHILS NFR BLD AUTO: 0.2 %
DEPRECATED RDW RBC AUTO: 41.4 FL (ref 35.1–46.3)
EOSINOPHIL # BLD AUTO: 0.19 X10(3) UL (ref 0–0.7)
EOSINOPHIL NFR BLD AUTO: 2.2 %
ERYTHROCYTE [DISTWIDTH] IN BLOOD BY AUTOMATED COUNT: 14.6 % (ref 11–15)
EST. AVERAGE GLUCOSE BLD GHB EST-MCNC: 97 MG/DL (ref 68–126)
HBA1C MFR BLD: 5 % (ref ?–5.7)
HBV SURFACE AG SER-ACNC: <0.1 [IU]/L
HBV SURFACE AG SERPL QL IA: NONREACTIVE
HCT VFR BLD AUTO: 38.3 %
HCV AB SERPL QL IA: NONREACTIVE
HGB BLD-MCNC: 13.1 G/DL
IMM GRANULOCYTES # BLD AUTO: 0.02 X10(3) UL (ref 0–1)
IMM GRANULOCYTES NFR BLD: 0.2 %
LYMPHOCYTES # BLD AUTO: 2.2 X10(3) UL (ref 1–4)
LYMPHOCYTES NFR BLD AUTO: 25.7 %
MCH RBC QN AUTO: 27 PG (ref 26–34)
MCHC RBC AUTO-ENTMCNC: 34.2 G/DL (ref 31–37)
MCV RBC AUTO: 78.8 FL
MONOCYTES # BLD AUTO: 0.69 X10(3) UL (ref 0.1–1)
MONOCYTES NFR BLD AUTO: 8.1 %
NEUTROPHILS # BLD AUTO: 5.43 X10 (3) UL (ref 1.5–7.7)
NEUTROPHILS # BLD AUTO: 5.43 X10(3) UL (ref 1.5–7.7)
NEUTROPHILS NFR BLD AUTO: 63.6 %
PLATELET # BLD AUTO: 284 10(3)UL (ref 150–450)
RBC # BLD AUTO: 4.86 X10(6)UL
RH BLOOD TYPE: POSITIVE
RUBV IGG SER QL: POSITIVE
RUBV IGG SER-ACNC: 24.3 IU/ML (ref 10–?)
T PALLIDUM AB SER QL IA: NONREACTIVE
T4 FREE SERPL-MCNC: 1.3 NG/DL (ref 0.8–1.7)
TSI SER-ACNC: 0.06 MIU/ML (ref 0.55–4.78)
WBC # BLD AUTO: 8.6 X10(3) UL (ref 4–11)

## 2024-04-08 PROCEDURE — 87340 HEPATITIS B SURFACE AG IA: CPT

## 2024-04-08 PROCEDURE — 84439 ASSAY OF FREE THYROXINE: CPT

## 2024-04-08 PROCEDURE — 83036 HEMOGLOBIN GLYCOSYLATED A1C: CPT

## 2024-04-08 PROCEDURE — 85025 COMPLETE CBC W/AUTO DIFF WBC: CPT

## 2024-04-08 PROCEDURE — 84445 ASSAY OF TSI GLOBULIN: CPT

## 2024-04-08 PROCEDURE — 86787 VARICELLA-ZOSTER ANTIBODY: CPT

## 2024-04-08 PROCEDURE — 83020 HEMOGLOBIN ELECTROPHORESIS: CPT

## 2024-04-08 PROCEDURE — 86901 BLOOD TYPING SEROLOGIC RH(D): CPT

## 2024-04-08 PROCEDURE — 86803 HEPATITIS C AB TEST: CPT

## 2024-04-08 PROCEDURE — 86850 RBC ANTIBODY SCREEN: CPT

## 2024-04-08 PROCEDURE — 83021 HEMOGLOBIN CHROMOTOGRAPHY: CPT

## 2024-04-08 PROCEDURE — 86762 RUBELLA ANTIBODY: CPT

## 2024-04-08 PROCEDURE — 87086 URINE CULTURE/COLONY COUNT: CPT

## 2024-04-08 PROCEDURE — 86780 TREPONEMA PALLIDUM: CPT

## 2024-04-08 PROCEDURE — 84443 ASSAY THYROID STIM HORMONE: CPT

## 2024-04-08 PROCEDURE — 36415 COLL VENOUS BLD VENIPUNCTURE: CPT

## 2024-04-08 PROCEDURE — 87389 HIV-1 AG W/HIV-1&-2 AB AG IA: CPT

## 2024-04-08 PROCEDURE — 86900 BLOOD TYPING SEROLOGIC ABO: CPT

## 2024-04-10 ENCOUNTER — HOSPITAL ENCOUNTER (OUTPATIENT)
Dept: PERINATAL CARE | Facility: HOSPITAL | Age: 34
Discharge: HOME | End: 2024-04-10
Attending: ADVANCED PRACTICE MIDWIFE
Payer: COMMERCIAL

## 2024-04-10 ENCOUNTER — INITIAL PRENATAL (OUTPATIENT)
Dept: OBGYN CLINIC | Facility: CLINIC | Age: 34
End: 2024-04-10
Payer: COMMERCIAL

## 2024-04-10 ENCOUNTER — TELEPHONE (OUTPATIENT)
Dept: OBGYN CLINIC | Facility: CLINIC | Age: 34
End: 2024-04-10

## 2024-04-10 ENCOUNTER — HOSPITAL ENCOUNTER (OUTPATIENT)
Dept: PERINATAL CARE | Facility: HOSPITAL | Age: 34
Discharge: HOME | End: 2024-04-10
Attending: OBSTETRICS & GYNECOLOGY
Payer: COMMERCIAL

## 2024-04-10 ENCOUNTER — LAB ENCOUNTER (OUTPATIENT)
Dept: LAB | Facility: HOSPITAL | Age: 34
End: 2024-04-10
Attending: ADVANCED PRACTICE MIDWIFE
Payer: COMMERCIAL

## 2024-04-10 VITALS
DIASTOLIC BLOOD PRESSURE: 81 MMHG | SYSTOLIC BLOOD PRESSURE: 126 MMHG | WEIGHT: 173 LBS | HEART RATE: 90 BPM | BODY MASS INDEX: 26 KG/M2

## 2024-04-10 VITALS
HEART RATE: 89 BPM | SYSTOLIC BLOOD PRESSURE: 119 MMHG | BODY MASS INDEX: 25 KG/M2 | WEIGHT: 172 LBS | DIASTOLIC BLOOD PRESSURE: 83 MMHG

## 2024-04-10 DIAGNOSIS — Z36.9 FIRST TRIMESTER SCREENING (HCC): Primary | ICD-10-CM

## 2024-04-10 DIAGNOSIS — O21.9 NAUSEA/VOMITING IN PREGNANCY (HCC): ICD-10-CM

## 2024-04-10 DIAGNOSIS — R79.89 LOW TSH LEVEL: ICD-10-CM

## 2024-04-10 DIAGNOSIS — O09.521 ADVANCED MATERNAL AGE IN MULTIGRAVIDA, FIRST TRIMESTER (HCC): ICD-10-CM

## 2024-04-10 DIAGNOSIS — O99.281: ICD-10-CM

## 2024-04-10 DIAGNOSIS — Z34.81 PRENATAL CARE, SUBSEQUENT PREGNANCY IN FIRST TRIMESTER (HCC): Primary | ICD-10-CM

## 2024-04-10 DIAGNOSIS — E07.9: ICD-10-CM

## 2024-04-10 DIAGNOSIS — R79.89 LOW TSH LEVEL: Primary | ICD-10-CM

## 2024-04-10 DIAGNOSIS — Z36.9 FIRST TRIMESTER SCREENING (HCC): ICD-10-CM

## 2024-04-10 DIAGNOSIS — O09.521 SUPERVISION OF ELDERLY MULTIGRAVIDA IN FIRST TRIMESTER (HCC): ICD-10-CM

## 2024-04-10 LAB
HGB A2 MFR BLD: 2.7 % (ref 1.5–3.5)
HGB PNL BLD ELPH: 97.3 % (ref 95.5–100)
VZV IGG SER IA-ACNC: 2564 (ref 165–?)

## 2024-04-10 PROCEDURE — 76813 OB US NUCHAL MEAS 1 GEST: CPT | Performed by: OBSTETRICS & GYNECOLOGY

## 2024-04-10 NOTE — PROGRESS NOTES
Reason for Consult:   Dear Ms. Rojas,    Thank you for requesting ultrasound evaluation and maternal fetal medicine consultation on Corinne Denise Macnichol.  As you are aware she is a 33 year old female with a Bruce pregnancy at 12w2d.  A maternal-fetal medicine consultation was requested secondary to  AMA.  Her prenatal records and labs were reviewed.    Review of History:     OB History:    OB History    Para Term  AB Living   3 1 1 0 1 1   SAB IAB Ectopic Multiple Live Births   1 0 0 0 1      # Outcome Date GA Lbr Castro/2nd Weight Sex Type Anes PTL Lv   3 Current            2 Term 22 39w0d 11:28 / 03:03 7 lb 4.1 oz (3.29 kg) M NORMAL SPONT EPI N MELVIN      Complications: Variable decelerations, Late decelerations      Name: BERTO SANCHEZ      Apgar1: 8  Apgar5: 9   1 SAB 2020 8w0d             Birth Comments: D&C 2020             Allergies:  No Known Allergies   Current Meds:  Current Outpatient Medications   Medication Sig Dispense Refill    folic acid 1 MG Oral Tab Take 1 tablet (1 mg total) by mouth daily.      ondansetron (ZOFRAN) 4 mg tablet Take 1 tablet (4 mg total) by mouth every 8 (eight) hours as needed for Nausea. 30 tablet 0    sertraline (ZOLOFT) 50 MG Oral Tab Take 1 tablet (50 mg total) by mouth every morning. 90 tablet 1    buPROPion ER (WELLBUTRIN XL) 150 MG Oral Tablet 24 Hr Take 1 tablet (150 mg total) by mouth nightly. 90 tablet 1    sertraline 50 MG Oral Tab Take 1 tablet (50 mg total) by mouth daily. 90 tablet 1        HISTORY:  Past Medical History:    Amenorrhea    irreg cycles    Anxiety    2016 therapy & meds    Asthma (HCC)    Depression    therapy & meds since 2016    Fibroids    found on fertility u/s    Heart murmur    as a child. resolved now    History of chicken pox    at age 2    Hyperlipidemia    borderline     Infertility, female    IUI w/ clomid      Past Surgical History:   Procedure Laterality Date    D & c      [place] teeth removed             Family History   Problem Relation Age of Onset    Thyroid disease Father     Other (Other) Father     Diabetes Brother     Hypertension Mother     Other (Other) Mother         cataracts    COPD Maternal Grandmother     Heart Disorder Maternal Grandmother     Cancer Maternal Grandfather         prostate    Other (Other) Maternal Grandfather         emphysema    Cancer Paternal Grandmother         brain      Social History     Socioeconomic History    Marital status:      Spouse name: Peewee Mesa    Number of children: 0    Years of education: 18    Highest education level: Master's degree (e.g., MA, MS, Jocelyn, MEd, MSW, LUIS)   Occupational History    Occupation: [employer]    Tobacco Use    Smoking status: Never    Smokeless tobacco: Never   Vaping Use    Vaping status: Never Used   Substance and Sexual Activity    Alcohol use: Not Currently     Comment: weekly    Drug use: Never   Other Topics Concern    Caffeine Concern No    Exercise No    Seat Belt Yes    Special Diet Yes     Comment: lactose free    Stress Concern No     Service No   Social History Narrative    The patient does not use an assistive device..      The patient does live in a home with stairs.     Social Determinants of Health     Financial Resource Strain: Low Risk  (3/20/2024)    Financial Resource Strain     Difficulty of Paying Living Expenses: Not hard at all     Med Affordability: No   Food Insecurity: No Food Insecurity (3/20/2024)    Food Insecurity     Food Insecurity: Never true   Transportation Needs: No Transportation Needs (3/20/2024)    Transportation Needs     Lack of Transportation: No   Stress: No Stress Concern Present (3/20/2024)    Stress     Feeling of Stress : No   Housing Stability: Low Risk  (3/20/2024)    Housing Stability     Housing Instability: No        NARRATIVE:     /83   Pulse 89   Wt 172 lb (78 kg)   LMP 01/19/2024 (Exact Date)   BMI 25.40 kg/m²           Alert and Oriented.  No acute  distress          Abdomen:  soft, nontender, no contractions noted.           extremities:  nontender, no edema      DISCUSSION  During her visit we discussed and reviewed the following issues:  ADVANCED MATERNAL AGE    Background  I reviewed with the patient that pregnancies in women of advanced maternal age (35 or older at delivery) are associated with elevated risks. Specifically, there is a higher rate of:  Fetal malformations  Preeclampsia  Gestational diabetes  Intrauterine fetal death    As a result, enhanced pregnancy surveillance is advised for these patients including a comprehensive ultrasound to assess for fetal malformations (at 20 weeks) and a third trimester ultrasound assessment for fetal growth (at 32 weeks). In addition, weekly NST's (initiating at 36 weeks gestation for women 35-39 years and at 32 weeks gestation for women 40 years and older) are also advised. Routine obstetric care is more than adequate to assess for gestational diabetes and preeclampsia; hence, no further significant alterations in obstetric care are advised.    Medical Complications    Women 35 years of age or older can expect to experience two to three fold higher rates of hospitalization,  delivery, and pregnancy-related complications when compared to their younger counterparts.  The two most common medical problems complicating these  pregnanccies are hypertension and diabetes.   The incidence of preeclampsia in the general obstetric population is 3 to 4 percent; this increases to 5 to 10 percent in women over age 40 and is as high as 35 percent in women over age 50.   The incidence of gestational diabetes in the general obstetric population is 3 percent, rising to 7 to 12 percent in women over age 40 and 20 percent in women over age 50.  Women 35 years of age or older are more likely to be delivered by . The  delivery rate in the general obstetric population of the [place] is almost 30 percent,  compared to almost 50 percent in women over age 40 to 45 and almost 80 percent in women age 50 to 63.          Fetal Death        A decision analysis tool using data from the [place] [facility]  [facility] predicted a strategy of weekly antepartum testing and labor induction would lower the risk of unexplained fetal death in women 35 years of age or older. In this model, weekly testing starting at 36 weeks of gestation would drop the risk of fetal death from 5.2 to 1.3 per 1000 pregnancies. While a policy of antepartum testing in older women does increase the chance that a women will be induced (71 inductions per fetal death averted) and thereby increases her risk of having a  delivery, only 14 additional cesareans would need to be performed to avert one unexplained fetal death.  Hence, weekly NST's are advised for women of advanced maternal age; testing should be initiated at 36 weeks for women 35-39 years and at 32 weeks for women 40 years and older.    Fetal Malformations    Cardiac malformations, clubfoot, and diaphragmatic hernia appear to occur with increased frequency in offspring of older women. These abnormalities are structural and unrelated to aneuploidy, thus they would not be detected by karyotype analysis.  For these reasons a complete, detailed ultrasound (level II) is advised even if the fetus has a normal karyotype.      Fetal Aneuploidy      Invasive Testing  I offered invasive genetic testing (amniocentesis, chorionic villus sampling) after reviewing the diagnostic accuracy of these tests as well as the procedure associated loss rate (1:500 for genetic amniocentesis).        We discussed  the increased risk of chromosomal abnormalities associated with advanced maternal age at age 35.  She understands that ultrasound exam cannot exclude potential genetic abnormalities.  Her estimated risk based on maternal age at amniocentesis with any chromosome abnormality is about 1:140  and with  Down Syndrome is about 1: 250.   We also discussed the risks and benefits of having  genetic testing ([facility] and amniocentesis) performed.      Non-invasive Pregnancy Testing (NIPT)  I reviewed current non-invasive screening options. Currently non-invasive pregnancy testing (NIPT) offers the highest detection rate (with the lowest false positive rate) for the detection of fetal aneuploidy amongst high-risk patients. The limitations of detailed mid-trimester sonography was reviewed with the patient. First trimester screening and second trimester multiple-marker serum serum screening as alternative aneuploidy screening options were also reviewed. However, both of these tests are associated with lower detection and higher false positive rates.                  OB ULTRASOUND REPORT   See imaging tab for complete ultrasound report     Fetal Heart Rate: Present 169 bpm  Fetal Presentation: Transverse Right  Amniotic fluid MVP: WNL  Placental Location: Posterior       FIRST TRIMESTER SCREENING:    The CRL is consistent with the gestational age.  The nuchal translucency measures   1.9  mm. This is within normal limits.  The nasal bone is present.  Fetus: arms and legs seen suboptimally. Fetal head/skull and abdomen appeared normal. Stomach and bladder seen.   Uterus and adnexa appeared normal        IMPRESSION:   1. IUP @  12w2d  2. Scan consistent with dates  3. No fetal structural abnormalities seen but limited by early gestational age  4.  AMA      RECOMMENDATIONS:   1.  Weekly NST at 36wks  2.  Growth US at 32wks  3.  Level 2 US at 20wks  4.  [id number] test ordered    Thank you for allowing me to participate in the care of your patient.  Please do not hesitate to call with any questions or concerns.     Total time spent   40  minutes this calendar day which includes preparing to see the patient including chart review, obtaining and/or reviewing additional medical history, performing a physical exam and evaluation,  documenting clinical information in the electronic medical record, independently interpreting results, counseling the patient, communicating results to the patient/family/caregiver and coordinating care.    John Oh D.O.  Maternal Fetal Medicine     Note to patient and family:  The 21st Century Cures Act makes medical notes available to patients in the interest of transparency.  However, please be advised that this is a medical document.  It is intended as a peer to peer communication.  It is written in medical language and may contain abbreviations or verbiage that are technical and unfamiliar.  It may appear blunt or direct.  Medical documents are intended to carry relevant information, facts as evident, and the clinical opinion of the practitioner.

## 2024-04-10 NOTE — PATIENT INSTRUCTIONS
Comfort Tips During Pregnancy    Talk with your healthcare provider before using pain-relieving medicine at any time during your pregnancy.  First trimester tips  Nausea  Get up slowly. Eat a few unsalted crackers before you get out of bed.  Avoid smells that bother you.  Eat small bland low fat, light high-protein meals at frequent intervals.  Sip on water, weak tea, or clear soft drinks, like ginger ale. Eat ice chips.  Fatigue  Take catnaps when you can.  Get regular exercise.  Accept help from others.  Practice good sleep habits, like going to bed and getting up at the same time each day. Use your bed only for sleep and sex.  Mood swings  Talk about your feelings with others, including other mothers.  Limit sugar, chocolate, and caffeine.  Eat a healthy diet. Don’t skip meals.  Get regular exercise.  Headaches  Get fresh air and exercise.  Relax and get enough rest.  Check with your healthcare provider before taking any pain medicines.  Second trimester tips  Here are some suggestions to help you cope:  To limit ankle swelling, sit with your feet raised or wear support hose.  If you have pain in your groin and stomach (round ligament pain), avoid sudden twisting movements.  For leg cramps, flexing your foot often brings immediate relief. You also may try massaging your calf in long, downward strokes, or stretching your legs before going to bed. Get enough exercise and wear shoes with flexible soles.  Third trimester tips  Reducing heartburn  Eat small, light meals throughout the day rather than 3 large ones.  Sleep with your upper body raised 6 inches. Don’t lie down until 2 hours after you eat.  Don't eat greasy, fried, or spicy foods.  Avoid citrus fruits and juices.  Treating constipation  Eat foods high in fiber (whole-grain foods, fresh fruit and vegetables).  Drink plenty of water.  Get regular exercise.  Discuss other medicines (like docusate and psyllium) with your healthcare provider.  Taking care  of your breasts  Avoid using harsh soaps or alcohol, which can cause excessive dryness.  Wear nursing bras. They provide more support than regular bras and can be used after pregnancy if you breastfeed.  Getting a good night’s sleep  Take a warm shower before bed.  Sleep on a firm mattress.  Lie on your side with 1 leg crossed over the other.  Use pillows to support arms, legs, and belly.  [employer] last reviewed this educational content on 10/1/2017  © 8304-9110 The [employer]. [address] [place] [address]. All rights reserved. This information is not intended as a substitute for professional medical care. Always follow your healthcare professional's instructions.        Adapting to Pregnancy: Second Trimester    Keep up the healthy habits you started in your first trimester. You might be a little more tired than normal. So plan your day wisely. Look at the tips below and choose the ones that suit your lifestyle.  If you have any questions, check with your healthcare provider.  If you work  If you can, adjust your work with your employer to fit your needs. Try these tips:  If you stand for long periods, find ways to do some tasks while sitting. Also, try to stand with 1 foot resting on a low stool or ledge. Shift your weight from foot to foot often. Wear low-heeled shoes.  If you sit, keep your knees level with your hips. Rest your feet on a firm surface. Sit tall with support for your low back.  If you work long hours, ask about adjusting your schedule. Try taking shorter breaks more often.  When you travel  The second trimester may be the best time for any travel. Talk to your healthcare provider about any special plans you may need to make. Always:  Wear a seat belt. Fasten the lap part under your belly. Wear the shoulder part also.  Take breaks often during long trips by car or plane. Move around to stretch your legs.  Drink plenty of fluids on flights. The air in plane cabins is very  dry.  Avoid hot climates or high altitudes if you are not used to them.  Avoid places where the food and water might make you sick.  Make sure you are up-to-date on all immunizations, including the flu vaccine. This is especially important when traveling overseas.  Taking time to relax  Find time to rest and relax at work or at home:  Take short time-outs daily. Do relaxation exercises.  Breathe deeply during stressful times.  Try not to take on too much. Plan tasks for times when you have the most energy.  Take naps when you can. Or just sit and relax.  After week 16, avoid lying on your back for more than a few minutes. Instead, lie on your side. Switch sides often.  Continuing as lovers  Unless your healthcare provider tells you otherwise, there is no reason to stop having sex now. Blood supply increases to the pelvic area in the second trimester. Because of this, sex might be more enjoyable. Try different positions and see what’s best. Also, talk to your partner about any changes in desire. Spotting may happen after sex. Be sure to let your healthcare provider know if there is heavy bleeding.  Keeping your environment safe  You can still clean house and use scented products. Just take some simple precautions:  Wear gloves when using cleaning fluids.  Open windows to let in fresh air. Use a fan if you paint.  Avoid secondhand smoke.  Don’t breathe fumes from nail polish, hair spray, cleansers, or other chemicals.  [employer] last reviewed this educational content on 1/1/2018  © 6883-0348 The [employer], [employer]. [address] [place] [address], [place] [id number]. All rights reserved. This information is not intended as a substitute for professional medical care. Always follow your healthcare professional's instructions.        Pregnancy: Your Second Trimester Changes  Each day, you and your baby are changing and growing together. Here’s a quick look at what’s happening to both of you.  How you are changing  Even when you  don’t notice it, your body is adapting to meet the needs of your growing baby. The changes in your body might also affect your moods.  Your body  Your uterus expands as baby grows. As the weeks go by, you will feel more pressure on your bladder, stomach, and other organs. You may notice some skin color changes on your forehead, nose, or cheeks. Freckles may darken, and moles may grow. You may notice a darker line on your abdomen between your belly button and pubic bone in the midline.  Your moods  The second trimester is often easier than the first. Still, be prepared for mood swings. These are due to the increase in hormones (chemicals that affect the way organs work) produced by your body. These mood swings are a normal part of pregnancy.  How your baby is growing          Month 4  Baby’s heartbeat may be heard with a Doppler (hand-held ultrasound device) by 9 to 10 weeks. Eyebrows, eyelashes, and fingernails begin to form.  Month 5  You may feel your baby move. After a growth spurt, your baby nears 10 inches. Month 6  Baby’s fingerprints have formed. Your baby weighs about 1 to 2 pounds and is about 12 inches long.   [employer] last reviewed this educational content on 1/1/2018  © 2459-0283 The [employer], [employer]. [address] [place] [address], [place] [id number]. All rights reserved. This information is not intended as a substitute for professional medical care. Always follow your healthcare professional's instructions.     Pregnancy: Body Changes  From conception (fertilization) until after the birth of your child, you and your baby will change every day. To help you understand what is happening, we’ve outlined how pregnancy begins and some of the changes you may notice.    Your changing body  Pregnancy affects almost every part of your body. You may notice some of the following physical and emotional changes:  Your uterus expands outward and upward as your baby grows. You may feel pressure on your bladder, stomach,  and other organs.  You may notice skin color changes on your forehead, nose, and cheeks. A dark line may form from your bellybutton down to your pubic area. The skin color around your nipples and thighs may also change.  Pink stretch marks may appear on your abdomen, breasts, or hips.  Your hair may seem thicker. You lose less hair during pregnancy.  You may feel fine 1 day and weepy the next. This is caused by changes in your body, like increased hormones. These are chemicals that affect the function of certain organs and also your moods.  You may experience constipation, hemorrhoids, and/or heartburn.  You may experience mild shortness of breath.  Your legs may cramp.  You may have nausea and vomiting.  You may experience dizziness, extreme tiredness, and sleep problems.  You may experience temporary bladder control problems.  Nose bleeds and nasal stuffiness are common.     The fertilized egg travels down the fallopian tube and attaches to the uterus.    How pregnancy begins  Conception is the union of a sperm and an egg. When it happens, your baby’s genetic makeup is complete, even its sex. Fertilization takes place in the fallopian tube. The fertilized egg then travels down this tube to the uterus (womb). The egg attaches to the lining of the uterus about a week after conception. There it grows and is nourished.  [employer] last reviewed this educational content on 1/1/2018  © 9232-0539 The [employer], [employer]. [address] [place] [address], [place] [address]. All rights reserved. This information is not intended as a substitute for professional medical care. Always follow your healthcare professional's instructions.        Pregnancy: Common Questions  There are plenty of myths and “old wives’ tales” surrounding pregnancy. You may need help  fact from fiction. On this sheet, you’ll find answers to a few common questions. If you have other questions, talk with your healthcare provider.     Will working harm  my baby?  In most cases, working throughout your pregnancy is not harmful at all. There may be concerns if the job involves dangerous machinery or chemicals, lifting, or standing for very long periods of time. Talk to your healthcare provider and employer about your particular job and pregnancy.   Is it safe to have sexual relations during pregnancy?  Yes, unless you are specifically instructed not to by your healthcare provider.  Why can’t I change the cat litter box?  Cats carry a disease called toxoplasmosis. In adult humans, it shows up as a mild infection of the blood and organs. If you are infected during pregnancy, the baby’s brain and eyes could be damaged. To be safe, have someone else change the litter. If you must handle it, wear a paper mask over your nose and mouth. Also, wear gloves and wash your hands afterward.   Which medicines are safe?  No prescription or over-the-counter medicine is safe for everyone all of the time. But sometimes medicines are needed.  Be sure your healthcare provider knows you are pregnant. Then use only the medicines he or she advises you to take.   Is it true that I can overheat my baby?  Yes. To avoid making your baby too warm:  Don’t sit in a Jacuzzi. A long, warm bath is fine, but not in water over 100°F (37.7°C).  Exercise less intensely if you feel fatigued. Base your workout on how you feel, not your heart rate. Heart rates aren’t a good way to measure effort during pregnancy.  Can I lift and carry safely?  Yes, if your healthcare provider doesn’t tell you otherwise. Learn to lift and carry safely to avoid injury and reduce back pain during pregnancy. To protect your back:   Bend at the knees to bring the load nearer.  Get a good . Test the weight of the load.  Tighten your belly. Exhale as you lift.  Lift with your legs, not with your back.  Carry the load close to your body.  Hold the load so you can see where you are going.  What if I get sick?  Most women get  sick at least once during pregnancy. Talk with your healthcare provider if you do. Most likely it will not affect your pregnancy. Get plenty of rest and fluids, and eat what you can. Talk to your healthcare provider before taking any medicines.   [employer] last reviewed this educational content on 10/1/2017  © 8747-1598 The [employer]. [address] [place] [address], [place] [address]. All rights reserved. This information is not intended as a substitute for professional medical care. Always follow your healthcare professional's instructions.        Pregnancy: More Common Questions  On this sheet, you’ll find answers to some common questions about pregnancy. If you have other questions, talk with your healthcare provider.    Will traveling be too stressful?  Not if you set the pace. When you plan a trip, allow time to stop and rest. You may even want to postpone travel until the second trimester, when your body is more adjusted to pregnancy. Don’t wait as long as the third trimester, because of the possibility of going into early labor. Travel to a location where healthcare facilities are close by. You may want to take a copy of your records with you in case you need medical care when you are traveling.  Can I still be a vegetarian?  Yes. Be sure to consult a registered dietitian. And be sure to get enough of the following:  Protein. Eat eggs and milk if you’re an ovo-lacto vegetarian. Eat vegetable proteins, like tofu and beans, if you’re a vegan.  Calcium. If you don’t eat dairy, try soy milk, soy cheese fortified with calcium, and orange juice fortified with calcium.  Vitamin B12. You may need to take a supplement that includes folic acid.  Vitamin D. If you don’t drink milk, ask about taking a supplement.  Iron. Your healthcare provider may recommend a supplement.  Can I paint my nails?  Yes. Nail polish is not thought to be dangerous during pregnancy. Just be careful about breathing the fumes. Keep windows  open or use a fan. However, long-term exposure to the solvents used to remove nail polish may not be safe. If you work in a nail salon, talk with your healthcare provider about safety concerns.  Should I eat more if I exercise?  You will only need an extra 100 calories for each 30 minutes of mild exercise. But you’ll also need more fluids. Drink at least an extra 8 ounces of water.  Do I have to stop jogging?  You can jog as long as you are comfortable. Many women find the impact or bounce of a jog doesn’t feel good. Some switch to brisk walking as their pregnancy advances.  What should I limit or avoid eating or drinking?  Don't drink unpasteurized milk products or juices.  Don't eat raw or undercooked meat, poultry, fish, or eggs.   Don't eat prepared meats, like hot dogs or deli meat, unless served steaming hot.  Don't drink alcohol.  Limit caffeine unless your healthcare provider tells you otherwise.    Can I lift weights?  If you have been lifting weights, there is no need to stop. Instead, keep the weights light and in control. Never hold your breath. If you haven’t been lifting weights, don’t start now.  [employer] last reviewed this educational content on 10/1/2017  © 9240-9907 The [employer], [employer]. [address] [place] [address], [place] [address]. All rights reserved. This information is not intended as a substitute for professional medical care. Always follow your healthcare professional's instructions.

## 2024-04-10 NOTE — TELEPHONE ENCOUNTER
----- Message from Iram Rojas CNM sent at 4/10/2024 10:42 AM CDT -----  Placed order for referral to endocrinology. Please call. TSH is low.

## 2024-04-10 NOTE — PROGRESS NOTES
Corinne, , is at 12w2d, here for her NOB visit.  Currently, she is feeling well. Denies 1st trimester danger signs. N/V is much improved, only emesis in the morning.    Vital signs and weight reviewed  See flowsheets & Prenatal Physical  NT scan WNL and reviewed with patient. NIPT pending    Patient Active Problem List   Diagnosis    Leg pain, posterior, left    Chronic left-sided lumbar radiculopathy    Major depressive disorder    Asthma (HCC)    Moderate episode of recurrent major depressive disorder (HCC)    Anxiety disorder    Recurrent major depression in partial remission (HCC)    Drug-induced sexual dysfunction (HCC)    Nausea/vomiting in pregnancy (HCC)    Low TSH level        Assessment/Plan: NIPT pending  Low TSH: Endo referral in place. Pt to call to schedule  Next visit: 4 weeks    Reviewed:   Prenatal visit schedule  Danger signs    Pt verbalized understanding. All questions answered. No barriers to learning identified

## 2024-04-11 ENCOUNTER — TELEPHONE (OUTPATIENT)
Dept: ENDOCRINOLOGY CLINIC | Facility: CLINIC | Age: 34
End: 2024-04-11

## 2024-04-11 NOTE — TELEPHONE ENCOUNTER
Patient requesting to be seen sooner than next avail for Consult regarding Low TSH level - pt is 12 weeks pregnant.  Please call.  Thank you.

## 2024-04-12 NOTE — TELEPHONE ENCOUNTER
I called the patient. There were some openings tomorrow so booked appt tomorrow morning.   Provided office address, parking directions.   Labs are available in epic

## 2024-04-12 NOTE — TELEPHONE ENCOUNTER
Please add on this Wednesday before 2 PM.  Or Monday with at lunch I can room myself if needed thanks.

## 2024-04-13 ENCOUNTER — LAB ENCOUNTER (OUTPATIENT)
Dept: LAB | Facility: HOSPITAL | Age: 34
End: 2024-04-13
Attending: FAMILY MEDICINE
Payer: COMMERCIAL

## 2024-04-13 ENCOUNTER — OFFICE VISIT (OUTPATIENT)
Dept: ENDOCRINOLOGY CLINIC | Facility: CLINIC | Age: 34
End: 2024-04-13
Payer: COMMERCIAL

## 2024-04-13 VITALS
HEIGHT: 69.02 IN | DIASTOLIC BLOOD PRESSURE: 71 MMHG | BODY MASS INDEX: 25.48 KG/M2 | HEART RATE: 89 BPM | SYSTOLIC BLOOD PRESSURE: 114 MMHG | WEIGHT: 172 LBS

## 2024-04-13 DIAGNOSIS — O99.280 DISORDER OF THYROID, ANTEPARTUM (HCC): ICD-10-CM

## 2024-04-13 DIAGNOSIS — E07.9 DISORDER OF THYROID, ANTEPARTUM (HCC): Primary | ICD-10-CM

## 2024-04-13 DIAGNOSIS — O99.280 DISORDER OF THYROID, ANTEPARTUM (HCC): Primary | ICD-10-CM

## 2024-04-13 DIAGNOSIS — E07.9 DISORDER OF THYROID, ANTEPARTUM (HCC): ICD-10-CM

## 2024-04-13 LAB — T3FREE SERPL-MCNC: 2.94 PG/ML (ref 2.4–4.2)

## 2024-04-13 PROCEDURE — 36415 COLL VENOUS BLD VENIPUNCTURE: CPT

## 2024-04-13 PROCEDURE — 99203 OFFICE O/P NEW LOW 30 MIN: CPT | Performed by: INTERNAL MEDICINE

## 2024-04-13 PROCEDURE — 84481 FREE ASSAY (FT-3): CPT

## 2024-04-13 NOTE — H&P
New Patient Evaluation - History and Physical    CONSULT - Reason for Visit:  Low TSH during pregnancy  Requesting Provider: Anu Howe CNM    CHIEF COMPLAINT:    Chief Complaint   Patient presents with    Consult     Pt is in to establish care with Endocrinologist on Thyroid        HISTORY OF PRESENT ILLNESS:   Corinne Macnichol is a 33 year old female who presents for evaluation of low TSH during pregnancy  ( 12.5 weeks)   Noted on labs  This is her second pregnancy, TSH was not checked during first pregnancy   She has a h/o miscarriage before the birth of her first child    No prior h/o thyroid problems and no h/o being on thyroid medication   She reports nausea, vomiting during this pregnancy . This is starting to get better     FH of thyroid problems: father has hypothyroidism    PAST MEDICAL HISTORY:   Past Medical History:    Amenorrhea    irreg cycles    Anxiety    2016 therapy & meds    Asthma (HCC)    Depression    therapy & meds since 2016    Fibroids    found on fertility u/s    Heart murmur    as a child. resolved now    History of chicken pox    at age 2    Hyperlipidemia    borderline     Infertility, female    IUI w/ clomid       PAST SURGICAL HISTORY:   Past Surgical History:   Procedure Laterality Date    D & c      [place] teeth removed      2012       CURRENT MEDICATIONS:    Current Outpatient Medications   Medication Sig Dispense Refill    folic acid 1 MG Oral Tab Take 1 tablet (1 mg total) by mouth daily.      ondansetron (ZOFRAN) 4 mg tablet Take 1 tablet (4 mg total) by mouth every 8 (eight) hours as needed for Nausea. 30 tablet 0    sertraline (ZOLOFT) 50 MG Oral Tab Take 1 tablet (50 mg total) by mouth every morning. 90 tablet 1    buPROPion ER (WELLBUTRIN XL) 150 MG Oral Tablet 24 Hr Take 1 tablet (150 mg total) by mouth nightly. 90 tablet 1    sertraline 50 MG Oral Tab Take 1 tablet (50 mg total) by mouth daily. 90 tablet 1       ALLERGIES:  No Known Allergies    SOCIAL HISTORY:    Social  History     Socioeconomic History    Marital status:      Spouse name: Peewee Mesa    Number of children: 0    Years of education: 18    Highest education level: Master's degree (e.g., MA, MS, Jocelyn, MEd, MSW, LUIS)   Occupational History    Occupation: [employer]    Tobacco Use    Smoking status: Never    Smokeless tobacco: Never   Vaping Use    Vaping status: Never Used   Substance and Sexual Activity    Alcohol use: Not Currently     Comment: weekly    Drug use: Never   Other Topics Concern    Caffeine Concern No    Exercise No    Seat Belt Yes    Special Diet Yes     Comment: lactose free    Stress Concern No     Service No       FAMILY HISTORY:   Family History   Problem Relation Age of Onset    Thyroid disease Father     Other (Other) Father     Diabetes Brother     Hypertension Mother     Other (Other) Mother         cataracts    COPD Maternal Grandmother     Heart Disorder Maternal Grandmother     Cancer Maternal Grandfather         prostate    Other (Other) Maternal Grandfather         emphysema    Cancer Paternal Grandmother         brain       ASSESSMENTS:     REVIEW OF SYSTEMS:  Constitutional: Negative for: weight change, fever, fatigue, cold/heat intolerance  Eyes: Negative for:  Visual changes, proptosis, blurring  ENT: Negative for:  dysphagia, neck swelling, dysphonia  Respiratory: Negative for:  dyspnea, cough  Cardiovascular: Negative for:  chest pain, palpitations, orthopnea  GI: Negative for:  abdominal pain, nausea, vomiting, diarrhea, constipation, bleeding  Neurology: Negative for: headache, numbness, weakness  Genito-Urinary: Negative for: dysuria, frequency  Psychiatric: Negative for:  depression, anxiety  Hematology/Lymphatics: Negative for: bruising, lower extremity edema  Endocrine: Negative for: polyuria, polydypsia  Skin: Negative for: rash, blister, cellulitis,      PHYSICAL EXAM:   Vitals:    04/13/24 1016   BP: 114/71   Pulse: 89   Weight: 172 lb (78 kg)   Height: 5'  9.02\" (1.753 m)     BMI: Body mass index is 25.39 kg/m².         General Appearance:  alert, well developed, in no acute distress  Head: Atraumatic  Eyes:  normal conjunctivae, sclera., normal sclera and normal pupils  Throat/Neck: normal sound to voice. Normal hearing, normal speech, no significant thyroid enlargement noted  Respiratory:  Speaking in full sentences, non-labored. no increased work of breathing, no audible wheezing    Neuro: motor grossly intact, moving all extremities without difficulty  Psychiatric:  oriented to time, self, and place  Extremities: no obvious extremity swelling, no lesions        DATA:     Pertinent data reviewed      ASSESSMENT AND PLAN:    33 year old who is 12.5 weeks pregnant with low TSH   FT4 is normal   Will check a FT3 level   Clinically, no specific symptoms of hyperthyroidism  She does report a h/o pregnancy related nausea, vomiting     She could have hcg mediated hyperthyroidism  During normal pregnancy, serum human chorionic gonadotropin (hCG) concentrations rise soon after fertilization and peak at 10 to 12 weeks gestation, after which time the levels decline. There is considerable homology between the beta-subunits of hCG and TSH. As a result, hCG has weak thyroid-stimulating activity and may cause hyperthyroidism during the period of highest serum hCG concentrations.     She also a FH of thyroid disease. Will check a TSI level    Further management will be based on results.  Patient to call if she develops symptoms of hyperthyroidism that were discussed with her including unintentional weight loss, anxiety, diarrhea, heart racing, irritability, lack of sleep.    Patient verbalized a complete  understanding of all of the above and did not have any further questions.             Orders Placed This Encounter   Procedures    Free T3 (Triiodothryronine)    Thyroid Stimulating Immunoglob         4/13/2024  Olya Christian MD

## 2024-04-14 LAB
GESTATIONAL AGE > OR = 9W:: YES
MONOSOMY X (TURNER SYNDROME): NOT DETECTED
TEST RESULT: NEGATIVE
TRISOMY 13 (PATAU SYNDROME): NEGATIVE
TRISOMY 18 (EDWARDS SYNDROME): NEGATIVE
TRISOMY 21 (DOWN SYNDROME): NEGATIVE
XXX (TRIPLE X SYNDROME): NOT DETECTED
XXY (KLINEFELTER SYNDROME): NOT DETECTED
XYY (JACOBS SYNDROME): NOT DETECTED

## 2024-04-15 DIAGNOSIS — O99.280 DISORDER OF THYROID, ANTEPARTUM (HCC): Primary | ICD-10-CM

## 2024-04-15 DIAGNOSIS — E07.9 DISORDER OF THYROID, ANTEPARTUM (HCC): Primary | ICD-10-CM

## 2024-04-16 ENCOUNTER — TELEPHONE (OUTPATIENT)
Dept: OBGYN CLINIC | Facility: CLINIC | Age: 34
End: 2024-04-16

## 2024-04-16 NOTE — TELEPHONE ENCOUNTER
----- Message from Anu Howe CNM sent at 4/15/2024 11:57 PM CDT -----  Please notify patient.  ----- Message -----  From: John Oh DO  Sent: 4/15/2024   8:11 AM CDT  To: Ludmila Villalobos RN; Anu Howe CNM    Low risk [id number] test

## 2024-04-23 PROBLEM — Z91.89 AT RISK FOR POSTPARTUM DEPRESSION: Status: ACTIVE | Noted: 2024-04-23

## 2024-04-23 PROBLEM — F41.9 ANXIETY DURING PREGNANCY (HCC): Status: ACTIVE | Noted: 2024-04-23

## 2024-04-23 PROBLEM — O99.340 ANXIETY DURING PREGNANCY (HCC): Status: ACTIVE | Noted: 2024-04-23

## 2024-04-30 ENCOUNTER — LAB ENCOUNTER (OUTPATIENT)
Dept: LAB | Age: 34
End: 2024-04-30
Attending: INTERNAL MEDICINE
Payer: COMMERCIAL

## 2024-04-30 DIAGNOSIS — O99.280 DISORDER OF THYROID, ANTEPARTUM (HCC): ICD-10-CM

## 2024-04-30 DIAGNOSIS — E07.9 DISORDER OF THYROID, ANTEPARTUM (HCC): ICD-10-CM

## 2024-04-30 DIAGNOSIS — E07.9 THYROID DISEASE AFFECTING PREGNANCY (HCC): Primary | ICD-10-CM

## 2024-04-30 DIAGNOSIS — O99.280 THYROID DISEASE AFFECTING PREGNANCY (HCC): Primary | ICD-10-CM

## 2024-04-30 LAB — TSI SER-ACNC: 0.91 MIU/ML (ref 0.55–4.78)

## 2024-04-30 PROCEDURE — 84443 ASSAY THYROID STIM HORMONE: CPT

## 2024-04-30 PROCEDURE — 36415 COLL VENOUS BLD VENIPUNCTURE: CPT

## 2024-05-02 LAB — THY STIM IMMUNO: <0.1 IU/L

## 2024-05-06 ENCOUNTER — ROUTINE PRENATAL (OUTPATIENT)
Dept: OBGYN CLINIC | Facility: CLINIC | Age: 34
End: 2024-05-06
Payer: COMMERCIAL

## 2024-05-06 VITALS
DIASTOLIC BLOOD PRESSURE: 82 MMHG | SYSTOLIC BLOOD PRESSURE: 128 MMHG | HEART RATE: 84 BPM | WEIGHT: 174 LBS | BODY MASS INDEX: 26 KG/M2

## 2024-05-06 DIAGNOSIS — O09.891 MEDICATION EXPOSURE DURING FIRST TRIMESTER OF PREGNANCY (HCC): Primary | ICD-10-CM

## 2024-05-06 NOTE — PROGRESS NOTES
No FM yet. Had normal genetic testing  / mfm consult. Seeing Psych for med management. Reviewed [employer] signs level II entered. Feels stable on meds.

## 2024-06-06 ENCOUNTER — PATIENT MESSAGE (OUTPATIENT)
Dept: PERINATAL CARE | Facility: HOSPITAL | Age: 34
End: 2024-06-06

## 2024-06-07 ENCOUNTER — HOSPITAL ENCOUNTER (OUTPATIENT)
Dept: PERINATAL CARE | Facility: HOSPITAL | Age: 34
Discharge: HOME | End: 2024-06-07
Attending: ADVANCED PRACTICE MIDWIFE
Payer: COMMERCIAL

## 2024-06-07 ENCOUNTER — HOSPITAL ENCOUNTER (OUTPATIENT)
Dept: PERINATAL CARE | Facility: HOSPITAL | Age: 34
Discharge: HOME | End: 2024-06-07
Attending: OBSTETRICS & GYNECOLOGY
Payer: COMMERCIAL

## 2024-06-07 VITALS
HEART RATE: 72 BPM | SYSTOLIC BLOOD PRESSURE: 124 MMHG | WEIGHT: 177 LBS | BODY MASS INDEX: 26 KG/M2 | DIASTOLIC BLOOD PRESSURE: 68 MMHG

## 2024-06-07 DIAGNOSIS — F32.A: ICD-10-CM

## 2024-06-07 DIAGNOSIS — O99.340 ANXIETY DURING PREGNANCY (HCC): ICD-10-CM

## 2024-06-07 DIAGNOSIS — F41.9 ANXIETY DURING PREGNANCY (HCC): ICD-10-CM

## 2024-06-07 DIAGNOSIS — O09.891 MEDICATION EXPOSURE DURING FIRST TRIMESTER OF PREGNANCY (HCC): ICD-10-CM

## 2024-06-07 DIAGNOSIS — O09.522 MULTIGRAVIDA OF ADVANCED MATERNAL AGE IN SECOND TRIMESTER (HCC): Primary | ICD-10-CM

## 2024-06-07 DIAGNOSIS — Z36.89 ENCOUNTER FOR FETAL ANATOMIC SURVEY (HCC): ICD-10-CM

## 2024-06-07 DIAGNOSIS — O99.342: ICD-10-CM

## 2024-06-07 DIAGNOSIS — O09.891 MEDICATION EXPOSURE, 1ST TRIMESTER (HCC): ICD-10-CM

## 2024-06-07 PROCEDURE — 76811 OB US DETAILED SNGL FETUS: CPT | Performed by: OBSTETRICS & GYNECOLOGY

## 2024-06-07 NOTE — PROGRESS NOTES
Reason for Consult:   Dear Ms. Rojas,    Thank you for requesting ultrasound evaluation and maternal fetal medicine consultation on Corinne Denise Macnichol.  As you are aware she is a 33 year old female with a Bruce pregnancy with Estimated Date of Delivery: 10/21/24 ..  A maternal-fetal medicine f/u is today..  Her prenatal records and labs were reviewed.    Doing well on wellbutrin and zoloft  Review of History:     OB History:    OB History    Para Term  AB Living   3 1 1 0 1 1   SAB IAB Ectopic Multiple Live Births   1 0 0 0 1      # Outcome Date GA Lbr Castro/2nd Weight Sex Type Anes PTL Lv   3 Current            2 Term 22 39w0d 11:28 / 03:03 7 lb 4.1 oz (3.29 kg) M NORMAL SPONT EPI N MELVIN      Complications: Variable decelerations, Late decelerations      Name: BERTO SANCHEZ      Apgar1: 8  Apgar5: 9   1 SAB 2020 8w0d             Birth Comments: D&C 2020             Allergies:  No Known Allergies   Current Meds:  Current Outpatient Medications   Medication Sig Dispense Refill    folic acid 1 MG Oral Tab Take 1 tablet (1 mg total) by mouth daily.      ondansetron (ZOFRAN) 4 mg tablet Take 1 tablet (4 mg total) by mouth every 8 (eight) hours as needed for Nausea. 30 tablet 0    sertraline (ZOLOFT) 50 MG Oral Tab Take 1 tablet (50 mg total) by mouth every morning. 90 tablet 1    buPROPion ER (WELLBUTRIN XL) 150 MG Oral Tablet 24 Hr Take 1 tablet (150 mg total) by mouth nightly. 90 tablet 1        HISTORY:  Past Medical History:    Amenorrhea    irreg cycles    Anxiety    2016 therapy & meds    Asthma (HCC)    Depression    therapy & meds since 2016    Fibroids    found on fertility u/s    Heart murmur    as a child. resolved now    History of chicken pox    at age 2    Hyperlipidemia    borderline     Infertility, female    IUI w/ clomid      Past Surgical History:   Procedure Laterality Date    D & c      [place] teeth removed            Family History   Problem Relation Age  of Onset    Thyroid disease Father     Other (Other) Father     Diabetes Brother     Hypertension Mother     Other (Other) Mother         cataracts    COPD Maternal Grandmother     Heart Disorder Maternal Grandmother     Cancer Maternal Grandfather         prostate    Other (Other) Maternal Grandfather         emphysema    Cancer Paternal Grandmother         brain      Social History     Socioeconomic History    Marital status:      Spouse name: Peewee Mesa    Number of children: 0    Years of education: 18    Highest education level: Master's degree (e.g., MA, MS, Jocelyn, MEd, MSW, LUIS)   Occupational History    Occupation: lawfirm    Tobacco Use    Smoking status: Never    Smokeless tobacco: Never   Vaping Use    Vaping status: Never Used   Substance and Sexual Activity    Alcohol use: Not Currently     Comment: weekly    Drug use: Never   Other Topics Concern    Caffeine Concern No    Exercise No    Seat Belt Yes    Special Diet Yes     Comment: lactose free    Stress Concern No     Service No   Social History Narrative    The patient does not use an assistive device..      The patient does live in a home with stairs.     Social Determinants of Health     Financial Resource Strain: Low Risk  (3/20/2024)    Financial Resource Strain     Difficulty of Paying Living Expenses: Not hard at all     Med Affordability: No   Food Insecurity: No Food Insecurity (3/20/2024)    Food Insecurity     Food Insecurity: Never true   Transportation Needs: No Transportation Needs (3/20/2024)    Transportation Needs     Lack of Transportation: No   Stress: No Stress Concern Present (3/20/2024)    Stress     Feeling of Stress : No   Housing Stability: Low Risk  (3/20/2024)    Housing Stability     Housing Instability: No        NARRATIVE:     /68   Pulse 72   Wt 177 lb (80.3 kg)   LMP 01/19/2024 (Exact Date)   BMI 26.13 kg/m²           Alert and Oriented.  No acute distress          Abdomen:  soft, nontender,  no contractions noted.              DISCUSSION  During her visit we discussed and reviewed the following issues:  ADVANCED MATERNAL AGE    Background  I reviewed with the patient that pregnancies in women of advanced maternal age (35 or older at delivery) are associated with elevated risks. Specifically, there is a higher rate of:  Fetal malformations  Preeclampsia  Gestational diabetes  Intrauterine fetal death   Please see previous M detailed discussion.                   We discussed what is known about the safety of selective serotonin receptor inhibitors (SSRI) in pregnancy.    An increased risk of teratogenic effects, including cardiovascular defects, may be associated with maternal use of sertraline or other SSRIs.   Nonteratogenic effects that may been seen in the  period include respiratory distress, cyanosis, apnea, siezures, temperature instability, feeding difficulty, vomiting, hypoglycemia, increased or decreased tone, irritability, crying, hyper-reflexia, and jitteriness or tremor.   Exposure to SSRIs late in pregnancy has also been associated with persistent pulmonary hypertension of the .   Adverse effects may be due to toxic effects of the SSRI or drug withdrawal due to discontinuation. The long-term effects of in utero SSRI exposure on infant development and behavior are not known.           Due to pregnancy-induced physiologic changes, women who are pregnant may require increased doses of paroxetine to achieve euthymia. Women treated for major depression and who are euthymic prior to pregnancy are more likely to experience a relapse when medication is discontinued as compared to pregnant women who continue taking antidepressant medications. The ACOG recommends that therapy with SSRIs or SNRIs during pregnancy be individualized; treatment of depression during pregnancy should incorporate the clinical expertise of the mental health clinician, obstetrician, primary healthcare  provider, and pediatrician (ACOG, 2007). The ACOG also recommend that therapy with paroxetine(Paxil) be avoided during pregnancy if possible and that fetuses exposed in early pregnancy be assessed with a fetal echocardiography.             If treatment during pregnancy is required, tapering therapy is not advised during the third trimester despite the potential for withdrawal symptoms in the infant. Other evidence supports that inadequately treated maternal mood disorders has other effects on  behavior that can be related to impaired mother-infant bonding from inadequately treated depression/anxiety. Although this outcome is more difficult to measure, the repercussions can be significant. In addition, even with immediate use of SSRI's after delviery, given their prolonged half-life, clinical effectiveness can take several weeks. This exposes the patient to potentially significant risks of exacerbation of her mood disorder at a particularly vulnerable time. This further emphasizes the need for an individualized approach to care.     Bupropion and its metabolites were found to cross the placenta in in vitro studies (Brian, 2012).  Adverse events have been observed in some animal reproduction studies, making it a class C medication.   An increased risk of congenital malformations has not been observed following maternal of use of bupropion during pregnancy; however, data specific to cardiovascular malformations is inconsistent. The long-term effects on development and behavior have not been studied.     The ACOG recommends that antidepressant therapy during pregnancy be individualized; treatment of depression during pregnancy should incorporate the clinical expertise of the mental health clinician, obstetrician, primary healthcare provider, and pediatrician. According to the American Psychiatric Association (APA), the risks of medication treatment should be weighed against other treatment options and  untreated depression. For women who discontinue antidepressant medications during pregnancy and who may be at high risk for postpartum depression, the medications can be restarted following delivery. Treatment algorithms have been developed by the ACOG and the APA for the management of depression in women prior to conception and during pregnancy (ACOG, 2008; APA, 2010; [place], 2009). There is insufficient information related to the use of bupropion to recommend use in pregnancy (ACOG, 2010).          OB ULTRASOUND REPORT   See imaging tab for complete ultrasound report       Level II US performed showing size equal to dates.  Ultrasound Findings:  Single IUP in variable presentation.    Placenta is posterior.   A 3 vessel cord is noted.  Cardiac activity is present at 160 bpm   g ( 0 lb 13 oz);   MVP is 5 cm .     The fetal measurements are consistent with the established EDC. No ultrasound evidence of structural abnormalities are seen today. The nasal bone is present. No ultrasound evidence of markers for aneuploidy are seen. She understands that ultrasound exam cannot exclude genetic abnormalities and that genetic testing is recommended. The limitations of ultrasound were discussed.     Uterus and adnexa appeared normal  today on US      IMPRESSION:   1. IUP @  20w4d   2. Scan consistent with dates  3. No fetal structural abnormalities seen  4.  AMA---NIPT low risk, declines invasive testing   5.  Depression in pregnancy      RECOMMENDATIONS:   1.  Weekly NST at 36wks  2.  Growth US at 32wks  3.  Continue zoloft and wellbutrin under care of her mental health team.        Thank you for allowing me to participate in the care of your patient.  Please do not hesitate to call with any questions or concerns.     Total time spent   20  minutes this calendar day which includes preparing to see the patient including chart review, obtaining and/or reviewing additional medical history, performing a physical exam and  evaluation, documenting clinical information in the electronic medical record, independently interpreting results, counseling the patient, communicating results to the patient/family/caregiver and coordinating care.    Mary Fisher MD   Maternal Fetal Medicine     Note to patient and family:  The 21st Century Cures Act makes medical notes available to patients in the interest of transparency.  However, please be advised that this is a medical document.  It is intended as a peer to peer communication.  It is written in medical language and may contain abbreviations or verbiage that are technical and unfamiliar.  It may appear blunt or direct.  Medical documents are intended to carry relevant information, facts as evident, and the clinical opinion of the practitioner.

## 2024-06-09 PROBLEM — F33.41 RECURRENT MAJOR DEPRESSION IN PARTIAL REMISSION (HCC): Status: RESOLVED | Noted: 2022-12-15 | Resolved: 2024-06-09

## 2024-06-09 NOTE — PROGRESS NOTES
Patient's last menstrual period was 2024 (exact date). 10/21/2024, by Ultrasound 21w1d here today for ERICKA visit. Denies cramping, LOF or bleeding.     Pt stated she was slightly light-headed 5 days ago while in elevator. Encouraged to drink more fluids and eat protein rich snacks.    Next visit in 4 weeks    Labs-UTD  Low risk NIPT  AFP- Declines    Ultrasound- - , 3VC, posterior placenta, normal anatomy, CL 41.9 mm        ICD-10-CM    1. Prenatal care in second trimester (HCC)  Z34.92             Warning signs reviewed

## 2024-06-11 ENCOUNTER — ROUTINE PRENATAL (OUTPATIENT)
Dept: OBGYN CLINIC | Facility: CLINIC | Age: 34
End: 2024-06-11
Payer: COMMERCIAL

## 2024-06-11 VITALS
BODY MASS INDEX: 26 KG/M2 | HEART RATE: 81 BPM | DIASTOLIC BLOOD PRESSURE: 82 MMHG | WEIGHT: 177.63 LBS | SYSTOLIC BLOOD PRESSURE: 118 MMHG

## 2024-06-11 DIAGNOSIS — Z34.92 PRENATAL CARE IN SECOND TRIMESTER (HCC): Primary | ICD-10-CM

## 2024-06-11 RX ORDER — FAMOTIDINE 20 MG/1
20 TABLET, FILM COATED ORAL AS NEEDED
COMMUNITY

## 2024-06-19 ENCOUNTER — LAB ENCOUNTER (OUTPATIENT)
Dept: LAB | Age: 34
End: 2024-06-19
Attending: INTERNAL MEDICINE

## 2024-06-19 DIAGNOSIS — O99.280 THYROID DISEASE AFFECTING PREGNANCY (HCC): ICD-10-CM

## 2024-06-19 DIAGNOSIS — E07.9 THYROID DISEASE AFFECTING PREGNANCY (HCC): ICD-10-CM

## 2024-06-19 LAB — TSI SER-ACNC: 0.82 MIU/ML (ref 0.55–4.78)

## 2024-06-19 PROCEDURE — 36415 COLL VENOUS BLD VENIPUNCTURE: CPT

## 2024-06-19 PROCEDURE — 84443 ASSAY THYROID STIM HORMONE: CPT

## 2024-07-08 ENCOUNTER — ROUTINE PRENATAL (OUTPATIENT)
Dept: OBGYN CLINIC | Facility: CLINIC | Age: 34
End: 2024-07-08
Payer: COMMERCIAL

## 2024-07-08 VITALS — DIASTOLIC BLOOD PRESSURE: 78 MMHG | SYSTOLIC BLOOD PRESSURE: 114 MMHG | BODY MASS INDEX: 27 KG/M2 | WEIGHT: 181.63 LBS

## 2024-07-08 DIAGNOSIS — Z34.92 PRENATAL CARE, SECOND TRIMESTER (HCC): Primary | ICD-10-CM

## 2024-07-08 NOTE — PROGRESS NOTES
Feeling well. Endorses regular fetal movement. Denies contractions, LOF, vaginal bleeding.     Has been having some round ligament pain. Recommend support belt. If no improvement consider PT.    Plan:  1hr GTT and CBC between 26-28 weeks  ERICKA 3 weeks    Reviewed second trimester warning signs and when to call.

## 2024-07-19 ENCOUNTER — LAB ENCOUNTER (OUTPATIENT)
Dept: LAB | Age: 34
End: 2024-07-19
Attending: ADVANCED PRACTICE MIDWIFE
Payer: COMMERCIAL

## 2024-07-19 DIAGNOSIS — Z34.92 PRENATAL CARE, SECOND TRIMESTER (HCC): ICD-10-CM

## 2024-07-19 LAB
DEPRECATED RDW RBC AUTO: 37.6 FL (ref 35.1–46.3)
ERYTHROCYTE [DISTWIDTH] IN BLOOD BY AUTOMATED COUNT: 12.5 % (ref 11–15)
GLUCOSE 1H P GLC SERPL-MCNC: 113 MG/DL
HCT VFR BLD AUTO: 32 %
HGB BLD-MCNC: 10.7 G/DL
MCH RBC QN AUTO: 27.6 PG (ref 26–34)
MCHC RBC AUTO-ENTMCNC: 33.4 G/DL (ref 31–37)
MCV RBC AUTO: 82.5 FL
PLATELET # BLD AUTO: 260 10(3)UL (ref 150–450)
RBC # BLD AUTO: 3.88 X10(6)UL
WBC # BLD AUTO: 8.9 X10(3) UL (ref 4–11)

## 2024-07-19 PROCEDURE — 85027 COMPLETE CBC AUTOMATED: CPT

## 2024-07-19 PROCEDURE — 36415 COLL VENOUS BLD VENIPUNCTURE: CPT

## 2024-07-19 PROCEDURE — 82950 GLUCOSE TEST: CPT

## 2024-07-22 PROBLEM — O99.019 ANTEPARTUM ANEMIA (HCC): Status: ACTIVE | Noted: 2024-07-22

## 2024-07-22 RX ORDER — FERROUS SULFATE 325(65) MG
325 TABLET ORAL EVERY OTHER DAY
Qty: 30 TABLET | Refills: 2 | Status: SHIPPED | OUTPATIENT
Start: 2024-07-22

## 2024-07-29 PROBLEM — O09.523 AMA (ADVANCED MATERNAL AGE) MULTIGRAVIDA 35+, THIRD TRIMESTER (HCC): Status: ACTIVE | Noted: 2024-07-29

## 2024-07-29 NOTE — PROGRESS NOTES
Corinne Denise Macnichol is a 33 year old  pt at 28w1d here for ERICKA  She is feeling alright. Feeling a lot more trae dimas but does not believe 4-5/hr.   Have muscle cramps in the evening, increasing electrolytes and taking a magnesium sulfate  Using inhaler just as needed. Exercise induced  Has not been taking iron every other day b/c makes nauseous and constipated. Was taking OTC daily.   Taking Zoloft and Wellbutrin XL daily. Seeing Psychiatrist for management of meds      ROS: Frequent BH/tightness. Not painful. Denies bleeding, leaking of fluid.  Fetus is active.    Vitals:    24 1341   BP: 122/84   Pulse: 82   Weight: 183 lb (83 kg)      See flowsheet  TW lbs (15-25 lbs)  3T Hgb 10.7 on , 1 hr OGTT 113      Assessment/Plan:  IUP at 28w1d  1. Prenatal care in third trimester (HCC)  - TETANUS, DIPHTHERIA TOXOIDS AND ACELLULAR PERTUSIS VACCINE (TDAP), >7 YEARS, IM USE  - HIV AG AB Combo; Future  - TREP; Future    2. AMA (advanced maternal age) multigravida 35+, third trimester ([facility])    3. Anxiety during pregnancy ([facility])    4. Moderate episode of recurrent major depressive disorder ([facility])    5. Antepartum anemia (HCC)  - CBC, Platelet; No Differential; Future    6. Low TSH level  - TSH W Reflex To Free T4; Future       Reviewed:  Recommendations and rationale for Tdap vaccine(s) in pregnancy- desires today  /Labor precautions  Kick counts  Danger Signs/PreE s/s  Growth US at 32 wks scheduled   CBC, HIV/Trep in 2 wks  To triage for r/o PTL  RTC 2 wk(s)    I have spent 20 minutes total time on the day of the encounter, including: preparing to see the patient, ordering/reviewing labs, performing a medically appropriate exam, and providing care coordination. face to face counseling, chart review, orders and coordination of care     Pt verbalized understanding.  All questions answered.  No barriers to learning identified

## 2024-07-30 ENCOUNTER — HOSPITAL ENCOUNTER (OUTPATIENT)
Facility: HOSPITAL | Age: 34
Discharge: HOME | End: 2024-07-30
Attending: ADVANCED PRACTICE MIDWIFE | Admitting: OBSTETRICS & GYNECOLOGY
Payer: COMMERCIAL

## 2024-07-30 ENCOUNTER — ROUTINE PRENATAL (OUTPATIENT)
Dept: OBGYN CLINIC | Facility: CLINIC | Age: 34
End: 2024-07-30
Payer: COMMERCIAL

## 2024-07-30 VITALS
HEART RATE: 82 BPM | BODY MASS INDEX: 27 KG/M2 | SYSTOLIC BLOOD PRESSURE: 122 MMHG | WEIGHT: 183 LBS | DIASTOLIC BLOOD PRESSURE: 84 MMHG

## 2024-07-30 DIAGNOSIS — Z34.93 PRENATAL CARE IN THIRD TRIMESTER (HCC): Primary | ICD-10-CM

## 2024-07-30 DIAGNOSIS — R79.89 LOW TSH LEVEL: ICD-10-CM

## 2024-07-30 DIAGNOSIS — O09.523 AMA (ADVANCED MATERNAL AGE) MULTIGRAVIDA 35+, THIRD TRIMESTER (HCC): ICD-10-CM

## 2024-07-30 DIAGNOSIS — O99.340 ANXIETY DURING PREGNANCY (HCC): ICD-10-CM

## 2024-07-30 DIAGNOSIS — F33.1 MODERATE EPISODE OF RECURRENT MAJOR DEPRESSIVE DISORDER (HCC): ICD-10-CM

## 2024-07-30 DIAGNOSIS — O99.019 ANTEPARTUM ANEMIA (HCC): ICD-10-CM

## 2024-07-30 DIAGNOSIS — F41.9 ANXIETY DURING PREGNANCY (HCC): ICD-10-CM

## 2024-07-30 PROBLEM — O26.899 PELVIC PAIN IN PREGNANCY (HCC): Status: ACTIVE | Noted: 2024-07-30

## 2024-07-30 PROBLEM — R10.2 PELVIC PAIN IN PREGNANCY (HCC): Status: ACTIVE | Noted: 2024-07-30

## 2024-07-30 LAB
BILIRUB UR QL: NEGATIVE
FIBRONECTIN FETAL SPEC QL: NEGATIVE
GLUCOSE UR-MCNC: NORMAL MG/DL
HGB UR QL STRIP.AUTO: NEGATIVE
KETONES UR-MCNC: NEGATIVE MG/DL
LEUKOCYTE ESTERASE UR QL STRIP.AUTO: 75
NITRITE UR QL STRIP.AUTO: NEGATIVE
PH UR: 5.5 [PH] (ref 5–8)
PROT UR-MCNC: NEGATIVE MG/DL
SP GR UR STRIP: 1.01 (ref 1–1.03)
UROBILINOGEN UR STRIP-ACNC: NORMAL

## 2024-07-30 PROCEDURE — 90471 IMMUNIZATION ADMIN: CPT | Performed by: ADVANCED PRACTICE MIDWIFE

## 2024-07-30 PROCEDURE — 90715 TDAP VACCINE 7 YRS/> IM: CPT | Performed by: ADVANCED PRACTICE MIDWIFE

## 2024-07-30 PROCEDURE — 59025 FETAL NON-STRESS TEST: CPT | Performed by: ADVANCED PRACTICE MIDWIFE

## 2024-07-30 NOTE — TRIAGE
[facility]  part of [facility]      Triage Note    Corinne Denise Macnichol Patient Status:  Outpatient    10/5/1990 MRN [id number]   Location [facility] Attending Ana María Cannon CNM   Hosp Day # 0 PCP Aliyah Mckinney MD      Para:   Estimated Date of Delivery: 10/21/24  Gestation: 28w1d    Chief Complaint    R/o  Labor         Allergies:  No Known Allergies    Orders Placed This Encounter   Procedures    Urinalysis with Culture Reflex    FETAL FIBRONECTIN    Urine Culture, Routine    Vaginitis Vaginosis PCR Panel       Lab Results   Component Value Date    WBC 8.9 2024    HGB 10.7 (L) 2024    HCT 32.0 (L) 2024    .0 2024    CREATSERUM 0.74 2022    BUN 8 2022     (L) 2022    K 3.5 2022     2022    CO2 19.0 (L) 2022    GLU 83 2022    CA 9.4 2022    ALB 2.9 (L) 2022    ALKPHO 302 (H) 2022    BILT 0.5 2022    TP 7.4 2022    AST 26 2022    ALT 21 2022    T4F 1.3 2024    TSH 0.820 2024     2022    GGT 17 2022    FFN Negative 2024       Clinitek UA  Lab Results   Component Value Date    URCOLOR Yellow 2021    URCLA Clear 2021    GLUUR Normal 2024    URINEBILI Negative 2021    URINEKETONE 40 (A) 2021    SPECGRAVITY 1.007 2024    PHUR 6.0 2021    PROTURINE Negative 2021    UROBILI <2.0 2021    URINENITRITE Negative 2021    URINELEUK Negative 2021    URINECUL No Growth at 18-24 hrs. 2024       UA  Lab Results   Component Value Date    COLORUR Light-Yellow 2024    CLARITY Turbid (A) 2024    SPECGRAVITY 1.007 2024    PROUR Negative 2024    GLUUR Normal 2024    KETUR Negative 2024    BILUR Negative 2024    BLOODURINE Negative 2024    NITRITE Negative 2024    UROBILINOGEN  Normal 2024    LEUUR 75 (A) 2024    UASA Negative 2022       There were no vitals filed for this visit.    NST  Variability: Moderate           Accelerations: Yes           Decelerations: None            Baseline: 140 BPM           Uterine Irritability: Yes           Contractions: Not present                                        Acoustic Stimulator: No           Nonstress Test Interpretation: Appropriate for gestational age           Nonstress Test Second Interpretation: Appropriate for gestational age                        Additional Comments Comments:  at 28 1/7 c/o increased trae dimas. Denies lof or vag bleed. States good FM. SVE closed. FFN negative. Vaginosis culture pending. Davis MONAHAN reviewed the case and pt discharged ambulatory with verbal instructions including kick count, warning labor signs, when to call the MD.     Chief Complaint   Patient presents with    R/o  Labor         Joy HERRON RN  2024 5:14 PM    CNM Addendum  NST reviewed and reactive. No contractions present. Labs reviewed. Will await urine culture and vaginal culture and treat if indicated. Discussed likelihood of trae dimas contractions which are common and not concerning. Patient to call if having painful contractions or more then 6 in an hour.  Ana María Cannon CNM

## 2024-07-31 LAB
BV BACTERIA DNA VAG QL NAA+PROBE: NEGATIVE
C GLABRATA DNA VAG QL NAA+PROBE: NEGATIVE
C KRUSEI DNA VAG QL NAA+PROBE: NEGATIVE
CANDIDA DNA VAG QL NAA+PROBE: NEGATIVE
T VAGINALIS DNA VAG QL NAA+PROBE: NEGATIVE

## 2024-08-14 ENCOUNTER — ROUTINE PRENATAL (OUTPATIENT)
Dept: OBGYN CLINIC | Facility: CLINIC | Age: 34
End: 2024-08-14
Payer: COMMERCIAL

## 2024-08-14 VITALS
DIASTOLIC BLOOD PRESSURE: 78 MMHG | WEIGHT: 186 LBS | SYSTOLIC BLOOD PRESSURE: 125 MMHG | BODY MASS INDEX: 27 KG/M2 | HEART RATE: 82 BPM

## 2024-08-14 DIAGNOSIS — O09.523 AMA (ADVANCED MATERNAL AGE) MULTIGRAVIDA 35+, THIRD TRIMESTER (HCC): ICD-10-CM

## 2024-08-14 DIAGNOSIS — Z34.83 PRENATAL CARE, SUBSEQUENT PREGNANCY IN THIRD TRIMESTER (HCC): Primary | ICD-10-CM

## 2024-08-14 NOTE — PATIENT INSTRUCTIONS
Adapting to Pregnancy: Third Trimester    Although common during pregnancy, some discomforts may seem worse in the final weeks. Simple lifestyle changes can help. Take care of yourself. And ask your partner to help out with small tasks.  Limiting leg problems  Ways to combat leg issues:  Wear support hose all day.  Avoid snug shoes and clothes that bind, like tight pants and socks with elastic tops.  Sit with your feet and legs raised often.  Caring for your breasts  Tips to follow include:  Wash with plain water. Avoid using harsh soaps or rubbing alcohol. They may cause dryness.  Wear a nursing bra for extra support. It can also hide any leaks from your nipples.  Controlling hemorrhoids  Ways to avoid hemorrhoids include:  Eat foods that are high in fiber. Also, exercise and drink enough fluids. This will reduce constipation and hemorrhoids.  Sleep and nap on your side. This limits pressure on the veins of your rectum.  Try not to stand or sit for long periods.  Controlling back pain  As your body changes during pregnancy, your back must work in new ways. Back pain is due to many causes. Physical changes in your body can strain your back and its supporting muscles. Also, hormones (chemicals that carry messages throughout the body) increase during pregnancy. This can affect how your muscles and joints work together. All of these changes can lead to pain. Pain may be felt in the upper or lower back. Pain is also common in the pelvis. Some pregnant women have sciatica. This is pain caused by pressure on the sciatic nerve running down the back of the leg. Ask your healthcare provider for specific tips and exercises to help control your back pain.  Tips to help you rest  Good rest and sleep will help you feel better. Here are some ideas:  Ask your partner to massage your shoulders, neck, or back.  Limit the errands you do each day.  Lie down in the afternoon or after work for a few minutes.  Take a warm bath before  you go to sleep.  Drink warm milk or teas without caffeine.  Avoid coffee, black tea, and cola.  Stopping heartburn  Avoid spicy, greasy, fried, or acidic foods.  Eat small amounts more often. Eat slowly. Wait 2 hours after eating before lying down.  Sleep with your upper body raised 6 inches.   Managing mood swings  Ways to manage mood swings include:  Know that mood changes are normal.  Exercise often, but get plenty of rest.  Address any concerns and limit stress. Talking to your partner, other women, or your healthcare provider may help.  Dealing with urinary frequency  Tips to deal with having to urinate often include:  Drink plenty of water all day. If you drink a lot in the evening, though, you may have to get up more in the night.  Limit coffee, black tea, and cola.  [employer] last reviewed this educational content on 2/1/2018 © 2000-2020 The [employer]. [address] [place] [address]. All rights reserved. This information is not intended as a substitute for professional medical care. Always follow your healthcare professional's instructions.        Pregnancy: Your Third Trimester Changes  As the baby grows, your body changes too. You may also see signs that your body is getting ready for labor. Be patient. Within a few more weeks, your baby will be born.  How you are changing  Your body is preparing for the birth of your baby. Some of the most common changes are listed below. If you have any questions or concerns, ask your healthcare provider:  You’ll gain more weight from fluids, extra blood, and fat deposits.  Your breasts will grow as your body gets ready to feed the baby. They may be more tender. You may also notice a slight yellow or white discharge from the nipples.  Discharge from your vagina may increase. This is normal.  You might see some skin color changes on your forehead, cheeks, or nose. Most of these will go away after you deliver.  How your baby is growing       Month  7  Your baby can open and close his or her eyes and weighs around 4 pounds. If born prematurely (too early), your baby would likely survive with special care. Month 8  Your baby is building up body fat and weighs around 6 pounds. Month 9  Your baby weighs nearly 7 pounds and is about 19 to 21 inches long. In other words, any day now...   [employer] last reviewed this educational content on 2018 The [employer]. [address] [place] [address], [place] [address]. All rights reserved. This information is not intended as a substitute for professional medical care. Always follow your healthcare professional's instructions.   Understanding  Labor  Going into labor before your 37th week of pregnancy is called  labor.  labor can cause your baby to be born too soon. This can lead to a number of health problems that may affect your baby.      Before labor, the cervix is thick and closed.      In  labor, the cervix begins to efface (thin) and dilate (open).   Symptoms of  labor   If you think you’re having  labor, get medical help right away. Contractions alone don’t mean you’re in  labor. What matters more are changes in your cervix (the lower end of the uterus). Symptoms of  labor include:   Four or more contractions per hour  Strong contractions  Constant menstrual-like cramping  Low-back pain  Mucous or bloody vaginal discharge  Bleeding or spotting in the second or third trimester  Evaluating  labor   Your healthcare provider will try to find out whether you’re in  labor or whether you’re just having contractions. He or she may watch you for a few hours. The following tests may be done:   Pelvic exam to see if your cervix has effaced (thinned) and dilated (opened)  Uterine activity monitoring to detect contractions  Fetal monitoring to check the health of your baby  Ultrasound to check your baby’s size and position  Amniocentesis to  check how mature your baby’s lungs are  Caring for yourself at home   If you have  contractions, but your cervix is still thick and closed, your healthcare provider may ask you to do the following at home:   Drink plenty of water.  Do fewer activities.  Rest in bed on your side.  Don't have intercourse or nipple stimulation.  When to call your healthcare provider   Call your healthcare provider if you notice any of these:   Four or more contractions per hour  Bag of water breaks  Bleeding or spotting  If you need hospital care    labor often requires that you have hospital care and complete bed rest. You may have an IV (intravenous) line to get fluids. You may be given pills or injections to help prevent contractions. Finally, you may get medicine (corticosteroids) that helps your baby’s lungs mature more quickly.   Are you at risk?   Any pregnant woman can have  labor. It may start for no reason. But these risk factors can increase your chances:   Past  labor or past early birth  Smoking, drug, or alcohol use during pregnancy  Multiple fetuses (twins or more)  Problems with the shape of the uterus  Bleeding during the pregnancy  The dangers of  birth   A baby born too soon may have health problems. This is because the baby didn’t have enough time to mature. Some of the risks for your baby include:   Not breastfeeding or feeding well  Having immature lungs  Bleeding in the brain  Dying  Reaching term   Your goal is to get as close to term as you can before giving birth. The closer you get to term, the higher your chance of having a healthy baby. Work with your healthcare provider. Together, you can take steps that may keep you from giving birth too early.   [employer] last reviewed this educational content on 3/1/2019  © 3440-1441 [employer], [employer]. [address] [place] [address], [place] [address]. All rights reserved. This information is not intended as a substitute for  professional medical care. Always follow your healthcare professional's instructions.        Premature Labor    Premature labor ( labor) is when symptoms of labor occur before 37 weeks of pregnancy. (This is 3 weeks before your due date.) Premature labor can lead to premature delivery. This means giving birth to your baby early. Babies need at least 37 weeks of pregnancy for all the organs to develop normally. The earlier the delivery, the greater the risks to the baby.  In most cases, the cause of premature labor is unknown. But certain factors may make the problem more likely. These include:  History of premature labor with other pregnancies  Smoking  Alcohol or substance abuse  Low pre-pregnancy weight or weight gain during pregnancy  Short time period between pregnancies  Being pregnant with twins, triplets, or more  History of certain types of surgery on the cervix or uterus  Having a short cervix  Certain infections  There are a number of other risk factors. Ask your healthcare provider to help you understand the risk factors specific to your case. Then find out what you can do to control or reduce them.  Contractions are one of the main signs of premature labor. A contraction is different from cramping. It may feel painful and the belly (abdomen) may get hard. It can last from a few seconds to a few minutes. Some women may feel only a sense of pressure in the belly, thighs, rectum, or vagina. Some may feel only the hardening of the uterus without pain or pressure. Or there may be a constant pain in the lower back, which spreads forward toward the belly.Premature labor is often treated with medicines. A hospital stay may be needed. If labor doesn't progress and you and your baby are both healthy, you may be discharged to continue care at home.  Home care  Ask your provider any questions you have. Be certain you understand how to care for yourself at home. Also follow all recommendations given by your  healthcare providers.  Learn the signs of premature labor. Watch for these signs when you get home.  Limit or restrict activities as advised. This may include stopping certain physical activities and cutting back hours at work.  Avoid doing strenuous work as directed by your provider. Ask family and friends for help with tasks and support at home, if needed.  Don’t smoke, drink alcohol, or use other harmful substances.  Take steps to reduce stress.  Report any unusual symptoms to your provider.    Follow-up care  Follow up with your healthcare provider, or as directed. Weekly visits with your provider may be needed.  When to seek medical advice  Call your healthcare provider right away if any of these occur:  Regular or frequent contractions, whether they are painful or not  Pressure in the pelvis  Pressure in the lower belly or mild cramping in your belly with or without diarrhea  Constant low, dull backache  Gush or slow leaking of water from your vagina  Change in vaginal discharge (watery, mucus, or bloody)  Any vaginal bleeding  Decreased movement of your baby  [employer] last reviewed this educational content on 6/1/2018 © 2000-2020 The [employer]. [address] [place], [place] [id number]. All rights reserved. This information is not intended as a substitute for professional medical care. Always follow your healthcare professional's instructions.        Understanding Preeclampsia  Preeclampsia is high blood pressure (hypertension) that happens during pregnancy. It often shows up around the 20th week of pregnancy. It often goes back to normal by the 12th week after you give birth. It can lead to serious health risks for you and your baby. During your pregnancy, your healthcare provider will watch your blood pressure.    Symptoms  A common symptom of preeclampsia is high blood pressure. Other symptoms may include:  Rapid weight gain  Protein in your urine  Headache  Belly (abdominal) pain on your  right side  Vision problems. These include flashes or spots.  Swelling (edema) in your face or hands. This also often happens near the end of normal pregnancies, even without preeclampsia.  Tests you may have  Your healthcare provider will want to check your blood pressure throughout your pregnancy. If your blood pressure is high, you may have the following tests:  Urine tests to look for protein  Blood tests to confirm preeclampsia  Fetal monitoring to make sure that your baby is healthy  Treating preeclampsia  You may need to take a daily low dose of aspirin if you are at risk for preeclampsia. Preeclampsia almost always ends soon after you give birth. Until then, your healthcare provider can help manage your condition. If your symptoms are mild, you may need activity limits at home, including bed rest and no heavy lifting. If your symptoms are severe, you will stay in the hospital. Hospital treatment includes:  Activity limits to help control blood pressure. This means no heavy lifting and 8 hours per day lying down with the feet up.  Magnesium IV (intravenous) drip during labor to prevent seizures  Induced labor or surgical delivery by  section. Delivery is considered the cure for preeclampsia.  When to call your healthcare provider  Call your healthcare provider if swelling, weight gain, or other symptoms come on quickly or are severe. Some cases of preeclampsia are more severe than others. Your symptoms also may change or get worse as you get closer to your due date.  Who’s at risk?  No one knows what causes preeclampsia. Preeclampsia can happen in any pregnant woman. But it is more common in first-time pregnancies. Things that increase the risk include:  Previous pregnancies. You are at risk if you had preeclampsia, intrauterine growth retardation (IUGR),  birth, placental abruption, or fetal death in a past pregnancy.  Health history of mother. You are at risk if you have diabetes, high blood  pressure, obesity, kidney disease, autoimmune disease such as lupus, or a family history of preeclampsia.  Current pregnancy. You are at risk if this is your first pregnancy, or if you have multiple fetuses, are younger than age 18 or older than 40, or used in vitro fertilization.  Race. You are at risk if you are black.  Dangers of preeclampsia  If not treated, preeclampsia can cause problems for you and your baby. The placenta is the organ that nourishes your baby. It may tear away from the uterine wall. This can put the baby at risk for health problems (fetal distress) and premature birth. Preeclampsia can also cause these health problems:  Kidney failure or other organ damage  Seizures  Stroke  Once you give birth  In most cases, preeclampsia goes away on its own soon after you give birth. This is often by the 12th week after you give deliver. Within days of delivery, your blood pressure, swelling, and other symptoms should get better. For some women, problems from preeclampsia can continue after delivery.  Postpartum preeclampsia that develops within the first 48 hours after delivery is rare. Another type of postpartum preeclampsia that develops more than 48 hours after delivery is called late-onset preeclampsia. It is also rare. Contact your healthcare provider right away if you have symptoms of preeclampsia after you deliver.  [employer] last reviewed this educational content on 12/1/2019  © 8492-3949 [employer], [employer]. [address] [place] [address], [place] [address]. All rights reserved. This information is not intended as a substitute for professional medical care. Always follow your healthcare professional's instructions.        Kick Counts    It’s normal to worry about your baby’s health. One way you can know your baby’s doing well is to record the baby’s movements once a day. This is called a kick count. Remember to take your kick count records to all your appointments with your healthcare provider.  How  to count kicks  Time how long it takes you to feel 10 kicks, flutters, swishes, or rolls. Ideally, you want to feel at least 10 movements within 2 hours. You will likely feel 10 movements in less time than that.  Starting at 28 weeks, count your baby's movements daily. Follow your healthcare provider's instructions for kick counting. Here are tips for counting kicks:  Choose a time when the baby is active, such as after a meal.   Sit comfortably or lie on your side.   The first time the baby moves, write down the time.   Count each movement until the baby has moved 10 times. This can take from 20 minutes to 2 hours.   If you have not felt 10 kicks by the end of the second hour, wait a few hours. Then try again.  Try to do it at the same time each day.  When to call your healthcare provider  Call your healthcare provider right away if:  You do a couple sets of kick counts during the day and your baby moves fewer than 10 times in 2 hours  Your baby moves much less often than on the days before.  You have not felt your baby move all day.  [employer] last reviewed this educational content on 12/1/2017  © 1605-2363 [employer], [employer]. [address] [place] [address], [place] [address]. All rights reserved. This information is not intended as a substitute for professional medical care. Always follow your healthcare professional's instructions.

## 2024-08-14 NOTE — PROGRESS NOTES
Corinne, , is at 30w2d, here for her ERICKA visit.  Currently, she is feeling well. Denies 3rd trimester danger signs. Trying an OTC iron supplement because she re-started the Ferrous Sulfate and had diarrhea again. Will try it for 2 weeks then check her labs/complete ordered labs.    Vital signs and weight reviewed  See flowsheets    Assessment/Plan: labs on order for next visit, Has growth scan   Next visit: 2 weeks    Reviewed:   Prenatal visit schedule   labor precautions  Kick counts  Danger signs    Pt verbalized understanding. All questions answered. No barriers to learning identified

## 2024-08-20 NOTE — PROGRESS NOTES
Outpatient Maternal-Fetal Medicine Follow-Up    Dear Ms.. Loa    Thank you for requesting an ultrasound and maternal-fetal medicine consultation on your patient Corinne Denise Macnichol.  As you are aware she is a 33 year old female  with a craig pregnancy and an Estimated Date of Delivery: 10/21/24.  She returned to maternal-fetal medicine today for a follow-up visit.  Her history was reviewed from her prior visit and there were no interval changes.    Antepartum Risk Factors  AMA---NIPT low risk, declines invasive testing   Depression in pregnancy    PHYSICAL EXAMINATION:  /76   Pulse 108   Wt 186 lb (84.4 kg)   LMP 2024 (Exact Date)   BMI 27.45 kg/m²   General: alert and oriented, no acute distress  Abdomen: gravid, soft, non-tender  Extremities: non-tender, no edema    OBSTETRIC ULTRASOUND  The patient had a follow-up growth ultrasound today which revealed normal interval fetal growth, BPP 8/8.    Ultrasound Findings:  Single IUP in cephalic presentation.    Placenta is posterior.   A 3 vessel cord is noted.  Cardiac activity is present at 135 bpm  EFW 2308 g ( 5 lb 1 oz); 73%.    FANTA is  19.6 cm.  MVP is 6.6 cm  BPP is 8/8.     The fetal measurements are consistent with established EDC. No gross ultrasound evidence of structural abnormalities are seen today. The patient understands that ultrasound cannot rule out all structural and chromosomal abnormalities.     See Imaging Tab For Complete Ultrasound Report  I interpreted the results and reviewed them with the patient.    DISCUSSION  During her visit we discussed and reviewed the following issues:  ADVANCED MATERNAL AGE  See prior M notes for a detailed review.  She did not desire invasive genetic testing.   She has already obtained a low-risk NPIT result and was appropriately reassured.     IMPRESSION:  IUP at 32w1d  AMA---NIPT low risk, declines invasive testing   Depression in pregnancy    RECOMMENDATIONS:  Continue care  with Ms.. Rojas  Weekly NST's at 36 weeks    Thank you for allowing me to participate in the care of your patient.  Please do not hesitate to contact me if additional questions or concerns arise.      Man Henriquez M.D.    20 minutes spent in review of records, patient consultation, documentation and coordination of care.  The relevant clinical matter(s) are summarized above.     Note to patient and family  The 21st Century Cures Act makes medical notes available to patients in the interest of transparency.  However, please be advised that this is a medical document.  It is intended as [id number] communication.  It is written and medical language may contain abbreviations or verbiage that are technical and unfamiliar.  It may appear blunt or direct.  Medical documents are intended to carry relevant information, facts as evident, and the clinical opinion of the practitioner.

## 2024-08-27 ENCOUNTER — HOSPITAL ENCOUNTER (OUTPATIENT)
Dept: PERINATAL CARE | Facility: HOSPITAL | Age: 34
Discharge: HOME | End: 2024-08-27
Attending: OBSTETRICS & GYNECOLOGY
Payer: COMMERCIAL

## 2024-08-27 ENCOUNTER — ROUTINE PRENATAL (OUTPATIENT)
Dept: OBGYN CLINIC | Facility: CLINIC | Age: 34
End: 2024-08-27
Payer: COMMERCIAL

## 2024-08-27 ENCOUNTER — LAB ENCOUNTER (OUTPATIENT)
Dept: LAB | Facility: HOSPITAL | Age: 34
End: 2024-08-27
Attending: OBSTETRICS & GYNECOLOGY
Payer: COMMERCIAL

## 2024-08-27 VITALS
WEIGHT: 186 LBS | BODY MASS INDEX: 27 KG/M2 | HEART RATE: 94 BPM | SYSTOLIC BLOOD PRESSURE: 116 MMHG | DIASTOLIC BLOOD PRESSURE: 78 MMHG

## 2024-08-27 VITALS
SYSTOLIC BLOOD PRESSURE: 119 MMHG | WEIGHT: 186 LBS | DIASTOLIC BLOOD PRESSURE: 76 MMHG | HEART RATE: 108 BPM | BODY MASS INDEX: 27 KG/M2

## 2024-08-27 DIAGNOSIS — Z34.83 PRENATAL CARE, SUBSEQUENT PREGNANCY IN THIRD TRIMESTER (HCC): ICD-10-CM

## 2024-08-27 DIAGNOSIS — R79.89 LOW TSH LEVEL: ICD-10-CM

## 2024-08-27 DIAGNOSIS — Z34.93 PRENATAL CARE IN THIRD TRIMESTER (HCC): ICD-10-CM

## 2024-08-27 DIAGNOSIS — O09.523 AMA (ADVANCED MATERNAL AGE) MULTIGRAVIDA 35+, THIRD TRIMESTER (HCC): ICD-10-CM

## 2024-08-27 DIAGNOSIS — O99.019 ANTEPARTUM ANEMIA (HCC): ICD-10-CM

## 2024-08-27 DIAGNOSIS — O09.523 AMA (ADVANCED MATERNAL AGE) MULTIGRAVIDA 35+, THIRD TRIMESTER (HCC): Primary | ICD-10-CM

## 2024-08-27 LAB
DEPRECATED RDW RBC AUTO: 41.3 FL (ref 35.1–46.3)
ERYTHROCYTE [DISTWIDTH] IN BLOOD BY AUTOMATED COUNT: 14.3 % (ref 11–15)
HCT VFR BLD AUTO: 34.5 %
HGB BLD-MCNC: 11.5 G/DL
MCH RBC QN AUTO: 26.9 PG (ref 26–34)
MCHC RBC AUTO-ENTMCNC: 33.3 G/DL (ref 31–37)
MCV RBC AUTO: 80.8 FL
PLATELET # BLD AUTO: 263 10(3)UL (ref 150–450)
RBC # BLD AUTO: 4.27 X10(6)UL
T PALLIDUM AB SER QL IA: NONREACTIVE
TSI SER-ACNC: 1.04 MIU/ML (ref 0.55–4.78)
WBC # BLD AUTO: 10.6 X10(3) UL (ref 4–11)

## 2024-08-27 PROCEDURE — 36415 COLL VENOUS BLD VENIPUNCTURE: CPT

## 2024-08-27 PROCEDURE — 86780 TREPONEMA PALLIDUM: CPT

## 2024-08-27 PROCEDURE — 76819 FETAL BIOPHYS PROFIL W/O NST: CPT

## 2024-08-27 PROCEDURE — 76816 OB US FOLLOW-UP PER FETUS: CPT | Performed by: OBSTETRICS & GYNECOLOGY

## 2024-08-27 PROCEDURE — 85027 COMPLETE CBC AUTOMATED: CPT

## 2024-08-27 PROCEDURE — 84443 ASSAY THYROID STIM HORMONE: CPT

## 2024-08-27 PROCEDURE — 87389 HIV-1 AG W/HIV-1&-2 AB AG IA: CPT

## 2024-08-27 NOTE — PROGRESS NOTES
Active fetus Denies any complaints.  Denies any vaginal bleeding, leaking of fluid or vaginal discharge.   No signs signs of PTL.  Reviewed S&S of PTL  Warning signs reviewed  All questions answered.      Ultrasound before visit EFW 2308 g ( 5 lb 1 oz); 73%.

## 2024-09-10 ENCOUNTER — ROUTINE PRENATAL (OUTPATIENT)
Dept: OBGYN CLINIC | Facility: CLINIC | Age: 34
End: 2024-09-10
Payer: COMMERCIAL

## 2024-09-10 VITALS
BODY MASS INDEX: 28 KG/M2 | DIASTOLIC BLOOD PRESSURE: 82 MMHG | SYSTOLIC BLOOD PRESSURE: 125 MMHG | HEART RATE: 85 BPM | WEIGHT: 188 LBS

## 2024-09-10 DIAGNOSIS — Z34.93 PRENATAL CARE IN THIRD TRIMESTER (HCC): Primary | ICD-10-CM

## 2024-09-10 DIAGNOSIS — O09.523 AMA (ADVANCED MATERNAL AGE) MULTIGRAVIDA 35+, THIRD TRIMESTER (HCC): ICD-10-CM

## 2024-09-10 NOTE — PATIENT INSTRUCTIONS
Kick Counts  It’s normal to worry about your baby’s health. One way you can know your baby’s doing well is to record the baby’s movements once a day. This is called a kick count.   Remember to take your kick count records to all your appointments with your healthcare provider.  How to count kicks    Time how long it takes you to feel 10 kicks, flutters, swishes, or rolls. Ideally, you want to feel at least 10 movements in 2 hours. You will likely feel 10 movements in less time than that.  Starting at 28 weeks, count your baby's movements daily. Follow your healthcare provider's instructions for kick counting. Here are tips for counting kicks:  Choose a time when the baby is active, such as after a meal.   Sit comfortably or lie on your side.   The first time the baby moves, write down the time.   Count each movement until the baby has moved  10 times. This can take from 20 minutes to 2 hours.   If you haven't felt 10 kicks by the end of the second hour, wait a few hours. Then try again.  Try to do it at the same time each day.  When to call your healthcare provider  Call your healthcare provider  right away if:  You do a couple sets of kick counts during the day and your baby moves fewer than 10 times in 2 hours.  Your baby moves much less often than on the days before.  You haven't felt your baby move all day.  [employer] last reviewed this educational content on 2020    © 8270-3607 [employer]. All rights reserved. This information is not intended as a substitute for professional medical care. Always follow your healthcare professional's instructions. Premature Labor    Premature labor ( labor) is when symptoms of labor occur before 37 weeks of pregnancy. (This is 3 weeks before your due date.) Premature labor can lead to premature delivery. This means giving birth to your baby early. Babies need at least 37 weeks of pregnancy for all the organs to develop normally. The earlier the  delivery, the greater the risks to the baby.  In most cases, the cause of premature labor is unknown. But certain factors may make the problem more likely. These include:  History of premature labor with other pregnancies  Smoking  Alcohol or substance abuse  Low pre-pregnancy weight or weight gain during pregnancy  Short time period between pregnancies  Being pregnant with twins, triplets, or more  History of certain types of surgery on the cervix or uterus  Having a short cervix  Certain infections  There are a number of other risk factors. Ask your healthcare provider to help you understand the risk factors specific to your case. Then find out what you can do to control or reduce them.  Contractions are one of the main signs of premature labor. A contraction is different from cramping. It may feel painful and the belly (abdomen) may get hard. It can last from a few seconds to a few minutes. Some women may feel only a sense of pressure in the belly, thighs, rectum, or vagina. Some may feel only the hardening of the uterus without pain or pressure. Or there may be a constant pain in the lower back, which spreads forward toward the belly.Premature labor is often treated with medicines. A hospital stay may be needed. If labor doesn't progress and you and your baby are both healthy, you may be discharged to continue care at home.  Home care  Ask your provider any questions you have. Be certain you understand how to care for yourself at home. Also follow all recommendations given by your healthcare providers.  Learn the signs of premature labor. Watch for these signs when you get home.  Limit or restrict activities as advised. This may include stopping certain physical activities and cutting back hours at work.  Avoid doing strenuous work as directed by your provider. Ask family and friends for help with tasks and support at home, if needed.  Don’t smoke, drink alcohol, or use other harmful substances.  Take steps to  reduce stress.  Report any unusual symptoms to your provider.    Follow-up care  Follow up with your healthcare provider, or as directed. Weekly visits with your provider may be needed.  When to seek medical advice  Call your healthcare provider right away if any of these occur:  Regular or frequent contractions, whether they are painful or not  Pressure in the pelvis  Pressure in the lower belly or mild cramping in your belly with or without diarrhea  Constant low, dull backache  Gush or slow leaking of water from your vagina  Change in vaginal discharge (watery, mucus, or bloody)  Any vaginal bleeding  Decreased movement of your baby  [employer] last reviewed this educational content on 2018 [employer]. All rights reserved. This information is not intended as a substitute for professional medical care. Always follow your healthcare professional's instructions.     Understanding Preeclampsia  Preeclampsia is a condition that can happen in pregnancy. It includes high blood pressure (hypertension), swelling, and signs of organ problems. It can show up around week 20 of pregnancy. It often goes away by 12 weeks after you give birth. It can lead to serious health risks for you and your baby. During your pregnancy, your healthcare provider will watch your blood pressure.      Your blood pressure will be monitored regularly throughout your pregnancy to help check for preeclampsia.     Dangers of preeclampsia   If not treated, preeclampsia can cause problems for you and your baby. The placenta is the organ that nourishes your baby. It may tear away from the wall of the uterus. This can put the baby at risk for health problems (fetal distress). It can put the baby at risk for  birth. Preeclampsia can also cause these health problems in you:   Kidney failure or other organ damage  Seizures  Stroke  Who’s at risk for preeclampsia?   No one knows what causes preeclampsia. It can happen  in any pregnant person. But there are things that increase your risk. You may need to take a daily low dose of aspirin if you are at risk for preeclampsia.   You’re at higher risk for preeclampsia if you have any of these:  Diabetes  High blood pressure  Obesity  Kidney disease  Autoimmune disease such as lupus  A family history of preeclampsia  You’re at higher risk if any of these apply to you:  This is your first pregnancy  You are having twins or more  You’re under age 18 or over age 40  You used in vitro fertilization  You are Black  And you’re at higher risk if you had any of these in a past pregnancy:   Preeclampsia  Intrauterine growth retardation (IUGR)   birth  Placental abruption  Fetal death  Symptoms  A common symptom of preeclampsia is high blood pressure. Other symptoms may include:   Fast weight gain  Protein in your urine  Headache  Belly (abdominal) pain on your right side  Vision problems such as flashes or spots  Swelling (edema) in your face or hands (this often happens near the end of a normal pregnancy)  Tests you may have   Your healthcare provider will want to check your blood pressure. This will need to be done often in your pregnancy. If your blood pressure is high, you may have these tests:   Urine tests to look for protein  Blood tests to confirm preeclampsia  Fetal monitoring to make sure that your baby is healthy  Treating preeclampsia   Preeclampsia almost always ends soon after you give birth. Until then, your healthcare provider can help you manage it.   If your symptoms are mild, you may to:     Limit your activity  Rest in bed  Not do heavy lifting    If your symptoms are severe, you will stay in the hospital. Treatment here may include:   Limits to your activity. This is to help control your blood pressure. You should not lift anything heavy. You will need to spend 8 hours a day lying down with your feet up.  Magnesium IV (intravenous) drip. This is done during labor. It's  to prevent seizures  Induced labor or  section. Birth of the baby is considered the cure for preeclampsia.  When to call your healthcare provider   Call your healthcare provider if your symptoms start quickly or are severe. This includes swelling, weight gain, or other symptoms. Some cases of preeclampsia are more severe than others. Your symptoms also may change or get worse as you get closer to your due date.   Once you give birth   In most cases, preeclampsia goes away on its own soon after you give birth. This is often by the 12th week after you deliver. Within days after you give birth, your blood pressure, swelling, and other symptoms should get better. But for some people, problems from preeclampsia can continue after birth.   Postpartum preeclampsia   Preeclampsia that starts after birth is rare. There are 2 types:   Postpartum preeclampsia. This may start in the first 48 hours after birth.  Late-onset preeclampsia. This starts more than 48 hours after birth.  Both of these types are rare. But call your healthcare provider right away if you have symptoms of preeclampsia after you give birth.   [employer] last reviewed this educational content on 10/1/2021    © 8114-6333 [employer]. All rights reserved. This information is not intended as a substitute for professional medical care. Always follow your healthcare professional's instructions.

## 2024-09-10 NOTE — PROGRESS NOTES
Here for ERICKA visit.      Patient's last menstrual period was 2024 (exact date). 10/21/2024, by Ultrasound 34w1d     Baby active. Denies contractions, LOF or bleeding    Labs: UTD    Tdap: completed      ICD-10-CM    1. Prenatal care in third trimester ([facility])  Z34.93       2. AMA (advanced maternal age) multigravida 35+, third trimester ([facility])  O09.523    -has NST's scheduled       Fetal kick counts, warning signs and signs PTL reviewed.  GBS @ nv

## 2024-09-23 ENCOUNTER — PATIENT MESSAGE (OUTPATIENT)
Dept: OBGYN CLINIC | Facility: CLINIC | Age: 34
End: 2024-09-23

## 2024-09-23 ENCOUNTER — ROUTINE PRENATAL (OUTPATIENT)
Dept: OBGYN CLINIC | Facility: CLINIC | Age: 34
End: 2024-09-23
Payer: COMMERCIAL

## 2024-09-23 VITALS
BODY MASS INDEX: 28.26 KG/M2 | HEIGHT: 69 IN | HEART RATE: 80 BPM | DIASTOLIC BLOOD PRESSURE: 83 MMHG | SYSTOLIC BLOOD PRESSURE: 121 MMHG | WEIGHT: 190.81 LBS

## 2024-09-23 DIAGNOSIS — Z34.83 ENCOUNTER FOR SUPERVISION OF OTHER NORMAL PREGNANCY IN THIRD TRIMESTER (HCC): Primary | ICD-10-CM

## 2024-09-23 PROCEDURE — 59025 FETAL NON-STRESS TEST: CPT | Performed by: ADVANCED PRACTICE MIDWIFE

## 2024-09-23 NOTE — PROGRESS NOTES
GBS collected. No concerns. Desires IOL at mid 39 weeks if possible. Last labor spontaneous at 39 weeks. Reviewed danger signs. NST in office today.

## 2024-09-23 NOTE — PROCEDURES
Nonstress Test       Patient: Corinne Denise Macnichol    Gestation: 36w0d    Diagnosis from order:  AMA, 3rd trimester    Problem List:   Patient Active Problem List   Diagnosis    Leg pain, posterior, left    Chronic left-sided lumbar radiculopathy    Major depressive disorder    Asthma (HCC)    Moderate episode of recurrent major depressive disorder (HCC)    Anxiety disorder    Drug-induced sexual dysfunction (HCC)    Nausea/vomiting in pregnancy (HCC)    Low TSH level    At risk for postpartum depression    Anxiety during pregnancy (HCC)    Antepartum anemia (HCC)    AMA (advanced maternal age) multigravida 35+, third trimester (HCC)    Pelvic pain in pregnancy (HCC)        NST:  NST completed.    Fetal Surveillance:   Fetal Heart Tones (FHTs): 145 with moderate variability, accelerations +, decelerations none  Uterine contractions (Ucs): none     [  X  ]  Reactive: Discharged home, follow-up plan weekly visits  Iram Rojas CNM, 09/23/24, 11:41 AM

## 2024-09-24 LAB — GROUP B STREP BY PCR FOR PCR OVT: NEGATIVE

## 2024-09-30 ENCOUNTER — ROUTINE PRENATAL (OUTPATIENT)
Dept: OBGYN CLINIC | Facility: CLINIC | Age: 34
End: 2024-09-30
Payer: COMMERCIAL

## 2024-09-30 VITALS
BODY MASS INDEX: 27.9 KG/M2 | WEIGHT: 188.38 LBS | HEART RATE: 75 BPM | DIASTOLIC BLOOD PRESSURE: 82 MMHG | HEIGHT: 69 IN | SYSTOLIC BLOOD PRESSURE: 127 MMHG

## 2024-09-30 DIAGNOSIS — Z34.83 PRENATAL CARE, SUBSEQUENT PREGNANCY IN THIRD TRIMESTER (HCC): Primary | ICD-10-CM

## 2024-09-30 DIAGNOSIS — O09.523 MULTIGRAVIDA OF ADVANCED MATERNAL AGE IN THIRD TRIMESTER (HCC): ICD-10-CM

## 2024-09-30 NOTE — PROCEDURES
Nonstress Test       Patient: Corinne Denise Macnichol    Gestation: 37w0d    Diagnosis from order:  Advanced maternal age    Problem List:   Patient Active Problem List   Diagnosis    Leg pain, posterior, left    Chronic left-sided lumbar radiculopathy    Major depressive disorder    Asthma (HCC)    Moderate episode of recurrent major depressive disorder (HCC)    Anxiety disorder    Drug-induced sexual dysfunction (HCC)    Nausea/vomiting in pregnancy (HCC)    Low TSH level    At risk for postpartum depression    Anxiety during pregnancy (HCC)    Antepartum anemia (HCC)    AMA (advanced maternal age) multigravida 35+, third trimester (HCC)    Pelvic pain in pregnancy (HCC)        NST:  NST completed.    Fetal Surveillance:   Fetal Heart Tones (FHTs): 150 with moderate variability, accelerations present, decelerations absent  Uterine contractions (Ucs): none    Reactive: Discharged home. ERICKA and NST one week     Ana María Cannon CNM, 09/30/24, 3:42 PM

## 2024-09-30 NOTE — PROGRESS NOTES
Feeling well. Endorses regular fetal movement. Denies contractions, LOF, vaginal bleeding.     Will desires elective 39 week IOL. Plan to schedule at next visit.    Birth Plan Reviewed  Support:   Labor pain management: epidural  Vitamin K: yes  Eye ointment:yes  Placenta: no plans  Circumcision: n/a  Peds: Dr. Aliyah Mckinney  Birth control: Mirena  Feeding method: breastfeed  Postpartum support: partner and family  Previous birth: uncomplicated    NST reactive    Plan:  ERICKA and NST 1 weeks    Reviewed third trimester warning signs and labor signs and when to call.

## 2024-10-07 ENCOUNTER — ROUTINE PRENATAL (OUTPATIENT)
Dept: OBGYN CLINIC | Facility: CLINIC | Age: 34
End: 2024-10-07
Payer: COMMERCIAL

## 2024-10-07 VITALS
DIASTOLIC BLOOD PRESSURE: 80 MMHG | HEART RATE: 80 BPM | SYSTOLIC BLOOD PRESSURE: 130 MMHG | BODY MASS INDEX: 28 KG/M2 | WEIGHT: 191.19 LBS

## 2024-10-07 DIAGNOSIS — O09.523 MULTIGRAVIDA OF ADVANCED MATERNAL AGE IN THIRD TRIMESTER (HCC): Primary | ICD-10-CM

## 2024-10-07 NOTE — PROGRESS NOTES
Active baby. IOL scheduled at 39+3 10/17 5am. SVE 2/50/0 recommend balloon vs pitocin. Reviewed danger signs. NST today. Keep appointment next week.

## 2024-10-15 ENCOUNTER — TELEPHONE (OUTPATIENT)
Dept: OBGYN UNIT | Facility: HOSPITAL | Age: 34
End: 2024-10-15

## 2024-10-15 ENCOUNTER — ROUTINE PRENATAL (OUTPATIENT)
Dept: OBGYN CLINIC | Facility: CLINIC | Age: 34
End: 2024-10-15
Payer: COMMERCIAL

## 2024-10-15 ENCOUNTER — HOSPITAL ENCOUNTER (OUTPATIENT)
Dept: PERINATAL CARE | Facility: HOSPITAL | Age: 34
Discharge: HOME | End: 2024-10-15
Attending: ADVANCED PRACTICE MIDWIFE
Payer: COMMERCIAL

## 2024-10-15 ENCOUNTER — HOSPITAL ENCOUNTER (OUTPATIENT)
Facility: HOSPITAL | Age: 34
Discharge: HOME | End: 2024-10-15
Attending: ADVANCED PRACTICE MIDWIFE | Admitting: OBSTETRICS & GYNECOLOGY
Payer: COMMERCIAL

## 2024-10-15 VITALS
DIASTOLIC BLOOD PRESSURE: 83 MMHG | SYSTOLIC BLOOD PRESSURE: 116 MMHG | HEART RATE: 111 BPM | BODY MASS INDEX: 28.24 KG/M2 | HEIGHT: 69 IN | WEIGHT: 190.63 LBS

## 2024-10-15 VITALS — SYSTOLIC BLOOD PRESSURE: 125 MMHG | DIASTOLIC BLOOD PRESSURE: 68 MMHG

## 2024-10-15 DIAGNOSIS — Z34.83 PRENATAL CARE, SUBSEQUENT PREGNANCY IN THIRD TRIMESTER (HCC): Primary | ICD-10-CM

## 2024-10-15 DIAGNOSIS — O09.523 AMA (ADVANCED MATERNAL AGE) MULTIGRAVIDA 35+, THIRD TRIMESTER (HCC): ICD-10-CM

## 2024-10-15 DIAGNOSIS — O28.8 NST (NON-STRESS TEST) NONREACTIVE: ICD-10-CM

## 2024-10-15 DIAGNOSIS — O09.523 MULTIGRAVIDA OF ADVANCED MATERNAL AGE IN THIRD TRIMESTER (HCC): ICD-10-CM

## 2024-10-15 DIAGNOSIS — O28.8 NST (NON-STRESS TEST) WITH DECELERATIONS: Primary | ICD-10-CM

## 2024-10-15 PROCEDURE — 76818 FETAL BIOPHYS PROFILE W/NST: CPT | Performed by: ADVANCED PRACTICE MIDWIFE

## 2024-10-15 PROCEDURE — 76815 OB US LIMITED FETUS(S): CPT

## 2024-10-15 PROCEDURE — 59025 FETAL NON-STRESS TEST: CPT

## 2024-10-15 PROCEDURE — 76819 FETAL BIOPHYS PROFIL W/O NST: CPT | Performed by: ADVANCED PRACTICE MIDWIFE

## 2024-10-15 PROCEDURE — 99214 OFFICE O/P EST MOD 30 MIN: CPT

## 2024-10-15 NOTE — PROCEDURES
Nonstress Test       Patient: Corinne Denise Macnichol    Gestation: 39w1d    Diagnosis from order:  ama      Problem List:   Patient Active Problem List   Diagnosis    Leg pain, posterior, left    Chronic left-sided lumbar radiculopathy    Major depressive disorder    Asthma (HCC)    Moderate episode of recurrent major depressive disorder (HCC)    Anxiety disorder    Drug-induced sexual dysfunction (HCC)    Nausea/vomiting in pregnancy (HCC)    Low TSH level    At risk for postpartum depression    Anxiety during pregnancy (HCC)    Antepartum anemia (HCC)    AMA (advanced maternal age) multigravida 35+, third trimester (HCC)    Pelvic pain in pregnancy (HCC)        NST:  NST completed.    Fetal Surveillance:   Fetal Heart Tones (FHTs): 140 with moderate variability, accelerations nonw, decelerations variables  Uterine contractions (Ucs): occassional      [  X  ]  Non-Reactive: to triage    Amaya Mary CNM, 10/15/24, 11:22 AM    
[phone]

## 2024-10-15 NOTE — PROGRESS NOTES
Active fetus Increasing BH contractions. Denies any leaking of fluid or bleeding.  Reviewed S&S labor  Warning signs reviewed  All questions answered.     IOL scheduled Thursday    NST today -non-reactive    Membranes swept

## 2024-10-15 NOTE — PROGRESS NOTES
[facility] ULTRASOUND REPORT   See imaging tab for complete consultation / ultrasound report      Ultrasound Findings:  Single IUP in cephalic presentation.    Placenta is posterior.   A 3 vessel cord is noted.  Cardiac activity is present at 146 bpm  MVP is 5.2 cm . FANTA 13.7 cm  BPP is 8/8.     Mary Fisher MD      This was an ultrasound only encounter (no physician visit).        Note to patient and family  The 21st Century Cures Act makes medical notes available to patients in the interest of transparency.  However, please be advised that this is a medical document.  It is intended as [id number] communication.  It is written and medical language may contain abbreviations or verbiage that are technical and unfamiliar.  It may appear blunt or direct.  Medical documents are intended to carry relevant information, facts as evident, and the clinical opinion of the practitioner.

## 2024-10-15 NOTE — TRIAGE
[facility]  part of [facility]      Triage Note    Corinne Denise Macnichol Patient Status:  Outpatient    10/5/1990 MRN [id number]   Location [facility] Attending Ana María Cannon CNM   Hosp Day # 0 PCP Aliyah Mckinney MD      Para:   Estimated Date of Delivery: 10/21/24  Gestation: 39w1d    Chief Complaint    Non-stress Test         Allergies:  Allergies[1]    No orders of the defined types were placed in this encounter.      Lab Results   Component Value Date    WBC 10.6 2024    HGB 11.5 (L) 2024    HCT 34.5 (L) 2024    .0 2024    CREATSERUM 0.74 2022    BUN 8 2022     (L) 2022    K 3.5 2022     2022    CO2 19.0 (L) 2022    GLU 83 2022    CA 9.4 2022    ALB 2.9 (L) 2022    ALKPHO 302 (H) 2022    BILT 0.5 2022    TP 7.4 2022    AST 26 2022    ALT 21 2022    T4F 1.3 2024    TSH 1.043 2024     2022    GGT 17 2022    FFN Negative 2024       Clinitek UA  Lab Results   Component Value Date    URCOLOR Yellow 2021    URCLA Clear 2021    GLUUR Normal 2024    URINEBILI Negative 2021    URINEKETONE 40 (A) 2021    SPECGRAVITY 1.007 2024    PHUR 6.0 2021    PROTURINE Negative 2021    UROBILI <2.0 2021    URINENITRITE Negative 2021    URINELEUK Negative 2021    URINECUL No Growth at 18-24 hrs. 2024       UA  Lab Results   Component Value Date    COLORUR Light-Yellow 2024    CLARITY Turbid (A) 2024    SPECGRAVITY 1.007 2024    PROUR Negative 2024    GLUUR Normal 2024    KETUR Negative 2024    BILUR Negative 2024    BLOODURINE Negative 2024    NITRITE Negative 2024    UROBILINOGEN Normal 2024    LEUUR 75 (A) 2024    UASA Negative 2022       Vitals:    10/15/24  1100   BP: 125/68       NST  Variability: Moderate           Accelerations: Yes           Decelerations: None            Baseline: 145 BPM           Uterine Irritability: No           Contractions: Irregular                                        Acoustic Stimulator: No           Nonstress Test Interpretation: Reactive                                    Additional Comments       Chief Complaint   Patient presents with    Non-stress Test     Sent for further monitoring due to a non-reactive NST at office   EFM tracing reactive with irregular contractions. + FM. BPP done- results 8 of 8 with FANTA 13.7 cm. Ana María Cannon CNM, informed of results. Discharge order received. Kick count and labor precautions reviewed with pt. Pt is scheduled IOL 10/17/24. Discharged to home.      Roxie RODRIGUEZ RN  10/15/2024 11:57 AM    CNM Addendum  NST in triage reactive. BPP 8/8. Agree with discharge home. IOL scheduled for Thursday  Ana María Cannon CNM       [1] No Known Allergies

## 2024-10-15 NOTE — PROGRESS NOTES
Pt is a 34 year old female admitted to TR1/TR1-A.     Chief Complaint   Patient presents with    Non-stress Test     Sent for further monitoring due to a non-reactive NST at office      Pt is  39w1d intra-uterine pregnancy.  History obtained, consents signed. Oriented to room, staff, and plan of care.

## 2024-10-17 ENCOUNTER — APPOINTMENT (OUTPATIENT)
Dept: OBGYN CLINIC | Facility: HOSPITAL | Age: 34
End: 2024-10-17
Attending: ADVANCED PRACTICE MIDWIFE
Payer: COMMERCIAL

## 2024-10-17 ENCOUNTER — HOSPITAL ENCOUNTER (INPATIENT)
Facility: HOSPITAL | Age: 34
LOS: 2 days | Discharge: HOME | End: 2024-10-19
Attending: ADVANCED PRACTICE MIDWIFE | Admitting: OBSTETRICS & GYNECOLOGY
Payer: COMMERCIAL

## 2024-10-17 ENCOUNTER — ANESTHESIA EVENT (OUTPATIENT)
Dept: OBGYN UNIT | Facility: HOSPITAL | Age: 34
End: 2024-10-17
Payer: COMMERCIAL

## 2024-10-17 ENCOUNTER — ANESTHESIA (OUTPATIENT)
Dept: OBGYN UNIT | Facility: HOSPITAL | Age: 34
End: 2024-10-17
Payer: COMMERCIAL

## 2024-10-17 DIAGNOSIS — O09.523 AMA (ADVANCED MATERNAL AGE) MULTIGRAVIDA 35+, THIRD TRIMESTER (HCC): ICD-10-CM

## 2024-10-17 PROBLEM — Z34.90 ENCOUNTER FOR INDUCTION OF LABOR (HCC): Status: ACTIVE | Noted: 2024-10-17

## 2024-10-17 PROBLEM — O13.3 GESTATIONAL HYPERTENSION, THIRD TRIMESTER (HCC): Status: ACTIVE | Noted: 2024-10-17

## 2024-10-17 PROBLEM — Z34.90 ENCOUNTER FOR ELECTIVE INDUCTION OF LABOR (HCC): Status: ACTIVE | Noted: 2024-10-17

## 2024-10-17 LAB
ALBUMIN SERPL-MCNC: 3.8 G/DL (ref 3.2–4.8)
ALBUMIN/GLOB SERPL: 1.6 {RATIO} (ref 1–2)
ALP LIVER SERPL-CCNC: 222 U/L
ALT SERPL-CCNC: 9 U/L
ANION GAP SERPL CALC-SCNC: 7 MMOL/L (ref 0–18)
ANTIBODY SCREEN: NEGATIVE
AST SERPL-CCNC: 16 U/L (ref ?–34)
BASOPHILS # BLD AUTO: 0.03 X10(3) UL (ref 0–0.2)
BASOPHILS # BLD AUTO: 0.04 X10(3) UL (ref 0–0.2)
BASOPHILS NFR BLD AUTO: 0.3 %
BASOPHILS NFR BLD AUTO: 0.4 %
BILIRUB SERPL-MCNC: 0.5 MG/DL (ref 0.3–1.2)
BUN BLD-MCNC: <5 MG/DL (ref 9–23)
CALCIUM BLD-MCNC: 8.6 MG/DL (ref 8.7–10.4)
CHLORIDE SERPL-SCNC: 111 MMOL/L (ref 98–112)
CO2 SERPL-SCNC: 22 MMOL/L (ref 21–32)
CREAT BLD-MCNC: 0.59 MG/DL
CREAT UR-SCNC: 35.5 MG/DL
DEPRECATED RDW RBC AUTO: 47.2 FL (ref 35.1–46.3)
DEPRECATED RDW RBC AUTO: 49.1 FL (ref 35.1–46.3)
EGFRCR SERPLBLD CKD-EPI 2021: 120 ML/MIN/1.73M2 (ref 60–?)
EOSINOPHIL # BLD AUTO: 0.08 X10(3) UL (ref 0–0.7)
EOSINOPHIL # BLD AUTO: 0.14 X10(3) UL (ref 0–0.7)
EOSINOPHIL NFR BLD AUTO: 0.8 %
EOSINOPHIL NFR BLD AUTO: 1.4 %
ERYTHROCYTE [DISTWIDTH] IN BLOOD BY AUTOMATED COUNT: 15.8 % (ref 11–15)
ERYTHROCYTE [DISTWIDTH] IN BLOOD BY AUTOMATED COUNT: 15.9 % (ref 11–15)
GLOBULIN PLAS-MCNC: 2.4 G/DL (ref 2–3.5)
GLUCOSE BLD-MCNC: 80 MG/DL (ref 70–99)
HCT VFR BLD AUTO: 34.9 %
HCT VFR BLD AUTO: 40 %
HGB BLD-MCNC: 11.5 G/DL
HGB BLD-MCNC: 13.3 G/DL
IMM GRANULOCYTES # BLD AUTO: 0.04 X10(3) UL (ref 0–1)
IMM GRANULOCYTES # BLD AUTO: 0.05 X10(3) UL (ref 0–1)
IMM GRANULOCYTES NFR BLD: 0.4 %
IMM GRANULOCYTES NFR BLD: 0.5 %
LYMPHOCYTES # BLD AUTO: 1.61 X10(3) UL (ref 1–4)
LYMPHOCYTES # BLD AUTO: 1.93 X10(3) UL (ref 1–4)
LYMPHOCYTES NFR BLD AUTO: 16.5 %
LYMPHOCYTES NFR BLD AUTO: 19.9 %
MCH RBC QN AUTO: 27.3 PG (ref 26–34)
MCH RBC QN AUTO: 27.8 PG (ref 26–34)
MCHC RBC AUTO-ENTMCNC: 33 G/DL (ref 31–37)
MCHC RBC AUTO-ENTMCNC: 33.3 G/DL (ref 31–37)
MCV RBC AUTO: 82 FL
MCV RBC AUTO: 84.5 FL
MONOCYTES # BLD AUTO: 0.54 X10(3) UL (ref 0.1–1)
MONOCYTES # BLD AUTO: 0.56 X10(3) UL (ref 0.1–1)
MONOCYTES NFR BLD AUTO: 5.6 %
MONOCYTES NFR BLD AUTO: 5.7 %
NEUTROPHILS # BLD AUTO: 7.01 X10 (3) UL (ref 1.5–7.7)
NEUTROPHILS # BLD AUTO: 7.01 X10(3) UL (ref 1.5–7.7)
NEUTROPHILS # BLD AUTO: 7.42 X10 (3) UL (ref 1.5–7.7)
NEUTROPHILS # BLD AUTO: 7.42 X10(3) UL (ref 1.5–7.7)
NEUTROPHILS NFR BLD AUTO: 72.2 %
NEUTROPHILS NFR BLD AUTO: 76.3 %
PLATELET # BLD AUTO: 199 10(3)UL (ref 150–450)
PLATELET # BLD AUTO: 250 10(3)UL (ref 150–450)
POTASSIUM SERPL-SCNC: 3.8 MMOL/L (ref 3.5–5.1)
PROT SERPL-MCNC: 6.2 G/DL (ref 5.7–8.2)
PROT UR-MCNC: <6 MG/DL (ref ?–14)
RBC # BLD AUTO: 4.13 X10(6)UL
RBC # BLD AUTO: 4.88 X10(6)UL
RH BLOOD TYPE: POSITIVE
SODIUM SERPL-SCNC: 140 MMOL/L (ref 136–145)
T PALLIDUM AB SER QL IA: NONREACTIVE
WBC # BLD AUTO: 9.7 X10(3) UL (ref 4–11)
WBC # BLD AUTO: 9.7 X10(3) UL (ref 4–11)

## 2024-10-17 PROCEDURE — 59400 OBSTETRICAL CARE: CPT | Performed by: ADVANCED PRACTICE MIDWIFE

## 2024-10-17 PROCEDURE — 0KQM0ZZ REPAIR PERINEUM MUSCLE, OPEN APPROACH: ICD-10-PCS | Performed by: ADVANCED PRACTICE MIDWIFE

## 2024-10-17 PROCEDURE — 3E033VJ INTRODUCTION OF OTHER HORMONE INTO PERIPHERAL VEIN, PERCUTANEOUS APPROACH: ICD-10-PCS | Performed by: ADVANCED PRACTICE MIDWIFE

## 2024-10-17 PROCEDURE — 10907ZC DRAINAGE OF AMNIOTIC FLUID, THERAPEUTIC FROM PRODUCTS OF CONCEPTION, VIA NATURAL OR ARTIFICIAL OPENING: ICD-10-PCS | Performed by: ADVANCED PRACTICE MIDWIFE

## 2024-10-17 RX ORDER — BISACODYL 10 MG
10 SUPPOSITORY, RECTAL RECTAL ONCE AS NEEDED
Status: DISCONTINUED | OUTPATIENT
Start: 2024-10-17 | End: 2024-10-19

## 2024-10-17 RX ORDER — IBUPROFEN 600 MG/1
600 TABLET, FILM COATED ORAL ONCE AS NEEDED
Status: DISCONTINUED | OUTPATIENT
Start: 2024-10-17 | End: 2024-10-17 | Stop reason: HOSPADM

## 2024-10-17 RX ORDER — TERBUTALINE SULFATE 1 MG/ML
0.25 INJECTION, SOLUTION SUBCUTANEOUS AS NEEDED
Status: DISCONTINUED | OUTPATIENT
Start: 2024-10-17 | End: 2024-10-17 | Stop reason: HOSPADM

## 2024-10-17 RX ORDER — ACETAMINOPHEN 500 MG
1000 TABLET ORAL EVERY 6 HOURS PRN
Status: DISCONTINUED | OUTPATIENT
Start: 2024-10-17 | End: 2024-10-19

## 2024-10-17 RX ORDER — CHOLECALCIFEROL (VITAMIN D3) 25 MCG
1 TABLET,CHEWABLE ORAL DAILY
Status: DISCONTINUED | OUTPATIENT
Start: 2024-10-17 | End: 2024-10-19

## 2024-10-17 RX ORDER — ACETAMINOPHEN 500 MG
500 TABLET ORAL EVERY 6 HOURS PRN
Status: DISCONTINUED | OUTPATIENT
Start: 2024-10-17 | End: 2024-10-17 | Stop reason: HOSPADM

## 2024-10-17 RX ORDER — ACETAMINOPHEN 500 MG
1000 TABLET ORAL EVERY 6 HOURS PRN
Status: DISCONTINUED | OUTPATIENT
Start: 2024-10-17 | End: 2024-10-17 | Stop reason: HOSPADM

## 2024-10-17 RX ORDER — DOCUSATE SODIUM 100 MG/1
100 CAPSULE, LIQUID FILLED ORAL
Status: DISCONTINUED | OUTPATIENT
Start: 2024-10-17 | End: 2024-10-19

## 2024-10-17 RX ORDER — BUPIVACAINE HCL/0.9 % NACL/PF 0.25 %
5 PLASTIC BAG, INJECTION (ML) EPIDURAL AS NEEDED
Status: DISCONTINUED | OUTPATIENT
Start: 2024-10-17 | End: 2024-10-17

## 2024-10-17 RX ORDER — LIDOCAINE HYDROCHLORIDE 10 MG/ML
30 INJECTION, SOLUTION EPIDURAL; INFILTRATION; INTRACAUDAL; PERINEURAL ONCE
Status: DISCONTINUED | OUTPATIENT
Start: 2024-10-17 | End: 2024-10-17 | Stop reason: HOSPADM

## 2024-10-17 RX ORDER — LIDOCAINE HYDROCHLORIDE 10 MG/ML
INJECTION, SOLUTION INFILTRATION; PERINEURAL
Status: COMPLETED | OUTPATIENT
Start: 2024-10-17 | End: 2024-10-17

## 2024-10-17 RX ORDER — NALBUPHINE HYDROCHLORIDE 10 MG/ML
2.5 INJECTION INTRAMUSCULAR; INTRAVENOUS; SUBCUTANEOUS
Status: DISCONTINUED | OUTPATIENT
Start: 2024-10-17 | End: 2024-10-17

## 2024-10-17 RX ORDER — BUPIVACAINE HYDROCHLORIDE 2.5 MG/ML
20 INJECTION, SOLUTION EPIDURAL; INFILTRATION; INTRACAUDAL ONCE
Status: DISCONTINUED | OUTPATIENT
Start: 2024-10-17 | End: 2024-10-17 | Stop reason: HOSPADM

## 2024-10-17 RX ORDER — ALBUTEROL SULFATE 90 UG/1
2 INHALANT RESPIRATORY (INHALATION) EVERY 4 HOURS PRN
Status: DISCONTINUED | OUTPATIENT
Start: 2024-10-17 | End: 2024-10-17

## 2024-10-17 RX ORDER — BUPIVACAINE HYDROCHLORIDE 2.5 MG/ML
INJECTION, SOLUTION EPIDURAL; INFILTRATION; INTRACAUDAL
Status: COMPLETED | OUTPATIENT
Start: 2024-10-17 | End: 2024-10-17

## 2024-10-17 RX ORDER — IBUPROFEN 600 MG/1
600 TABLET, FILM COATED ORAL EVERY 6 HOURS
Status: DISCONTINUED | OUTPATIENT
Start: 2024-10-17 | End: 2024-10-19

## 2024-10-17 RX ORDER — SIMETHICONE 80 MG
80 TABLET,CHEWABLE ORAL 3 TIMES DAILY PRN
Status: DISCONTINUED | OUTPATIENT
Start: 2024-10-17 | End: 2024-10-19

## 2024-10-17 RX ORDER — ONDANSETRON 2 MG/ML
4 INJECTION INTRAMUSCULAR; INTRAVENOUS EVERY 6 HOURS PRN
Status: DISCONTINUED | OUTPATIENT
Start: 2024-10-17 | End: 2024-10-17 | Stop reason: HOSPADM

## 2024-10-17 RX ORDER — LIDOCAINE HYDROCHLORIDE AND EPINEPHRINE 15; 5 MG/ML; UG/ML
INJECTION, SOLUTION EPIDURAL
Status: COMPLETED | OUTPATIENT
Start: 2024-10-17 | End: 2024-10-17

## 2024-10-17 RX ORDER — SODIUM CHLORIDE, SODIUM LACTATE, POTASSIUM CHLORIDE, CALCIUM CHLORIDE 600; 310; 30; 20 MG/100ML; MG/100ML; MG/100ML; MG/100ML
INJECTION, SOLUTION INTRAVENOUS CONTINUOUS
Status: DISCONTINUED | OUTPATIENT
Start: 2024-10-17 | End: 2024-10-17 | Stop reason: HOSPADM

## 2024-10-17 RX ORDER — BUPROPION HYDROCHLORIDE 150 MG/1
150 TABLET ORAL NIGHTLY
Status: DISCONTINUED | OUTPATIENT
Start: 2024-10-17 | End: 2024-10-19

## 2024-10-17 RX ORDER — DEXTROSE, SODIUM CHLORIDE, SODIUM LACTATE, POTASSIUM CHLORIDE, AND CALCIUM CHLORIDE 5; .6; .31; .03; .02 G/100ML; G/100ML; G/100ML; G/100ML; G/100ML
INJECTION, SOLUTION INTRAVENOUS AS NEEDED
Status: DISCONTINUED | OUTPATIENT
Start: 2024-10-17 | End: 2024-10-17 | Stop reason: HOSPADM

## 2024-10-17 RX ORDER — AMMONIA INHALANTS 0.04 G/.3ML
0.3 INHALANT RESPIRATORY (INHALATION) AS NEEDED
Status: DISCONTINUED | OUTPATIENT
Start: 2024-10-17 | End: 2024-10-19

## 2024-10-17 RX ORDER — CITRIC ACID/SODIUM CITRATE 334-500MG
30 SOLUTION, ORAL ORAL AS NEEDED
Status: DISCONTINUED | OUTPATIENT
Start: 2024-10-17 | End: 2024-10-17 | Stop reason: HOSPADM

## 2024-10-17 RX ORDER — ACETAMINOPHEN 500 MG
500 TABLET ORAL EVERY 6 HOURS PRN
Status: DISCONTINUED | OUTPATIENT
Start: 2024-10-17 | End: 2024-10-19

## 2024-10-17 RX ADMIN — BUPIVACAINE HYDROCHLORIDE 5 ML: 2.5 INJECTION, SOLUTION EPIDURAL; INFILTRATION; INTRACAUDAL at 13:10:00

## 2024-10-17 RX ADMIN — LIDOCAINE HYDROCHLORIDE 5 ML: 10 INJECTION, SOLUTION INFILTRATION; PERINEURAL at 13:10:00

## 2024-10-17 RX ADMIN — LIDOCAINE HYDROCHLORIDE AND EPINEPHRINE 5 ML: 15; 5 INJECTION, SOLUTION EPIDURAL at 13:10:00

## 2024-10-17 NOTE — ANESTHESIA POSTPROCEDURE EVALUATION
Patient: Corinne Denise Macnichol    Procedure Summary       Date: 10/17/24 Room / Location:     Anesthesia Start: 1310 Anesthesia Stop: 1755    Procedure: LABOR ANALGESIA Diagnosis:     Scheduled Providers:  Anesthesiologist: Miguel Zimmer MD    Anesthesia Type: epidural ASA Status: 2 - Emergent            Anesthesia Type: epidural    Vitals Value Taken Time   /65 10/17/24 1755   Temp 97.6 10/17/24 1755   Pulse 107 10/17/24 1751   Resp 14 10/17/24 1755   SpO2 100 % 10/17/24 1751   Vitals shown include unfiled device data.    [facility] AN Post Evaluation:   Patient Evaluated in PACU  Patient Participation: complete - patient participated  Level of Consciousness: awake  Pain Management: adequate  Airway Patency:patent  Dental exam unchanged from preop  Yes    Cardiovascular Status: acceptable  Respiratory Status: acceptable  Postoperative Hydration acceptable      Miguel Zimmer MD  10/17/2024 5:55 PM

## 2024-10-17 NOTE — ANESTHESIA POSTPROCEDURE EVALUATION
Patient: Corinne Denise Macnichol    Procedure Summary       Date: 10/17/24 Room / Location:     Anesthesia Start: 1310 Anesthesia Stop: 1755    Procedure: LABOR ANALGESIA Diagnosis:     Scheduled Providers:  Anesthesiologist: Miguel Zimmer MD    Anesthesia Type: epidural ASA Status: 2 - Emergent            Anesthesia Type: epidural    Vitals Value Taken Time   /65 10/17/24 1756   Temp 97.6 10/17/24 1756   Pulse 99 10/17/24 1756   Resp 14 10/17/24 1756   SpO2 97 % 10/17/24 1756   Vitals shown include unfiled device data.    [facility] AN Post Evaluation:   Patient Evaluated in PACU  Patient Participation: complete - patient participated  Level of Consciousness: awake  Pain Management: adequate  Airway Patency:patent  Dental exam unchanged from preop  Yes    Cardiovascular Status: acceptable  Respiratory Status: acceptable  Postoperative Hydration acceptable      Miguel Zimmer MD  10/17/2024 5:56 PM

## 2024-10-17 NOTE — ANESTHESIA PREPROCEDURE EVALUATION
Anesthesia PreOp Note    HPI:     Corinne Denise Macnichol is a 34 year old female who presents for preoperative consultation requested by: * No surgeons listed *    Date of Surgery: 10/17/2024    * No procedures listed *  Indication: * No pre-op diagnosis entered *    Relevant Problems   No relevant active problems       NPO:                         History Review:  Patient Active Problem List    Diagnosis Date Noted    Encounter for elective induction of labor ([facility]) 10/17/2024    Gestational hypertension, third trimester ([facility]) 10/17/2024    Pelvic pain in pregnancy ([facility]) 07/30/2024    AMA (advanced maternal age) multigravida 35+, third trimester ([facility]) 07/29/2024    Antepartum anemia ([facility]) 07/22/2024    At risk for postpartum depression 04/23/2024    Anxiety during pregnancy ([facility]) 04/23/2024    Low TSH level 04/10/2024    Nausea/vomiting in pregnancy ([facility]) 03/29/2024    Drug-induced sexual dysfunction ([facility]) 09/08/2023    Moderate episode of recurrent major depressive disorder ([facility]) 11/20/2022    Anxiety disorder 11/20/2022    Asthma ([facility]) 05/18/2022    Major depressive disorder 11/16/2021    Chronic left-sided lumbar radiculopathy 05/28/2021    Leg pain, posterior, left 03/17/2021       Past Medical History:    Amenorrhea    irreg cycles    Anxiety    2016 therapy & meds    Asthma ([facility])    Depression    therapy & meds since 2016    Fibroids    found on fertility u/s    Heart murmur    as a child. resolved now    History of chicken pox    at age 2    Hyperlipidemia    borderline     Infertility, female    IUI w/ clomid       Past Surgical History:   Procedure Laterality Date    D & c      [place] teeth removed      2012       Prescriptions Prior to Admission[1]  Current Medications and Prescriptions Ordered in Epic[2]    Allergies[3]    Family History   Problem Relation Age of Onset    Thyroid disease Father     Other (Other) Father     Diabetes Brother     Hypertension Mother     Other (Other) Mother          cataracts    COPD Maternal Grandmother     Heart Disorder Maternal Grandmother     Cancer Maternal Grandfather         prostate    Other (Other) Maternal Grandfather         emphysema    Cancer Paternal Grandmother         brain     Social History     Socioeconomic History    Marital status:      Spouse name: Peewee Mesa    Number of children: 0    Years of education: 18    Highest education level: Master's degree (e.g., MA, MS, Jocelyn, MEd, MSW, LUIS)   Occupational History    Occupation: lawfirm    Tobacco Use    Smoking status: Never    Smokeless tobacco: Never   Vaping Use    Vaping status: Never Used   Substance and Sexual Activity    Alcohol use: Not Currently     Comment: weekly    Drug use: Never   Other Topics Concern    Caffeine Concern No    Exercise No    Seat Belt Yes    Special Diet Yes     Comment: lactose free    Stress Concern No     Service No       Available pre-op labs reviewed.  Lab Results   Component Value Date    WBC 9.7 10/17/2024    RBC 4.13 10/17/2024    HGB 11.5 (L) 10/17/2024    HCT 34.9 (L) 10/17/2024    MCV 84.5 10/17/2024    MCH 27.8 10/17/2024    MCHC 33.0 10/17/2024    RDW 15.9 (H) 10/17/2024    .0 10/17/2024     Lab Results   Component Value Date     10/17/2024    K 3.8 10/17/2024     10/17/2024    CO2 22.0 10/17/2024    BUN <5 (L) 10/17/2024    CREATSERUM 0.59 10/17/2024    GLU 80 10/17/2024    CA 8.6 (L) 10/17/2024          Vital Signs:  Body mass index is 28.06 kg/m².   weight is 86.2 kg (190 lb). Her oral temperature is 97.6 °F (36.4 °C). Her blood pressure is 118/70 and her pulse is 98. Her respiration is 17 and oxygen saturation is 100%.   Vitals:    10/17/24 1600 10/17/24 1615 10/17/24 1630 10/17/24 1645   BP: 114/79 126/83 127/80 118/70   Pulse: 87 98 94 98   Resp:       Temp:       TempSrc:       SpO2: 100% 100% 100% 100%   Weight:            Anesthesia Evaluation     Patient summary reviewed and Nursing notes reviewed    Airway    Mallampati: II  TM distance: >3 FB  Neck ROM: full  Dental      Pulmonary - negative ROS and normal exam   (+) asthma  Cardiovascular - normal exam  (+) hypertension    Neuro/Psych    (+)  neuromuscular disease, anxiety/panic attacks,  depression      GI/Hepatic/Renal - negative ROS     Endo/Other    Abdominal                  Anesthesia Plan:   ASA:  2  Emergent    Plan:   Epidural  Post-op Pain Management: IV analgesics  Informed Consent Plan and Risks Discussed With:  Patient  Use of Blood Products Discussed With:  Patient  Blood Product Use Consented    Discussed plan with:  Surgeon      I have informed Corinne Denise Macnichol and/or legal guardian or family member of the nature of the anesthetic plan, benefits, risks including possible dental damage if relevant, major complications, and any alternative forms of anesthetic management.   All of the patient's questions were answered to the best of my ability. The patient desires the anesthetic management as planned.  Miguel Zimmer MD  10/17/2024 5:26 PM  Present on Admission:   AMA (advanced maternal age) multigravida 35+, third trimester ([facility])   Encounter for elective induction of labor ([facility])   Moderate episode of recurrent major depressive disorder ([facility])   Asthma (HCC)   Anxiety disorder   Major depressive disorder           [1]   Medications Prior to Admission   Medication Sig Dispense Refill Last Dose/Taking    buPROPion ER (WELLBUTRIN XL) 150 MG Oral Tablet 24 Hr Take 1 tablet (150 mg total) by mouth nightly. 90 tablet 1 10/16/2024    sertraline (ZOLOFT) 50 MG Oral Tab Take 1 tablet (50 mg total) by mouth every morning. 90 tablet 1 10/16/2024    famotidine 20 MG Oral Tab Take 1 tablet (20 mg total) by mouth as needed for Heartburn.   Past Week   [2]   Current Facility-Administered Medications Ordered in Epic   Medication Dose Route Frequency Provider Last Rate Last Admin    acetaminophen (Tylenol Extra Strength) tab 500 mg  500 mg Oral Q6H PRN  Amaya Mary CNM        acetaminophen (Tylenol Extra Strength) tab 1,000 mg  1,000 mg Oral Q6H PRN Amaya Mary CNM        ibuprofen (Motrin) tab 600 mg  600 mg Oral Once PRN Amaya Mary CNM        ondansetron (Zofran) 4 MG/2ML injection 4 mg  4 mg Intravenous Q6H PRN Sanketmariam AmayaTANIYA   4 mg at 10/17/24 0752    oxyTOCIN in sodium chloride 0.9% (Pitocin) 30 Units/500mL infusion premix  62.5-900 joy-units/min Intravenous Continuous Amaya Mary CNM        terbutaline (Brethine) 1 MG/ML injection 0.25 mg  0.25 mg Subcutaneous PRN Amaya Mary CNM        sodium citrate-citric acid (Bicitra) 500-334 MG/5ML oral solution 30 mL  30 mL Oral PRN Amaya Mary CNM        lidocaine PF (Xylocaine-MPF) 1% injection  30 mL Intradermal Once Amaya Mary CNM        lactated ringers infusion   Intravenous Continuous Amaya Mary  mL/hr at 10/17/24 0623 New Bag at 10/17/24 0623    dextrose in lactated ringers 5% infusion   Intravenous PRN Usman AmayaTANIYA        lactated ringers IV bolus 500 mL  500 mL Intravenous PRN Amaya Mary  mL/hr at 10/17/24 1332 500 mL at 10/17/24 1332    oxyTOCIN in sodium chloride 0.9% (Pitocin) 30 Units/500mL infusion premix  0.5-20 joy-units/min Intravenous Continuous Amaya Mary CNM 12 mL/hr at 10/17/24 1055 12 joy-units/min at 10/17/24 1055    fentaNYL-bupivacaine 2 mcg/mL-0.125% in sodium chloride 0.9% 100 mL EPIDURAL infusion premix   Epidural Continuous Miguel Zimmer MD        fentaNYL (Sublimaze) 50 mcg/mL injection 100 mcg  100 mcg Epidural Once Miguel Zimmer MD        bupivacaine PF (Marcaine) 0.25% injection  20 mL Epidural Once Miguel Zimmer MD        EPHEDrine (PF) 25 MG/5 ML injection 5 mg  5 mg Intravenous PRN Miguel Zimmer MD   5 mg at 10/17/24 1328    nalbuphine (Nubain) 10 mg/mL injection 2.5 mg  2.5 mg Intravenous Q15 Min PRN Miguel Zimmer MD   2.5 mg at 10/17/24 1540    albuterol (Ventolin HFA) 108 (90 Base)  MCG/ACT inhaler 2 puff  2 puff Inhalation Q4H PRN Anu Howe CNM         No current [facility]-ordered outpatient medications on file.   [3] No Known Allergies

## 2024-10-17 NOTE — PROGRESS NOTES
[facility]  part of [facility]    Labor Progress Note    Corinne Denise Macnichol Patient Status:  Inpatient    10/5/1990 MRN [id number]   Location [facility] BIRTH [facility] Attending Amaya Mary CNM   Hosp Day # 0 PCP Aliyah Mckinney MD     Subjective:   Interval History:   Corinne is here for an elective induction. She states she is uncomfortable with some contractions but is coping well through them. She desires an epidural prior to moving forward with AROM.     Corinne pushed for 3 hours with her first delivery and denies any complications at delivery.      Objective:   Vital signs in last 24 hours:  Temp:  [97.9 °F (36.6 °C)-98.7 °F (37.1 °C)] 97.9 °F (36.6 °C)  Pulse:  [70-99] 70  Resp:  [18-19] 18  BP: (117-137)/(65-92) 117/65    Fetal Surveillance:   Fetal Heart Tones (FHTs): 125 with moderate variability, accelerations present 15x15, decelerations absent  Uterine contractions (Ucs): 2.5-4 minutes    Sterile Vaginal Exam (SVE):   Deferred until after epidural. Last exam 3.5//-2 at 0600 per RN    Other Measures:   Estimated Fetal Weight (EFW): 3600g. Pelvis proven to 3290g  Pitocin at 12mUnits  Epidural: completed      Results:   Lab Results   Component Value Date    TREPONEMALAB Nonreactive 10/17/2024    ABO O 10/17/2024    RH Positive 10/17/2024    WBC 9.7 10/17/2024    HGB 13.3 10/17/2024    HCT 40.0 10/17/2024    .0 10/17/2024    CREATSERUM 0.74 2022    BUN 8 2022     (L) 2022    K 3.5 2022     2022    CO2 19.0 (L) 2022    GLU 83 2022    CA 9.4 2022    ALB 2.9 (L) 2022    ALKPHO 302 (H) 2022    BILT 0.5 2022    TP 7.4 2022    AST 26 2022    ALT 21 2022    T4F 1.3 2024    TSH 1.043 2024     2022    GGT 17 2022       Assessment & Plan:     Encounter for elective induction of labor ([facility])  Corinne is 34 year old, , with current  EGA of 39w3d, here for elective IOL  Pitocin protocol 2 infusing  AROM now - clear fluid  Category 1 FHR Tracing  Anticipate vaginal delivery      Major depressive disorder w/ Anxiety  Stable   Taking Wellbutrin and Zoloft  Sees  specialist and has postpartum appointment scheduled for next week      Asthma (HCC)  Mild, intermittent, Not currently taking medication  Will order rescue inhaler to bedside      AMA (advanced maternal age) multigravida 35+, third trimester (HCC)  Normal AP testing      Plan discussed with patient who verbalizes understanding and agreement.    Filomena FOX under direct supervision of Anu Howe CNM    Patient seen in conjunction with DOMINIQUE. I personally witnessed the patient's exam and medical decision making on this date of service.  I was physically present in the room for the performance of the E/M service.  I have reviewed the TANIYA student's documentation and findings including history, Exam, and Medical Decision Making, edited the document for accuracy and verify that it represents the clinical findings and services performed.      Anu Howe CNM  10/17/2024  Caring for patient [id number]

## 2024-10-17 NOTE — PROGRESS NOTES
Pt is a 34 year old female admitted to R1/[facility].     Chief Complaint   Patient presents with    Scheduled Induction      Pt is  39w3d intra-uterine pregnancy.  History obtained, consents signed. Oriented to room, staff, and plan of care.

## 2024-10-17 NOTE — PLAN OF CARE
Problem: Patient Centered Care  Goal: Patient preferences are identified and integrated in the patient's plan of care  Description: Interventions:  - What would you like us to know as we care for you? Baby #2  - Provide timely, complete, and accurate information to patient/family  - Incorporate patient and family knowledge, values, beliefs, and cultural backgrounds into the planning and delivery of care  - Encourage patient/family to participate in care and decision-making at the level they choose  - Honor patient and family perspectives and choices  Outcome: Progressing     Problem: Patient/Family Goals  Goal: Patient/Family Long Term Goal  Description: Patient's Long Term Goal: uncomplicated vaginal delivery    Interventions:  - See additional Care Plan goals for specific interventions  Outcome: Progressing  Goal: Patient/Family Short Term Goal  Description: Patient's Short Term Goal: pain control    Interventions:   - epidural  - See additional Care Plan goals for specific interventions  Outcome: Progressing     Problem: BIRTH - VAGINAL/ SECTION  Goal: Fetal and maternal status remain reassuring during the birth process  Description: INTERVENTIONS:  - Monitor vital signs  - Monitor fetal heart rate  - Monitor uterine activity  - Monitor labor progression (vaginal delivery)  - DVT prophylaxis (C/S delivery)  - Surgical antibiotic prophylaxis (C/S delivery)  Outcome: Progressing     Problem: PAIN - ADULT  Goal: Verbalizes/displays adequate comfort level or patient's stated pain goal  Description: INTERVENTIONS:  - Encourage pt to monitor pain and request assistance  - Assess pain using appropriate pain scale  - Administer analgesics based on type and severity of pain and evaluate response  - Implement non-pharmacological measures as appropriate and evaluate response  - Consider cultural and social influences on pain and pain management  - Manage/alleviate anxiety  - Utilize distraction and/or relaxation  techniques  - Monitor for opioid side effects  - Notify MD/LIP if interventions unsuccessful or patient reports new pain  - Anticipate increased pain with activity and pre-medicate as appropriate  Outcome: Progressing     Problem: ANXIETY  Goal: Will report anxiety at manageable levels  Description: INTERVENTIONS:  - Administer medication as ordered  - Teach and rehearse alternative coping skills  - Provide emotional support with 1:1 interaction with staff  Outcome: Progressing

## 2024-10-17 NOTE — PROGRESS NOTES
[facility]  part of [facility]    Labor Progress Note    Corinne Denise Macnichol Patient Status:  Inpatient    10/5/1990 MRN [id number]   Location [facility] CENTER Attending Amaya Mary CNM   Hosp Day # 0 PCP Aliyah Mckinney MD     Subjective:   Interval History:   Corinne is comfortable with her epidural. She is feeling intermittent perineal pressure. She's experiencing some dizziness while sitting in high fowlers, vital signs WNL, assisted to left lateral position and given a popsicle.     Objective:   Vital signs in last 24 hours:  Temp:  [97.6 °F (36.4 °C)-98.7 °F (37.1 °C)] 97.6 °F (36.4 °C)  Pulse:  [] 98  Resp:  [17-20] 17  BP: ()/(27-92) 126/83  SpO2:  [99 %-100 %] 100 %    Input/Output:    Intake/Output Summary (Last 24 hours) at 10/17/2024 1640  Last data filed at 10/17/2024 1400  Gross per 24 hour   Intake 500 ml   Output --   Net 500 ml       Fetal Surveillance:   Fetal Heart Tones (FHTs): 120 with moderate variability, accelerations 15x15, decelerations present - variable & early  Uterine contractions (Ucs): q2-3min    Sterile Vaginal Exam (SVE):   6-7/80/-1    Other Measures:   Pitocin at 12mUnits  Epidural: infusing   Mcclellan: to gravity      Assessment & Plan:     Encounter for elective induction of labor (HCC)  Corinne is 34 year old, , with current EGA of 39w3d, here for elective induction of labor  Category 2 tracing  Making good progress  Continue current management plan  Will reevaluate dizziness      Gestational hypertension, third trimester (HCC)  Preeclamptic labs WNL          Major depressive disorder    Asthma (HCC)    Moderate episode of recurrent major depressive disorder (HCC)    Anxiety disorder    AMA (advanced maternal age) multigravida 35+, third trimester (HCC)          Plan discussed with patient who verbalizes understanding and agreement.    Filomena FOX under direct supervision of Anu Howe CNM      Patient seen in conjunction with SNM. I personally witnessed the patient's exam and medical decision making on this date of service.  I was physically present in the room for the performance of the E/M service.  I have reviewed the CNM student's documentation and findings including history, Exam, and Medical Decision Making, edited the document for accuracy and verify that it represents the clinical findings and services performed.      Anu Howe CNM  10/17/2024

## 2024-10-17 NOTE — L&D DELIVERY NOTE
Moshe Girl [[id number]]      Labor Events     labor?: No   steroids?: None  Antibiotics received during labor?: No  Rupture date/time: 10/17/2024 1342     Rupture type: AROM  Fluid color: Clear  Labor type: Induced Onset of Labor  Induction: Oxytocin, AROM  Indications for induction: Elective  Intrapartum & labor complications: Variable decelerations       Labor Length    1st stage: 3h 16m  2nd stage: 0h 50m  3rd stage: 0h 06m       Labor Event Times    Labor onset date/time: 10/17/2024 1342  Dilation complete date/time: 10/17/2024 1658  Start pushing date/time: 10/17/2024 165        Presentation    Presentation: Vertex  Position: Right Occiput Anterior       Operative Delivery    Operative Vaginal Delivery: No                Shoulder Dystocia    Shoulder Dystocia: No       Anesthesia    Method: Epidural   Analgesics:  Analgesics   FENTANYL CIT-BUPIVACAINE HCL 2-0.125 MCG/ML-% EP SOLN              Delivery      Head delivery date/time: 10/17/2024 17:47:58   Delivery date/time:  10/17/24 17:48:07   Delivery type: Normal spontaneous vaginal delivery    Details:     Delivery location: delivery room  Delivery Room Temperature: 71       Delivery Providers    Delivering Clinician: Anu Howe CNM   Delivery personnel:  Provider Role   Joy Chaudhry, RN Baby Nurse   Cayla Kapadia, RN Delivery Nurse   Delmy Amaro, RN Delivery Nurse   Myrtle Fuentes Surgical Tech   Gabbie Garcia, REBECCA Charge Nurse             Cord    Vessels: 3 Vessels  Complications: Nuchal  # of loops: 1  Timed cord clamping: Yes  Time in sec: 120  Cord blood disposition: to lab  Gases sent?: Yes       Resuscitation    Method: None       [place] Measurements      Weight: 3810 g 8 lb 6.4 oz Length: 51 cm     Head circum.: 35.5 cm              Placenta    Date/time: 10/17/2024 1755  Removal: Spontaneous  Appearance: Intact       Apgars    Living status: Living   Apgar Scoring Key:    0 1 2    Skin color Blue  or pale Acrocyanotic Completely pink    Heart rate Absent <100 bpm >100 bpm    Reflex irritability No response Grimace Cry or active withdrawal    Muscle tone Limp Some flexion Active motion    Respiratory effort Absent Weak cry; hypoventilation Good, crying              1 Minute:  5 Minute:  10 Minute:  15 Minute:  20 Minute:      Skin color: 1  1       Heart rate: 2  2       Reflex irritablity: 2  2       Muscle tone: 2  2       Respiratory effort: 2  2       Total: 9  9          Apgars assigned by: CARLEY  [place] disposition: with mother       Skin to Skin    Skin to skin initiated date/time: 10/17/2024 1748  Skin to skin with: Mother       Vaginal Count    Initial count RN: Delmy Amaro RN  Initial count Tech: Fuentes, Myrtle   Sponges   Sharps    Initial counts 10   0    Final counts 10   1    Final count RN: Delmy Amaro RN  Final count MD: Anu Howe CNM       Lacerations    Episiotomy: None  Perineal lacerations: 2nd Repaired?: Yes     Vaginal laceration?: No      Cervical laceration?: No    Clitoral laceration?: No    Quantitative blood loss (mL): 924                [facility]  part of [facility]    Vaginal Delivery Note    Corinne Roxie Moshe Patient Status:  Inpatient    10/5/1990 MRN [id number]   Location [facility] CENTER Attending Amaya Mary CNM   Hosp Day # 1 PCP Aliyah Mckinney MD     Delivery     Infant  Date of Delivery: 10/17/2024   Time of Delivery: 5:48 PM  Delivery Type: Normal spontaneous vaginal delivery    Infant Sex/Birthweight: female 8 lb 6.4 oz (3.81 kg)    Presentation Vertex [1]  Position Right [3] Occiput [1] Anterior [1]    Apgars:  1 minute: 9               5 minutes: 9                        10 minutes:      Placenta  Date/Time of Delivery: 10/17/2024  5:55 PM   Delivery: spontaneous  Placenta to Pathology: no  Cord Gases Submitted: yes  Cord Blood Collection: yes  Cord Tissue Collection: yes  Cord Complications: single  nuchal  Sponge and Needle Counts:  Verified yes    Maternal Anesthesia: epidural   Episiotomy/Laceration Repair  Laceration: perineal 2nd degree    Delivery Complications  none    Neonatologist Present: no  Delivery Comment:   Corinne progressed to complete dilatation and +1 station prior to pushing successfully to viable baby girl. See Delivery Summary for time, APGARs, and weight. Fetal head presented in the SHUBHAM position. Sweep for nuchal cord was performed and no nuchal cord identified. Anterior shoulder delivered spontaneously without traction. Baby vigorous at birth. To maternal abdomen for dry and stimulation then cord cut after pulsing ceased. Prophylactic IV pitocin initiated in 3rd stage. Spontaneous placenta by sotomayor mechanism, complete with 3 vessel cord. Hemostasis achieved by fundal massage and prophylactic IV Pitocin. Vagina and perineum inspected and 2nd degree laceration repaired with 2.0 chromic under epidural anesthesia. Mother and infant appeared in stable condition when midwife left the delivery room. Sharps and 4x4 counts were correct. Skin to skin initiated.    Quantitative Blood Loss (mL) 924    Filomena FOX in collaboration with and under direct supervision of Anu Howe CNM.     Patient seen in conjunction with DOMINIQUE. I personally witnessed the patient's exam and medical decision making on this date of service.  I was physically present in the room for the performance of the E/M service.  I have reviewed the TANIYA student's documentation and findings including history, Exam, and Medical Decision Making, edited the document for accuracy and verify that it represents the clinical findings and services performed.      Anu Howe CNM   10/17/2024  6:15 PM

## 2024-10-17 NOTE — ANESTHESIA PROCEDURE NOTES
Labor Analgesia    Date/Time: 10/17/2024 1:10 PM    Performed by: Miguel Zimmer MD  Authorized by: Miguel Zimmer MD      General Information and Staff    Start Time:  10/17/2024 1:10 PM  End Time:  10/17/2024 1:21 PM  Anesthesiologist:  Miguel Zimmer MD  Performed by:  Anesthesiologist  Patient Location:  OB  Site Identification: surface landmarks    Reason for Block: labor epidural    Preanesthetic Checklist: patient identified, IV checked, risks and benefits discussed, monitors and equipment checked, pre-op evaluation, timeout performed, anesthesia consent and sterile technique used      Procedure Details    Patient Position:  Sitting  Prep: Betadine and patient draped    Monitoring:  Heart rate, cardiac monitor and continuous pulse ox  Approach:  Midline    Epidural Needle    Injection Technique:  SONIA air  Needle Type:  Tuohy  Needle Gauge:  18 G  Needle Length:  3.375 in  Needle Insertion Depth:  7  Location:  L3-4    Spinal Needle      Catheter    Catheter Type:  Side hole  Catheter Size:  20 G  Catheter at Skin Depth:  14    Assessment    Sensory Level:  T8    Additional Comments

## 2024-10-17 NOTE — H&P
[facility]  part of [facility]    History & Physical    Corinne Denise Macnichol Patient Status:  Inpatient    10/5/1990 MRN [id number]   Location [facility] Attending Amaya Mary CNM   Hosp Day # 0 Admitting Ines Gilbert MD     Date of Admission:  10/17/2024      HPI:   Corinne Denise Macnichol is 34 year old and  with current gestational age of 39w3d and estimated due date of 10/21/2024, by Ultrasound consistent with 7w4d    Corinne is being admitted for induction of labor.    Her current obstetrical history is significant for advanced maternal age, hx of depression..    Patient reports no complaints .     Fetal Movement: normal.     History   Obstetric History:   OB History    Para Term  AB Living   3 1 1 0 1 1   SAB IAB Ectopic Multiple Live Births   1 0 0 0 1      # Outcome Date GA Lbr Castro/2nd Weight Sex Type Anes PTL Lv   3 Current            2 Term 22 39w0d 11:28 / 03:03 7 lb 4.1 oz (3.29 kg) M NORMAL SPONT EPI N MELVIN      Complications: Variable decelerations, Late decelerations   1 SAB 2020 8w0d             Birth Comments: D&C 2020     Past Medical History:   Past Medical History:    Amenorrhea    irreg cycles    Anxiety    2016 therapy & meds    Asthma (HCC)    Depression    therapy & meds since 2016    Fibroids    found on fertility u/s    Heart murmur    as a child. resolved now    History of chicken pox    at age 2    Hyperlipidemia    borderline     Infertility, female    IUI w/ clomid     Past Social History:   Past Surgical History:   Procedure Laterality Date    D & c      [place] teeth removed           Family History:   Family History   Problem Relation Age of Onset    Thyroid disease Father     Other (Other) Father     Diabetes Brother     Hypertension Mother     Other (Other) Mother         cataracts    COPD Maternal Grandmother     Heart Disorder Maternal Grandmother     Cancer Maternal Grandfather          prostate    Other (Other) Maternal Grandfather         emphysema    Cancer Paternal Grandmother         brain     Social History:   Social History     Tobacco Use    Smoking status: Never    Smokeless tobacco: Never   Substance Use Topics    Alcohol use: Not Currently     Comment: weekly        Allergies/Medications:   Allergies:   Allergies[1]  Medications:  Prescriptions Prior to Admission[2]    Review of Systems:   Constitutional: denies fever, aches, chills  HEENT: denies visual changes  Skin: denies any unusual skin lesions or itching  Lungs: denies shortness of breath  Cardiovascular: denies chest pain  GI: denies abdominal pain,denies heartburn, denies constipation or diarrhea  : denies dysuria, pelvic pain, vaginal discharge or bleeding  Musculoskeletal: denies back or joint pain  Neuro denies headaches or dizziness  Psych: denies depression or anxiety    Physical Exam:   Temp:  [98.7 °F (37.1 °C)] 98.7 °F (37.1 °C)  Pulse:  [92-99] 92  Resp:  [19] 19  BP: (129-134)/(91-92) 129/91    Constitutional: alert, appears stated age, and cooperative  Skin: no lesions or erythema  Respiratory: clear, unlabored  Cardiac: regular rate and rhythm   Abdomen: soft, non-tender, EFW 7  Fetal Surveillance:  140 BPM; Fetal heart variability: moderate  Fetal Heart Rate decelerations: none  Fetal Heart Rate accelerations: yes  Uterine irritability: yes  Pelvis: Gynecoid  External Genitalia:  No lesions  Sterile vaginal exam: deferred  Per RN 3/70/-2 cephalic  Extremities: extremities normal, atraumatic, no cyanosis or edema  Musculoskeletal: steady gait  Reflexes: +1/+1  Psych:  Appropriate affect and speech    Results:     Prenatal Results       Initial       Test Value Reference Range Date Time    Blood Type (ABO Group)  O   10/17/24 0535    Rh Factor  Positive   10/17/24 0535    Antibody Screen (Required questions in OE to answer)  Negative   04/08/24 1049    HCT  38.3 % 35.0 - 48.0 04/08/24 1049    HGB  13.1 g/dL 12.0  - 16.0 04/08/24 1049    MCV  78.8 fL 80.0 - 100.0 04/08/24 1049    Platelets  284.0 10(3)uL 150.0 - 450.0 04/08/24 1049    Rubella  Positive  Positive 04/08/24 1049    TREP  Nonreactive  Nonreactive  04/08/24 1049    RPR ([facility])        Urine Culture  No Growth at 18-24 hrs.   04/08/24 1049    Hepatitis B  Nonreactive  Nonreactive  04/08/24 1049    HIV Combo  Non-Reactive  Non-Reactive 04/08/24 1049    HCV  Nonreactive  Nonreactive  04/08/24 1049              Optional Initial Labs       Test Value Reference Range Date Time    TSH  0.820 mIU/mL 0.550 - 4.780 06/19/24 0844       0.909 mIU/mL 0.550 - 4.780 04/30/24 1134       0.064 mIU/mL 0.550 - 4.780 04/08/24 1049    Pap Smear  Negative for intraepithelial lesion or malignancy  Negative for intraepithelial lesion or malignancy 08/16/22 1552    HPV  Negative  Negative 08/16/22 1552    GC DNA        Chlamydia DNA        GTT 1 Hr        Glucose Fasting        Glucose 1 Hr        Glucose 2 Hr        Glucose 3 Hr        HgB A1c  5.0 % <5.7 04/08/24 1049    Vitamin D                  8-20 Weeks       Test Value Reference Range Date Time    1st Trimester Aneuploidy Risk Assessment        Quad - Down Screen Risk Estimate - prior to 02/20/18        Quad - Down Maternal Age Risk - prior to 02/20/18        Quad - Trisomy 18 screen Risk Estimate - prior to 02/20/18        AFP Spina Bifida (Required questions in OE to answer )        QUAD/AFP - Interpretation        Free Fetal DNA         Genetic testing        Genetic testing        Genetic testing        GC DNA        Chlamydia DNA                  24-28 Weeks       Test Value Reference Range Date Time    HCT  32.0 % 35.0 - 48.0 07/19/24 0910    HGB  10.7 g/dL 12.0 - 16.0 07/19/24 0910    Platelets  260.0 10(3)uL 150.0 - 450.0 07/19/24 0910    GTT 1 Hr  113 mg/dL See comment 07/19/24 0910    Glucose Fasting        Glucose 1 Hr        Glucose 2 Hr        Glucose 3 Hr        TSH  0.820 mIU/mL 0.550 - 4.780 06/19/24 0844      Profile        Urine Culture        GC DNA        Chlamydia DNA                  35-37 Weeks       Test Value Reference Range Date Time    HCT  40.0 % 35.0 - 48.0 10/17/24 0535    HGB  13.3 g/dL 12.0 - 16.0 10/17/24 0535    Platelets  250.0 10(3)uL 150.0 - 450.0 10/17/24 0535    TREP  Nonreactive  Nonreactive  10/17/24 0535       Nonreactive  Nonreactive  24 1032    RPR ([facility])        Genital Group B Culture  Negative  Negative 24 1301    TSH        Urine Culture  No Growth at 18-24 hrs.   24 1431    HIV Combo  Non-Reactive  Non-Reactive 24 1032    GC DNA        Chlamydia DNA        Rapid HIV                  Optional Labs       Test Value Reference Range Date Time    HgB A1c  5.0 % <5.7 24 1049    HGB Electrophoresis  (See Report)    24 1049    Varicella Zoster  2,564.00  >165.00 24 1049    Cystic Fibrosis-Old         Cystic Fibrosis[32] (Required questions in OE to answer)        Cystic Fibrosis[165] (Required questions in OE to answer)        Cystic Fibrosis[165] (Required questions in OE to answer)        Cystic Fibrosis[165] (Required questions in OE to answer)        Sickle Cell        24Hr Urine Protein        24Hr Urine Creatinine        Parvo B19 IgM        Parvo B19 IgG                  Legend    ^: Historical                            Reviewed all prenatal ultrasounds    Admit labs pending    Assessment/Plan:   Corinne is 34 year old and  with current gestational age of 39w3d and estimated due date of 10/21/2024, by Ultrasound consistent with 7w4d    Not in labor.  Category 1 FHR tracing  Obstetrical history significant for advanced maternal age, hx of depression .    Admission problem(s):    Encounter for induction of labor (HCC)   Pitocin protocol 2      Moderate episode of recurrent major depressive disorder (HCC)        AMA (advanced maternal age) multigravida 35+, third trimester ([facility])            Risks, benefits, alternatives and possible  complications have been discussed in detail with the patient.   Pre-admission, admission, and post admission procedures and expectations were discussed in detail.    All questions answered, all appropriate consents will be signed at the Hospital. Admission is planned for today.   Ambulate, monitored., Expectant management., and Anticipate vaginal delivery..    Amaya ColesTANIYA becerra  10/17/2024  6:55 AM         [1] No Known Allergies  [2]   Medications Prior to Admission   Medication Sig Dispense Refill Last Dose/Taking    buPROPion ER (WELLBUTRIN XL) 150 MG Oral Tablet 24 Hr Take 1 tablet (150 mg total) by mouth nightly. 90 tablet 1 10/16/2024    sertraline (ZOLOFT) 50 MG Oral Tab Take 1 tablet (50 mg total) by mouth every morning. 90 tablet 1 10/16/2024    famotidine 20 MG Oral Tab Take 1 tablet (20 mg total) by mouth as needed for Heartburn.   Past Week

## 2024-10-18 PROBLEM — D64.89 OTHER SPECIFIED ANEMIAS: Status: ACTIVE | Noted: 2024-10-18

## 2024-10-18 LAB
ALBUMIN SERPL-MCNC: 3.5 G/DL (ref 3.2–4.8)
ALBUMIN/GLOB SERPL: 1.7 {RATIO} (ref 1–2)
ALP LIVER SERPL-CCNC: 187 U/L
ALT SERPL-CCNC: 13 U/L
ANION GAP SERPL CALC-SCNC: 6 MMOL/L (ref 0–18)
AST SERPL-CCNC: 26 U/L (ref ?–34)
BASOPHILS # BLD AUTO: 0.02 X10(3) UL (ref 0–0.2)
BASOPHILS NFR BLD AUTO: 0.2 %
BILIRUB SERPL-MCNC: 0.3 MG/DL (ref 0.3–1.2)
BUN BLD-MCNC: <5 MG/DL (ref 9–23)
CALCIUM BLD-MCNC: 8.8 MG/DL (ref 8.7–10.4)
CHLORIDE SERPL-SCNC: 112 MMOL/L (ref 98–112)
CO2 SERPL-SCNC: 21 MMOL/L (ref 21–32)
CREAT BLD-MCNC: 0.64 MG/DL
CREAT UR-SCNC: 29.9 MG/DL
DEPRECATED RDW RBC AUTO: 48.6 FL (ref 35.1–46.3)
EGFRCR SERPLBLD CKD-EPI 2021: 118 ML/MIN/1.73M2 (ref 60–?)
EOSINOPHIL # BLD AUTO: 0.07 X10(3) UL (ref 0–0.7)
EOSINOPHIL NFR BLD AUTO: 0.6 %
ERYTHROCYTE [DISTWIDTH] IN BLOOD BY AUTOMATED COUNT: 15.9 % (ref 11–15)
GLOBULIN PLAS-MCNC: 2.1 G/DL (ref 2–3.5)
GLUCOSE BLD-MCNC: 111 MG/DL (ref 70–99)
HCT VFR BLD AUTO: 31.4 %
HGB BLD-MCNC: 10.4 G/DL
IMM GRANULOCYTES # BLD AUTO: 0.06 X10(3) UL (ref 0–1)
IMM GRANULOCYTES NFR BLD: 0.5 %
LYMPHOCYTES # BLD AUTO: 1.61 X10(3) UL (ref 1–4)
LYMPHOCYTES NFR BLD AUTO: 13.8 %
MCH RBC QN AUTO: 27.6 PG (ref 26–34)
MCHC RBC AUTO-ENTMCNC: 33.1 G/DL (ref 31–37)
MCV RBC AUTO: 83.3 FL
MONOCYTES # BLD AUTO: 0.67 X10(3) UL (ref 0.1–1)
MONOCYTES NFR BLD AUTO: 5.7 %
NEUTROPHILS # BLD AUTO: 9.23 X10 (3) UL (ref 1.5–7.7)
NEUTROPHILS # BLD AUTO: 9.23 X10(3) UL (ref 1.5–7.7)
NEUTROPHILS NFR BLD AUTO: 79.2 %
PLATELET # BLD AUTO: 189 10(3)UL (ref 150–450)
POTASSIUM SERPL-SCNC: 4 MMOL/L (ref 3.5–5.1)
PROT SERPL-MCNC: 5.6 G/DL (ref 5.7–8.2)
PROT UR-MCNC: <6 MG/DL (ref ?–14)
RBC # BLD AUTO: 3.77 X10(6)UL
SODIUM SERPL-SCNC: 139 MMOL/L (ref 136–145)
WBC # BLD AUTO: 11.7 X10(3) UL (ref 4–11)

## 2024-10-18 RX ORDER — FERROUS SULFATE 325(65) MG
325 TABLET, DELAYED RELEASE (ENTERIC COATED) ORAL
Status: DISCONTINUED | OUTPATIENT
Start: 2024-10-18 | End: 2024-10-19

## 2024-10-18 RX ORDER — ALBUTEROL SULFATE 90 UG/1
2 INHALANT RESPIRATORY (INHALATION) EVERY 4 HOURS PRN
Status: DISCONTINUED | OUTPATIENT
Start: 2024-10-18 | End: 2024-10-19

## 2024-10-18 NOTE — LACTATION NOTE
This note was copied from a baby's chart.     10/18/24 6933   Evaluation Type   Evaluation Type Inpatient   Problems & Assessment   Problems Diagnosed or Identified Shallow latch   Infant Assessment Hunger cues present   Muscle tone Appropriate for GA   Feeding Assessment   Summary Current Feeding Breastfeeding exclusively   Breastfeeding Assessment Assisted with breastfeeding w/mother's permission;Pulling on nipple;Calm and ready to breastfeed;Tolerated feeding well;Coordinated suck/swallow;Sustained nutrititive latch w/audible swallows   Breastfeeding lasted # of minutes 20   Breastfeeding Positions cross cradle;laid back;right breast;left breast   Latch 1   Audible Sucks/Swallows 2   Type of Nipple 2   Comfort (Breast/Nipple) 1   Hold (Positioning) 1   LATCH Score 7   Other (comment) Discussed that handbook, skin to skin, hand massage and expression, mom said that she feels that infant has a shallow latch, assisted with a latch in cross cradle position to the left breast, infant wants to latch to the end of her nipple, discussed deep latch techniques, relatched infant and she was able to attain a deep latch, mom said that it felt much better, then infant was able to latch to the other side with a deep latch, informed of the [facility] and encouraged to call if needed.

## 2024-10-18 NOTE — PROGRESS NOTES
Patient up to bathroom with assist x 2. Unable to void at this time. Patient transferred to mother/baby room 352 per wheelchair in stable condition with baby and personal belongings.  Accompanied by significant other and staff.  Report given to Karla mother/baby REBECCA.

## 2024-10-18 NOTE — PLAN OF CARE
Problem: Patient Centered Care  Goal: Patient preferences are identified and integrated in the patient's plan of care  Description: Interventions:  - What would you like us to know as we care for you?   - Provide timely, complete, and accurate information to patient/family  - Incorporate patient and family knowledge, values, beliefs, and cultural backgrounds into the planning and delivery of care  - Encourage patient/family to participate in care and decision-making at the level they choose  - Honor patient and family perspectives and choices  Outcome: Progressing     Problem: POSTPARTUM  Goal: Long Term Goal:Experiences normal postpartum course  Description: INTERVENTIONS:  - Assess and monitor vital signs and lab values.  - Assess fundus and lochia.  - Provide ice/sitz baths for perineum discomfort.  - Monitor healing of incision/episiotomy/laceration, and assess for signs and symptoms of infection and hematoma.  - Assess bladder function and monitor for bladder distention.  - Provide/instruct/assist with pericare as needed.  - Provide VTE prophylaxis as needed.  - Monitor bowel function.  - Encourage ambulation and provide assistance as needed.  - Assess and monitor emotional status and provide social service/psych resources as needed.  - Utilize standard precautions and use personal protective equipment as indicated. Ensure aseptic care of all intravenous lines and invasive tubes/drains.  - Obtain immunization and exposure to communicable diseases history.  Outcome: Progressing  Goal: Optimize infant feeding at the breast  Description: INTERVENTIONS:  - Initiate breast feeding within first hour after birth.   - Monitor effectiveness of current breast feeding efforts.  - Assess support systems available to mother/family.  - Identify cultural beliefs/practices regarding lactation, letdown techniques, maternal food preferences.  - Assess mother's knowledge and previous experience with breast feeding.  - Provide  information as needed about early infant feeding cues (e.g., rooting, lip smacking, sucking fingers/hand) versus late cue of crying.  - Discuss/demonstrate breast feeding aids (e.g., infant sling, nursing footstool/pillows, and breast pumps).  - Encourage mother/other family members to express feelings/concerns, and actively listen.  - Educate father/SO about benefits of breast feeding and how to manage common lactation challenges.  - Recommend avoidance of specific medications or substances incompatible with breast feeding.  - Assess and monitor for signs of nipple pain/trauma.  - Instruct and provide assistance with proper latch.  - Review techniques for milk expression (breast pumping) and storage of breast milk. Provide pumping equipment/supplies, instructions and assistance, as needed.  - Encourage rooming-in and breast feeding on demand.  - Encourage skin-to-skin contact.  - Provide LC support as needed.  - Assess for and manage engorgement.  - Provide breast feeding education handouts and information on community breast feeding support.   Outcome: Progressing  Goal: Establishment of adequate milk supply with medication/procedure interruptions  Description: INTERVENTIONS:  - Review techniques for milk expression (breast pumping).   - Provide pumping equipment/supplies, instructions, and assistance until it is safe to breastfeed infant.  Outcome: Progressing  Goal: Appropriate maternal -  bonding  Description: INTERVENTIONS:  - Assess caregiver- interactions.  - Assess caregiver's emotional status and coping mechanisms.  - Encourage caregiver to participate in  daily care.  - Assess support systems available to mother/family.  - Provide /case management support as needed.  Outcome: Progressing     Problem: GENITOURINARY - ADULT  Goal: Absence of urinary retention  Description: INTERVENTIONS:  - Assess patient’s ability to void and empty bladder  - Monitor intake/output and  perform bladder scan as needed  - Follow urinary retention protocol/standard of care  - Consider collaborating with pharmacy to review patient's medication profile  - Implement strategies to promote bladder emptying  Outcome: Progressing

## 2024-10-18 NOTE — LACTATION NOTE
10/18/24 1130   Evaluation Type   Evaluation Type Inpatient   Problems identified   Problems identified Knowledge deficit   Maternal history   Maternal history AMA;Induction of labor;Depression   Breastfeeding goal   Breastfeeding goal To maintain breast milk feeding per patient goal   Maternal Assessment   Bilateral Breasts Symmetrical;Soft   Bilateral Nipples Everted;Colostrum easily expressed;Elastic   Prior breastfeeding experience (comment below) Multip;Successful   Prior BF experience: comment Hx BF 18 months   Breastfeeding Assistance Breastfeeding assistance provided with permission;Breast exam provided with permission;Hand expression provided with permission   Pain assessment   Pain, additional Pain w/initial sucks only   Treatment of Sore Nipples Deeper latch techniques;Expressed breast milk;Lanolin   Guidelines for use of:   Breast pump type Lizette;Medela Pump In Style MaxFlow   Current use of pump: not indicated at this time   Other (comment) Discussed that handbook, skin to skin, hand massage and expression, mom said that she feels that infant has a shallow latch, assisted with a latch in cross cradle position to the left breast, infant wants to latch to the end of her nipple, discussed deep latch techniques, relatched infant and she was able to attain a deep latch, mom said that it felt much better, then infant was able to latch to the other side with a deep latch, informed of the [place] [facility] and encouraged to call if needed.

## 2024-10-18 NOTE — PROGRESS NOTES
Postpartum Day 1 Progress Note    Subjective:    Corinne Denise Macnichol  is a 34 year old   s/p     Feeling well, denies dizziness, voiding without difficulty, bleeding is decreasing.    Currently lives with  who is supportive and involved.  Has support from parents and sisters  Has Car seat     Feeding Method: Breast exclusively, has pump  Contraceptive Plan: mIUD    Medical/Pregnancy complications:   Patient Active Problem List   Diagnosis    Leg pain, posterior, left    Chronic left-sided lumbar radiculopathy    Major depressive disorder    Asthma ([facility])    Moderate episode of recurrent major depressive disorder ([facility])    Anxiety disorder    Drug-induced sexual dysfunction ([facility])    Nausea/vomiting in pregnancy ([facility])    Low TSH level    At risk for postpartum depression    Anxiety during pregnancy ([facility])    Antepartum anemia ([facility])    AMA (advanced maternal age) multigravida 35+, third trimester ([facility])    Pelvic pain in pregnancy ([facility])    Encounter for elective induction of labor ([facility])    Gestational hypertension, third trimester ([facility])     (spontaneous vaginal delivery) ([facility])        Review of Systems   Constitutional:  No fever.    Breast:  Nipples intact, Non tender, No engorgement, No redness.         Nipple discharge: Small amount (Milky).    Genitourinary:  Able to Void without difficulty.    Gynecologic:  Small bleeding without clots.  Labia without swelling or bruising  Psychiatric:  No depression.        Date of Delivery: 10/17/2024   Time of Delivery:5:48 PM     Objective   Allergies: Patient has no known allergies.    Lab Results  Blood type:  O +  PP Hemaglobin: 10.4  Rubella: Immune  HIV: NR x 2   Syphilis: NR x 3  GBS: Negative    Immunization History   Administered Date(s) Administered    Covid-19 Vaccine Moderna 100 mcg/0.5 ml 10/30/2021    Covid-19 Vaccine Moderna 50 Mcg/0.25 Ml 2022    Covid-19 Vaccine Pfizer 30 mcg/0.3 ml 2021, 2021    Covid-19 Vaccine Pfizer  Bivalent 30mcg/0.3mL 10/13/2022    FLUZONE 6 months and older PFS 0.5 ml (17200) 10/30/2021    Flulaval, 3 Years & >, IM 2022    Influenza 2020, 10/30/2021, 2023    TDAP 2022, 2024         Physical Exam:    Vitals:    10/17/24 2200 10/18/24 0225 10/18/24 0547 10/18/24 0800   BP: 136/81 121/71 138/87 (!) 137/95   BP Location: Right arm Right arm Right arm Right arm   Pulse: 89 88 85 82   Resp: 16 16 16 16   Temp: 97.7 °F (36.5 °C) 98.2 °F (36.8 °C) 98.3 °F (36.8 °C)    TempSrc: Oral Oral Oral    SpO2:       Weight:            General:  Alert and oriented, No acute distress.    Breast:  No mass, No tenderness.         Areola/nipple: Bilateral areolas, Within normal limits.    Postpartum Exam:          Perineum: Not bruised, Not edematous.         Lochia: Small amount, Rubra, non malodorous.         Uterus: Firm ( Midline @ U/1 ), Not tender.    Musculoskeletal:  No tenderness.    Psychiatric:  Cooperative, Appropriate mood & affect.        PT Progress     Assessment & Plan:      (spontaneous vaginal delivery) ([facility])  PPD1      Major depressive disorder/ Anxiety disorder  Taking sertraline and Wellbutrin      Asthma ([facility])  Mild intermittent, Albuterol inhaler ordered for bedside        AMA (advanced maternal age) multigravida 35+, third trimester ([facility])      Encounter for elective induction of labor ([facility])  Delivered      Gestational hypertension, third trimester ([facility])  Elevated Bps > 4 hrs apart, PCR yesterday was zero  PreE labs this morning, CMP- WNL, PCR- 0, Plts were 189  Denies HA, dizziness, BV, spots or epigastric pain      Anemia ([facility])  PP Hgb 10.4, Rx Iron IP, t has her own iron she has been taking daily this pregnancy.  Ferrous sulfate upsets her GI system.    Anticipate discharge in AM   Discharge Rx's (X) Ibupropen, (  x ) Iron, ( x  ) Colace, ( x ) Has Breast pump   Post partum counseling and education reviewed  F/U with T. [place] CNM, in 2 wks Telehealth and then in the  office in 4-6 wks.        Completed education on the benefits of breastmilk/breast feeding and the risks of formula feeding. The mother has elected to:   feed breast milk only  both formula and breast milk   formula only    Ton Desir CNM

## 2024-10-19 VITALS
DIASTOLIC BLOOD PRESSURE: 88 MMHG | HEART RATE: 74 BPM | WEIGHT: 190 LBS | SYSTOLIC BLOOD PRESSURE: 129 MMHG | RESPIRATION RATE: 16 BRPM | BODY MASS INDEX: 28 KG/M2 | OXYGEN SATURATION: 98 % | TEMPERATURE: 98 F

## 2024-10-19 NOTE — DISCHARGE SUMMARY
[facility]  part of [facility]    Discharge Summary    Corinne Denise Macnichol Patient Status:  Inpatient    10/5/1990 MRN [id number]   Location [facility] 3SE Attending Amaya Mary CNM   Hosp Day # 2       Delivering OB Clinician: Laxmi Desir CNM    EDC: Estimated Date of Delivery: 10/21/24    Gestational Age: 39w3d    Antepartum complications:   Patient Active Problem List   Diagnosis    Leg pain, posterior, left    Chronic left-sided lumbar radiculopathy    Major depressive disorder    Asthma ([facility])    Moderate episode of recurrent major depressive disorder ([facility])    Anxiety disorder    Drug-induced sexual dysfunction ([facility])    Nausea/vomiting in pregnancy ([facility])    Low TSH level    At risk for postpartum depression    Anxiety during pregnancy ([facility])    Antepartum anemia ([facility])    AMA (advanced maternal age) multigravida 35+, third trimester ([facility])    Pelvic pain in pregnancy ([facility])    Encounter for elective induction of labor ([facility])    Gestational hypertension, third trimester ([facility])     (spontaneous vaginal delivery) ([facility])    Other specified anemias       Date of Delivery: 10/17/2024 Time of Delivery: 5:48 PM    Delivery Type: spontaneous vaginal delivery    Baby: Liveborn female   Information for the patient's :  Moshe, Girl [[id number]]   8 lb 6.4 oz (3.81 kg)  Apgars:  1 minute: 9  5 minutes: 910 minutes:       Intrapartum Complications: PIH    Admit Date: 10/17/2024    Discharge Date: 10/19/2024    Hospital Course: No complications Routine delivery and postpartum care    Discharged Condition: stable    Disposition: home    Plan:     Follow-up appointment in 2 weeks with TANIYA Alvarez CNM  10/19/2024  11:59 AM

## 2024-10-19 NOTE — PLAN OF CARE
Discharge Note  Discharge order received from MD. IV removed. Aware of follow up appointments. Discussed what to expect after discharge and when to call physician. went over discharge AVS with patient. Patient states understanding. Pt aware of pelvic rest for 6 weeks.     Electronically sent prescriptions: none  Printed Scripts: none    Aware can take motrin if needed at home      Witnessed mom sign release of custody form for infant.

## 2024-10-19 NOTE — DISCHARGE INSTRUCTIONS
Discharge Instructions for Vaginal Delivery  Follow-up  Schedule a  follow-up exam with the midwife who delivered you in 2 weeks and 6 weeks. Please remember to call as soon as possible for this appointment. After having a baby, your body may be very tired. It can take time to recover from a vaginal delivery. Please remember this and call if you have any questions or concerns before your 6 week appointment.   Medications    Please take Motrin at home for pain control 600mg every 6 hours as needed  Continue taking your Prenatal Vitamin daily, as long as you are nursing  Ferrous Sulfate 325 mg once every other day (may obtain in form of Gentle Iron, Slow FE, or liquid Floradix)  Vitamin D3 2000 IU daily  Colace (stool softener)  once or twice per day as needed  If you have stitches  You may have received stitches in the skin near your vagina. The stitches might have closed an episiotomy (an incision that enlarges the opening of the vagina). Or you may have needed stitches to repair torn skin. Either way, your stitches should dissolve within weeks. Until then, you can help reduce discomfort, aid healing, and reduce your risk of infection by keeping the stitches clean. These tips can help:  Gently wipe from front to back after you urinate or have a bowel movement.  After wiping, spray warm water on the area. Or you can have a sitz bath. This means sitting in a tub with a few inches of water in it. Then pat the area dry or use a hairdryer on a cool setting.  You can take a shower unless told not to.  Change sanitary pads at least every 2 to 4 hours.  Place cold packs as need, but remember to keep a thin towel between the pack and your skin.  Activity  Here are some suggestions:  Get lots of rest. Take naps as often as your can.   Increase your activities gradually.   Don’t have sexual intercourse until after you’ve had a follow-up appointment and you have decided on a birth control method.  Remember your are on  pelvic rest, so nothing in your vagina (tampons etc...), no tub baths, and no swimming pools.       When to call your healthcare provider  Call your health care provider right away if you have:  A fever of 100.4°F (38.0°C) or higher  Bleeding that requires a new sanitary pad after an hour, or large blood clots. Remember your bleeding will wax and wane. Please call if you are consistently saturating a pad and hour.   Pain in your vagina that gets worse and isn't relieved with medicine.  Burning, pain, red streaks, or lumpy areas in your breasts that may be accompanied by flu-like symptoms  Nausea or vomiting  Dizziness or fainting  Feelings of extreme sadness or anxiety, or a feeling that you don’t want to be with your baby  Abdominal pain that isn’t relieved with medicine  No bowel movement for 5 days  Redness, warmth, or pain in the lower leg  Chest pain          ibuprofen 600 mg every 6 hours as needed last dose : 1:16 pm

## 2024-10-19 NOTE — PROGRESS NOTES
[facility]  part of [facility]    OB/GYNE Progress Note      Corinne Denise Macnichol Patient Status:  Inpatient    10/5/1990 MRN [id number]   Location [facility] 3SE Attending Amaya Mayr CNM   Hosp Day # 2 PCP Aliyah Mckinney MD     Assessment & Plan:      (spontaneous vaginal delivery) ([facility])      Major depressive disorder      Asthma ([facility])      Moderate episode of recurrent major depressive disorder ([facility])      Anxiety disorder      AMA (advanced maternal age) multigravida 35+, third trimester ([facility])      Encounter for elective induction of labor ([facility])      Gestational hypertension, third trimester ([facility])      Other specified anemias  Stable PP recovery; breastfeeding      Discussed with:  nurse     patient and spouse     Plan discussed with patient who verbalizes understanding and agreement.    Subjective:   Breastfeeding, has BP monitoring at home. Voiding without difficulty    Review of Systems:  Gynecological: lochia  Gastrointestinal: denies  Genitourinary: denies  Constitutional: denies  Musculoskeletal: denies  Psychiatric: denies    Objective:   Vital signs in last 24 hours:  Temp:  [98.1 °F (36.7 °C)] 98.1 °F (36.7 °C)  Pulse:  [63-80] 80  Resp:  [16] 16  BP: (120-137)/(83-88) 137/85    Input/Output:  No intake or output data in the 24 hours ending 10/19/24 1156    Weight (Last 6):  Wt Readings from Last 6 Encounters:   10/17/24 190 lb (86.2 kg)   10/15/24 190 lb 9.6 oz (86.5 kg)   10/07/24 191 lb 3.2 oz (86.7 kg)   24 188 lb 6.4 oz (85.5 kg)   24 190 lb 12.8 oz (86.5 kg)   09/10/24 188 lb (85.3 kg)     Body mass index is 28.06 kg/m².         Exam:   Constitutional: comfortable, appears rested, and bonding well with infant  Genitourinary: Labia: normal  Uterus: fundus firm and fundus below umbilicus  Breast Exam: General appearance: normal  Psychiatric: calm  concrete thoughts   memory intact    Results:   Lab Results   Component Value Date    TREPONEMALAB  Nonreactive 10/17/2024    ABO O 10/17/2024    RH Positive 10/17/2024    WBC 11.7 (H) 10/18/2024    HGB 10.4 (L) 10/18/2024    HCT 31.4 (L) 10/18/2024    .0 10/18/2024    CREATSERUM 0.64 10/18/2024    BUN <5 (L) 10/18/2024     10/18/2024    K 4.0 10/18/2024     10/18/2024    CO2 21.0 10/18/2024     (H) 10/18/2024    CA 8.8 10/18/2024    ALB 3.5 10/18/2024    ALKPHO 187 (H) 10/18/2024    BILT 0.3 10/18/2024    TP 5.6 (L) 10/18/2024    AST 26 10/18/2024    ALT 13 10/18/2024    T4F 1.3 04/08/2024    TSH 1.043 08/27/2024     03/20/2022    GGT 17 04/06/2022       Lab Results   Component Value Date    COLORUR Light-Yellow 07/30/2024    CLARITY Turbid (A) 07/30/2024    SPECGRAVITY 1.007 07/30/2024    PROUR Negative 07/30/2024    GLUUR Normal 07/30/2024    KETUR Negative 07/30/2024    BILUR Negative 07/30/2024    BLOODURINE Negative 07/30/2024    NITRITE Negative 07/30/2024    UROBILINOGEN Normal 07/30/2024    LEUUR 75 (A) 07/30/2024    UASA Negative 03/20/2022       No results found.         Iram Rojas CNM  10/19/2024  11:56 AM

## 2024-10-22 ENCOUNTER — POSTPARTUM (OUTPATIENT)
Dept: OBGYN CLINIC | Facility: CLINIC | Age: 34
End: 2024-10-22

## 2024-10-22 ENCOUNTER — LAB ENCOUNTER (OUTPATIENT)
Dept: LAB | Age: 34
End: 2024-10-22
Attending: ADVANCED PRACTICE MIDWIFE
Payer: COMMERCIAL

## 2024-10-22 ENCOUNTER — TELEPHONE (OUTPATIENT)
Dept: OBGYN CLINIC | Facility: CLINIC | Age: 34
End: 2024-10-22

## 2024-10-22 VITALS
HEIGHT: 69 IN | BODY MASS INDEX: 25.92 KG/M2 | WEIGHT: 175 LBS | DIASTOLIC BLOOD PRESSURE: 84 MMHG | SYSTOLIC BLOOD PRESSURE: 127 MMHG | HEART RATE: 80 BPM

## 2024-10-22 DIAGNOSIS — R03.0 ELEVATED BLOOD PRESSURE READING: ICD-10-CM

## 2024-10-22 DIAGNOSIS — Z87.59 HISTORY OF POSTPARTUM DEPRESSION: ICD-10-CM

## 2024-10-22 DIAGNOSIS — R03.0 ELEVATED BLOOD PRESSURE READING: Primary | ICD-10-CM

## 2024-10-22 DIAGNOSIS — Z86.59 HISTORY OF POSTPARTUM DEPRESSION: ICD-10-CM

## 2024-10-22 DIAGNOSIS — O09.523 AMA (ADVANCED MATERNAL AGE) MULTIGRAVIDA 35+, THIRD TRIMESTER (HCC): ICD-10-CM

## 2024-10-22 DIAGNOSIS — R45.89 FEELING OF SADNESS: ICD-10-CM

## 2024-10-22 DIAGNOSIS — Z87.59 HISTORY OF GESTATIONAL HYPERTENSION: ICD-10-CM

## 2024-10-22 LAB
ALBUMIN SERPL-MCNC: 4.2 G/DL (ref 3.2–4.8)
ALBUMIN/GLOB SERPL: 1.6 {RATIO} (ref 1–2)
ALP LIVER SERPL-CCNC: 167 U/L
ALT SERPL-CCNC: 57 U/L
ANION GAP SERPL CALC-SCNC: 7 MMOL/L (ref 0–18)
AST SERPL-CCNC: 38 U/L (ref ?–34)
BILIRUB SERPL-MCNC: 0.3 MG/DL (ref 0.3–1.2)
BUN BLD-MCNC: 8 MG/DL (ref 9–23)
BUN/CREAT SERPL: 11.6 (ref 10–20)
CALCIUM BLD-MCNC: 9.2 MG/DL (ref 8.7–10.4)
CHLORIDE SERPL-SCNC: 111 MMOL/L (ref 98–112)
CO2 SERPL-SCNC: 25 MMOL/L (ref 21–32)
CREAT BLD-MCNC: 0.69 MG/DL
DEPRECATED RDW RBC AUTO: 47.9 FL (ref 35.1–46.3)
EGFRCR SERPLBLD CKD-EPI 2021: 116 ML/MIN/1.73M2 (ref 60–?)
ERYTHROCYTE [DISTWIDTH] IN BLOOD BY AUTOMATED COUNT: 15.6 % (ref 11–15)
FASTING STATUS PATIENT QL REPORTED: NO
GLOBULIN PLAS-MCNC: 2.6 G/DL (ref 2–3.5)
GLUCOSE BLD-MCNC: 80 MG/DL (ref 70–99)
HCT VFR BLD AUTO: 35.1 %
HGB BLD-MCNC: 11.7 G/DL
MCH RBC QN AUTO: 28.1 PG (ref 26–34)
MCHC RBC AUTO-ENTMCNC: 33.3 G/DL (ref 31–37)
MCV RBC AUTO: 84.4 FL
OSMOLALITY SERPL CALC.SUM OF ELEC: 293 MOSM/KG (ref 275–295)
PLATELET # BLD AUTO: 323 10(3)UL (ref 150–450)
POTASSIUM SERPL-SCNC: 4 MMOL/L (ref 3.5–5.1)
PROT SERPL-MCNC: 6.8 G/DL (ref 5.7–8.2)
RBC # BLD AUTO: 4.16 X10(6)UL
SODIUM SERPL-SCNC: 143 MMOL/L (ref 136–145)
WBC # BLD AUTO: 7.2 X10(3) UL (ref 4–11)

## 2024-10-22 PROCEDURE — 36415 COLL VENOUS BLD VENIPUNCTURE: CPT

## 2024-10-22 PROCEDURE — 80053 COMPREHEN METABOLIC PANEL: CPT

## 2024-10-22 PROCEDURE — 85027 COMPLETE CBC AUTOMATED: CPT

## 2024-10-22 NOTE — TELEPHONE ENCOUNTER
Phoned pt to check in because I received report that she had 2 elevated  BP's. Third BP was high normal. She took her pressure because she was feeling dizzy. She reports that dizziness has resolved. No vision changes but she does report a headache. Has not taken any pain meds since early this AM. Advised patient to take 2 extra strength Tylenol right now and page me back in 1 hour. If headache does not resolve, she will need to go to ED for further evaluation. She voiced understanding and agreed with plan. All questions answered.

## 2024-10-22 NOTE — TELEPHONE ENCOUNTER
Ana María Cannon, TANIYA  P Em Ob/Gyne Midwifery Clinical Pool  Please call patient and have her stop in lab prior to BP check today. Thanks!    Pt wanted to be seen at the [place] office only today. Pt scheduled for her BP check today at 1:30 pm with Amaya Mary at the [place] office. Informed pt to complete her labs before coming to this visit. Pt voices understanding.

## 2024-10-24 PROBLEM — F41.8 POSTPARTUM ANXIETY (HCC): Status: ACTIVE | Noted: 2024-10-24

## 2024-10-24 PROBLEM — F33.42 RECURRENT MAJOR DEPRESSION IN FULL REMISSION (HCC): Status: ACTIVE | Noted: 2024-10-24

## 2024-10-28 NOTE — PROGRESS NOTES
Subjective:   Patient ID: Corinne Denise Macnichol is a 34 year old female who had a  on 10/18 Pregnancy was complicated by GHTN.  Pt reports that last evening she had elevated blood pressures also had a lack of sleep  Family stress is present and she is having difficulty breast feeding  Pt is tearful.    HPI    History/Other:   Review of Systems   Constitutional: Negative.    Respiratory: Negative.     Cardiovascular: Negative.    Genitourinary: Negative.    Psychiatric/Behavioral:  Negative for self-injury and suicidal ideas. The patient is nervous/anxious.      Current Outpatient Medications   Medication Sig Dispense Refill    buPROPion ER (WELLBUTRIN XL) 150 MG Oral Tablet 24 Hr Take 1 tablet (150 mg total) by mouth nightly. 90 tablet 1    sertraline (ZOLOFT) 50 MG Oral Tab Take 1 tablet (50 mg total) by mouth every morning. 90 tablet 1    famotidine 20 MG Oral Tab Take 1 tablet (20 mg total) by mouth as needed for Heartburn.       Allergies:Allergies[1]    Objective:   Physical Exam  Vitals reviewed.   Constitutional:       General: She is not in acute distress.     Appearance: Normal appearance. She is not ill-appearing, toxic-appearing or diaphoretic.   Cardiovascular:      Rate and Rhythm: Normal rate.   Pulmonary:      Effort: Pulmonary effort is normal.   Neurological:      Mental Status: She is alert and oriented to person, place, and time.         Assessment & Plan:   1. Difficulty of mother performing breastfeeding (HCC)    2. Feeling of sadness    3. History of postpartum depression    4. History of gestational hypertension    Referral to lactation  Referral to  will be seen by therapist in OP office  Initial assessment dne pt scheduled an appt  Continue with medications as prescribed  Continue with BP daily notify if >140/90 or id headaches visual  changes or epigastric pain  All questions answered  Warning signs reviewed    No orders of the defined types were placed in this encounter.      Meds  This Visit:  Requested Prescriptions      No prescriptions requested or ordered in this encounter       Imaging & Referrals:  [facility] REFERRAL LACTATION CONSULT         [1] No Known Allergies

## 2024-10-30 ENCOUNTER — NURSE ONLY (OUTPATIENT)
Dept: LACTATION | Facility: HOSPITAL | Age: 34
End: 2024-10-30
Attending: ADVANCED PRACTICE MIDWIFE
Payer: COMMERCIAL

## 2024-10-30 ENCOUNTER — LACTATION ENCOUNTER (OUTPATIENT)
Dept: LACTATION | Facility: HOSPITAL | Age: 34
End: 2024-10-30

## 2024-10-30 PROCEDURE — 99213 OFFICE O/P EST LOW 20 MIN: CPT

## 2024-10-30 NOTE — LACTATION NOTE
This note was copied from a baby's chart.     10/30/24 1600   Evaluation Type   Evaluation Type Outpatient Initial   Problems & Assessment   Problems Diagnosed or Identified Failure to gain weight adequately   Problems: comment/detail Corinne presents with 13 day old Shaye for breastfeeding evaluation recommended by pediatrician due to failure to gain weight. Corinne reports Shaye is exclusively breastfeeding, hx of nipple shield use due to nipple pain/trauma early PP, weaned from nipple shield 2 days ago without c/o nipple pain with feedings. States Shaye is sleepy at breast, mostly feeds from one breast due to fatigue/disinterest, fussiness/cluster feeding patterns in early evening and endless feeding for 3-4 hours. Shaye is waking for her feedings every 2 hours at night, parent led during the day every  2.5-3 hours. Today's weight is 7 lb 13 oz, same as Pediatrician visit yesterday (different scale, naked weight today). Assisted with weighted feeding.   Infant Assessment Hunger cues present   Muscle tone Appropriate for GA   Feeding Assessment   Summary Current Feeding Breastfeeding exclusively   Breastfeeding Assessment Assisted with breastfeeding w/mother's permission;Coordinated suck/swallow;Deep latch achieved and observed;Tolerated feeding well;Sustained nutrititive latch w/audible swallows;Sleepy infant, quickly pacifies;Calm and ready to breastfeed;PO supplement followed breastfeeding   Breastfeeding lasted # of minutes 20   Breastfeeding Positions cross cradle;cradle;laid back;right breast;left breast   Latch 2   Audible Sucks/Swallows 2   Type of Nipple 2   Comfort (Breast/Nipple) 2   Hold (Positioning) 1   LATCH Score 9   Other (comment) Observed feeding session, assisted with deep latch and demonstrated signs of active suck/swallow in response to breast compressions and gentle stimulation. Shaye fatigues quickly, offered \"3\" breasts at this session, arousable to feed. Two large spit up witnessed  between side one and two as well as end of feeding, approximately 10 ml total. Reviewed reflux precautions. Shallow latch noted on left side, hx of more nipple pain/trauma on left side.   Output   # Voids in 24 hours WNL   # Stools in 24 hours frequent small amounts, stringy   # Emesis in 24 hours occasional, 2 large emesis post feeding observed today   Pre/Post Weights   Pre-Weight Right Breast (g) 3544   Post-Weight Right Breast (g) 3588   ml of milk, RT Brst 44   Pre-Weight Left Breast (g) 3588   Post-Weight Left Breast (g) 3614   ml of milk, LT Brst 26   ml of milk, total 70   Supplement Type EBM   Supplement Type (other) Mom pumped 20 ml, has lansinoh wide neck bottles and older Dr. Cannon bottle (used with son). demonstrated paced bottle feeding using slow flow standard bottle, well tolerated   Supplement total, ml 20  (minus ~10 in spit up)   Feeding total ml 90   Equipment used   Equipment used Bottle with slow flow nipple

## 2024-10-30 NOTE — LACTATION NOTE
10/30/24 1600   Evaluation Type   Evaluation Type Outpatient Initial   Problems identified   Problems identified Knowledge deficit;Milk supply WNL   Problems Identified Other Corinne presents with 13 day old Shaye for breastfeeding evaluation recommended by pediatrician due to failure to gain weight. Corinne reports Shaye is exclusively breastfeeding, hx of nipple shield use due to nipple pain/trauma early PP, weaned from nipple shield 2 days ago without c/o nipple pain with feedings. States Shaye is sleepy at breast, mostly feeds from one breast due to fatigue/disinterest, fussiness/cluster feeding patterns in early evening and endless feeding for 3-4 hours. Shaye is waking for her feedings every 2 hours at night, parent led during the day every 2.5-3 hours. Today's weight is 7 lb 13 oz, same as Pediatrician visit yesterday (different scale, naked weight today). Assisted with weighted feeding.   Maternal history   Maternal history AMA;Induction of labor;Depression   Other/comment hx of nipple pain trauma now healed   Breastfeeding goal   Breastfeeding goal To maintain breast milk feeding per patient goal   Maternal Assessment   Bilateral Breasts Soft;Symmetrical   Bilateral Nipples Everted;Elastic   Prior breastfeeding experience (comment below) Multip;Successful   Prior BF experience: comment Hx BF 18 months   Breastfeeding Assistance Breastfeeding assistance provided with permission;Pumping assistance provided with permission   Pain assessment   Pain scale comment denies   Treatment of Sore Nipples Deeper latch techniques;Expressed breast milk   Guidelines for use of:   Breast pump type [place];Other  (Motif)   Current use of pump: Initiated at this time   Suggested use of pump Pump each time a supplement is offered;Pump if infant is not latching to breast   Post-feed pumped volume 20 ml following infant transfer of ~70  ml   Other (comment) see pt instructions

## 2024-10-30 NOTE — PATIENT INSTRUCTIONS
[facility]  Almaz Jacobs RN, BSN, IBCLC  [phone]      Birth Weight: 8 lb 6.4 oz  Today's Naked Weight: 7 lb 13   Weight increase: 0 oz in 1 day (compared with pediatric scale)       Shaye transferred 70 ml from breast today (44 right, 26 left, spat up approximately 10 ml). A summary of topics we discussed today is below the feeding plan developed today.     FEEDING PLAN    Breastfeeding frequency: Offer both breasts each feeding without nipple shield, as done over the past 2 days. Observe for signs of a deep latch and adequate feeding, described below.  Make the best of 20-30 minutes (+/-) when feeding at breast, apply breast compressions and provide gentle stimulation as needed to encourage efficiency at breast.    Supplementation: To increase weight, offer 2 bottle per day, 1-2 oz of expressed breast milk, formula if breast milk is unavailable, until weight stabilizes and Shaye is able to consistently actively suck/swallow for 15-30 minutes between both breasts. See paced bottle feeding below if supplementation by bottle is indicated.    Pumping: To protect milk supply and provide breast milk for supplementation recommendation, pump each time a bottle is given. Adjust pump settings: increase vacuum/suction to maximum level that is comfortably tolerated ~ adjust stimulation mode/expression mode according to milk release.     Follow up: With Pediatrician as scheduled for weight check, sooner if concerns related to feeding or feeding retention occur. With lactation if concerns related to feeding, milk supply, weight persist/worsen, or for reassurance as needed. Refer to additional support groups/resources below.          ADDITIONAL INFORMATION:     Snuggle your baby in skin to skin contact between and during feedings whenever possible.    Massage your breasts before nursing or pumping to soften areola if needed.    Breastfeed with hunger cues: Most babies will breastfeed 8-12 times  every 24 hours with some clustered breastfeeding, especially during growth spurts.     Positioning:   Baby facing mom with mouth at nipple level. Bring infant to the breast not the breast to the infant.  Make sure infant is fully turned towards you with head aligned with the shoulders for latch and arms hugging you.  Baby should approach breast reaching up with the chin lifted.  Chin is deep into the breast and nose is slightly away from breast after latch.  Make small adjustments in position for your comfort and to help make space for the infant's nose if needed.    Deep Latching on:  Express drops of milk onto your baby’s lips to encourage latching if needed.  Aim your nipple to baby’s nose  Wait for a wide mouth and push your nipple in the mouth as you bring infant fully onto the breast.  Observe for mouth \"planted\" on the breast and for good jaw movement with suck.    Is baby taking enough breast milk?  Swallowing with most sucks (every 1-3 sucks) until satisfied at least 8-12 times every 24 hours.  Compressing the breast when your baby sucks can increase milk flow.  At least 6-8 wet diapers and at least 3-4 soft, yellow seedy stools every 24 hours. Use the breastfeeding journal to keep a record.   Weight gain of at least 5-7 ounces per week for the first 3 months after return to birth weight.    Supplement when:    Breastfeeding session does not meet adequate feeding guidelines above.  Your baby's doctor has advised that supplementation is needed.  Use your expressed breast milk as the supplement or formula if breast milk does not meet volume infant requires.    Hour of Age  Intake (mL/feed)  1st 24 hours 2-10  24-48 hours 5-15  48-72 hours 15-30  72-96 hours 30-60  Day 10: 2-3 ounces per feeding.  4 weeks: 3-4 ounces or more per feeding.    Paced bottle feeding using a slow flow nipple:     Hold your baby in an upright position, supporting the hand and neck with your hand, rather than in the crook of your arm.    Let your baby “latch on” to the bottle: stroke nipple down from top lip to bottom, licking is good, wait for wide mouth and insert nipple with lips on base.  Angle the bottle so flow is slower. If the bottle is vertical milk will flow to quickly.  Pausing mimics breastfeeding and discourages “guzzling” the feeding.      Do I need to pump my breasts?  If supplementing:  Pump both breasts each time a supplement is given until infant nursing well.  Pump for 10-15 minutes using double electric breast pump.  Save all expressed breast milk for your infant.    Remember the helpful website to improve your production that helps [email or web address]-education/[email or web address]/[email or web address]-milk-production.html    Breastfeeding Journal:  Write down your baby’s feedings and diapers - if not meeting the guideline for number of diapers or feedings, call your baby’s doctor.      Follow up with your OB healthcare provider:  Call if a plugged duct or engorgement persists greater than 48 hours. Call if firm or reddened spots are present in breast with signs of fever, chills or flu like symptoms (possible breast infection/mastitis). Check your temperature during engorgement.      Care for nipples until healed:     Express drops of breast milk on nipples before and after nursing (unless nipple thrush is suspected or present).  Use a hydrogel type dressing on your nipples between feedings. (Soothies or Ameda Comfort Gel pads)  Or, use Lanolin every time after breastfeeding. (Do not combine with use of gel pads)  If too sore to nurse on one or both breasts, pump one (or both) breast(s) to comfort every 2-3 hours. If nursing to contentment on one breast, this pumped milk can be stored for future use. If not nursing on either breast, feed baby your breast milk until able to return to breast.   Discuss use of all purpose nipple ointment with your OB doctor.   Call doctor if nipple has signs of infection: red/deep pink,  drainage (pus), increased pain, fever.         Call your OB doctor with any signs of   mastitis (breast infection)    Plugged area(s) are not soft within 24 hours.   Breast becomes firm, reddened, or painful.   Fever, chills, or flu-like symptoms. Check your temperature 3 times daily until a plugged duct or mastitis resolves.    If mastitis occurs:  Continue breastfeeding and/or pumping - your milk is not infected.   Continue above treatment for relieving plugged area(s)  Continue to take antibiotic as prescribed even though you may quickly feel better.   Contact your doctor is you are not feeling significantly better within 1-2 days of starting antibiotic, sooner if symptoms worsen.      Call the lactation consultants at [phone] as needed.         Increasing Milk Production Using a Breast Pump       Kangaroo mother care: Snuggle with your baby in skin to skin contact.  This helps to wake a sleepy baby and increases your milk supply.     Massage your breasts before nursing or pumping.  Practice relaxation techniques like visual imagery.    Increase the frequency of feedings and/or pumping sessions.    Most babies will feed 8-12 times in 24 hours with some periods of cluster feeding, therefore try to increase pumpings to 8-10 times every 24 hours.    Keep pumping log with 24 hour collection totals to monitor milk supply.  Once your milk is in (3-5 days post-delivery), pump until the sprays of milk slow to drops and for 1-2 minutes after to obtain the high fat milk.  This for most moms is 10-12 minutes, but pumping times may vary slightly.  A short pumping is better than no pumping!  If you have time you can “cluster pump” like a baby cluster feeds at times - pump for ten minutes, rest for ten minutes, then repeat 2-3 times. Or try pumping every hour for 10 minutes for 2-3 hours.     Pumping should not be painful.   Use nipple cream on the base of your nipples prior to pumping.  Place breast flange on your  breasts, centering your nipples.    Use correct size breasts flange for your nipple size. Nipple should not rub on inside of breast flange. If you need a different size breast flange contact your nurse or the lactation department.   Set pressure gauge to minimum and turn switch on.  Increase the suction to the most suction that is comfortable for you. Remember pumping should never be painful.  If breast milk flow is minimal, try increasing pressure gauge if it is comfortable to do so.  NOTE: Initially, in the colostrum phase amounts vary from a few drops to 30cc (1oz.).  If pumping is painful, turn the pump off, reposition breast shields, check that the size is correct for your nipple, and adjust the suction. If nipple pain continues contact the lactation department or your doctor.    Ways to help your milk let down (flow) to the pump:   Massage your breasts for a few minutes prior to pumping and massage again if the milk flow slows down during the pumping session.   Hand expression of milk before and after pumping may allow you to obtain more milk than the pump alone.   When milk flow slows, increasing pump speed back to 80 cpm ([facility]meda [place]) or switching pump back to “stimulation” phase to stimulate further milk ejection reflexes. Then decrease speed once milk begins to flow again.   Pump after you’ve seen, held, or touched your baby. Provide skin-to-skin when able.  Pump with baby close by or have a picture of your baby to look at while you pump  Inhale the scent of your baby from something your baby wore.  Sit back, close your eyes and imagine how sweet and soft your baby is; imagine “flowing things” like waterfalls, white rivers.  Listen to relaxing music. There are relaxation/meditation CD/tapes made especially for mothers pumping their breast milk.  Have a nice tall glass of water, juice, or milk close by to quench your thirst.  View the [employer] website videos: Maximizing Milk Production and  Hand Expression   [email or web address] (Google search: [place] maximizing milk supply)      Include night feedings and/or pumping sessions. Your hormone levels are higher at night.    Increasing milk flow to baby if breastfeeding by Improving your baby’s position and latch. (refer to additional instructions)            Additional recommendations can be made on an individual basis depending on needs.     [facility] has great support for our families even after discharge.  We have virtual or in-person support groups.  Visit our website for the most up-to-date info for our many different support groups. [email or web address]       Outpatient Lactation appoints.  Call [phone]- to schedule an appt.  Our office is located in the Maternal Fetal Medicine office next to [facility] on the first floor.      New Moms Support Groups  Our weekly New Mom Support Groups are for any new parents in our community. They are led by an experienced Mother/Baby nurse or IBCLC and usually include a guest speaker on a topic of interest to new parents. These in-person groups also include Breastfeeding Support at each meeting. Bring your baby ( - 6 months) with you! Moms-to-be are also welcome! All mom's welcome even if its not your first.     MOM & BABY HOUR   Meets most  10:00 - 11:30 a.m.  Masks are not required, but be considerate of others and do not attend if mom or baby have had any symptoms of illness within the previous 24 hours. Breastfeeding support will be included at each session--just ask the leader any breastfeeding questions you may have. Location [facility] [place] [address] [place] Go inside the front door and to the right to the “Community Education Room”.    Mom's Line: [phone]   This service is provided by [facility], has a phone line  dedicated women (or anyone worried about a women) who may be experiencing signs or symptoms of postpartum depression.    Nurturing Mom- A support group for new and expectant moms looking for support with the transition to parenthood as well as those experiencing symptoms of  anxiety and/or depression.  Please contact [email or web address] if you need directions or the link for the virtual meetings. Please contact [email or web address] if you plan to attend, but please be considerate of others and do not attend if mom or baby have had any symptoms of illness within the previous 24 hours.     La [employer]ec[employer] League for breast feeding and parent support, Website: IIIus.org  and for the [place] group and other groups visit https://www.facebook.com/pg/[place]/events/.  to help find a group, all meetings are virtual.     Facebook groups-  for more support when home- [facility]ies & Mommies of [facility] --- you can find mom-to-mom advice and the list of speaker topics for cradle talk program.     Helpful websites:    www.llli.org  www.[email or web address]  [email or web address]

## 2024-11-13 ENCOUNTER — TELEPHONE (OUTPATIENT)
Dept: OBGYN UNIT | Facility: HOSPITAL | Age: 34
End: 2024-11-13

## 2024-11-13 PROBLEM — R10.2 PELVIC PAIN IN PREGNANCY (HCC): Status: RESOLVED | Noted: 2024-07-30 | Resolved: 2024-11-13

## 2024-11-13 PROBLEM — O26.899 PELVIC PAIN IN PREGNANCY (HCC): Status: RESOLVED | Noted: 2024-07-30 | Resolved: 2024-11-13

## 2024-12-04 ENCOUNTER — POSTPARTUM (OUTPATIENT)
Dept: OBGYN CLINIC | Facility: CLINIC | Age: 34
End: 2024-12-04

## 2024-12-04 ENCOUNTER — LAB ENCOUNTER (OUTPATIENT)
Dept: LAB | Age: 34
End: 2024-12-04
Attending: ADVANCED PRACTICE MIDWIFE
Payer: COMMERCIAL

## 2024-12-04 VITALS
WEIGHT: 173 LBS | DIASTOLIC BLOOD PRESSURE: 82 MMHG | BODY MASS INDEX: 25.62 KG/M2 | SYSTOLIC BLOOD PRESSURE: 127 MMHG | HEIGHT: 69 IN | HEART RATE: 73 BPM

## 2024-12-04 LAB
DEPRECATED RDW RBC AUTO: 38.2 FL (ref 35.1–46.3)
ERYTHROCYTE [DISTWIDTH] IN BLOOD BY AUTOMATED COUNT: 12.7 % (ref 11–15)
HCT VFR BLD AUTO: 39.4 %
HGB BLD-MCNC: 12.7 G/DL
MCH RBC QN AUTO: 26.9 PG (ref 26–34)
MCHC RBC AUTO-ENTMCNC: 32.2 G/DL (ref 31–37)
MCV RBC AUTO: 83.5 FL
PLATELET # BLD AUTO: 304 10(3)UL (ref 150–450)
RBC # BLD AUTO: 4.72 X10(6)UL
WBC # BLD AUTO: 7.5 X10(3) UL (ref 4–11)

## 2024-12-04 PROCEDURE — 36415 COLL VENOUS BLD VENIPUNCTURE: CPT

## 2024-12-04 PROCEDURE — 85027 COMPLETE CBC AUTOMATED: CPT

## 2024-12-04 NOTE — PATIENT INSTRUCTIONS
After Giving Birth: How to Feel Healthy    Helping yourself feel fit is one of the best things you can do for your baby. A little exercise will tone your muscles. You’ll feel stronger and more energized. You’ll also feel more awake and aware. Don’t worry about your weight right now. Your goal is to feel healthy. Part of feeling good is dressing for comfort. If you dress “smart,” you can be a busy new mom and still look great.  Continue Kegel exercises  You may have been told to do Kegel exercises during pregnancy. These exercises strengthen the muscles that are strained by carrying and delivering the baby. You can return to your Kegels as soon as you feel ready. Why not start today? Squeeze your pelvic floor muscles (the ones that control your urine stream) for at least 5 seconds. Relax, then squeeze again. Work your way up to 50 or 100 Kegels a day.  Exercise often  Exercise helps you get in shape. It also strengthens your muscles, so you are better fit for lifting the baby. As an added benefit, exercise gives you a sense that you’re doing something good for yourself. Take your baby for a short walk, or spend 10 minutes stretching. If you were active during pregnancy, you can probably begin light exercise as soon as you feel ready. But be sure to check with your healthcare provider before you begin.  Stay off the scale  For the first month, think about regaining energy and feeling good, not about losing weight. Losing weight too soon can make you feel more tired. Instead, focus on caring for your baby and eating balanced meals. You may lose some weight without even trying, especially if you’re breastfeeding. Once your energy level is back to normal, you can begin to lose weight. A gradual weight loss of 4 or 5 pounds (1.8 or 2.27 kg) a month is safest.  Dress smart  You’ll want to be comfortable during the first days after delivery. Wear a robe, pajamas, or sweats -- whatever feels best. Soon you may want to look  more like your prepregnant self. Do your hair and wear makeup, if you normally do. A loose-fitting dress may feel good. But do yourself a favor: Don’t reach for your jeans. It’s likely to be a month or more before you can wear them. If leaking breasts are a problem, put pads inside your bra and dress in layers. If you’re breastfeeding, shirts that open in front or pullover tops are good choices. A scarf or shawl can be used as a drape if you breastfeed when others are present.  When to call your healthcare provider  Remember to schedule your postpartum visit. If you delivered by , be seen within 2 weeks. For vaginal delivery, be seen 4 to 6 weeks after the birth. Also, call your healthcare provider if you have:  Heavy bleeding  Fever  Redness or persistent lump in breasts  Inability to void or have a bowel movement after 1 week  Severe pain  Worsening depression  [employer] last reviewed this educational content on 2018 [employer]. [address] [place] [address]. All rights reserved. This information is not intended as a substitute for professional medical care. Always follow your healthcare professional's instructions.        Nutrition While Breastfeeding  Do I need a special diet for breastfeeding?    You don't have to eat a special diet to make enough milk for your baby. Also, your milk will be of good quality for your baby regardless of what you eat. But your body needs fuel to make breastmilk. So eat your fill of a variety of foods. Breastfeeding isn’t an excuse to eat and drink everything you want. But it’s not a reason to avoid favorite foods either.   Healthy diet for the new mother  A healthy diet is recommended for all women and offers many benefits to the new mother. Choosing a variety of healthy foods creates a pattern for the entire family. Each family member benefits. Women who are breastfeeding need about 500 extra calories per day. Some women might  need more, while others might need less. When choosing foods, use the nutrition chart below as a guide.  Bread, cereal, rice, and pasta Vegetables Fruit   Milk, yogurt, and cheese Meat, poultry, fish,  dry beans, eggs, and nuts Fats, oils, and sweets  (use sparingly)   What’s good for you?  Here are some things to do:  Breastfeeding women need to drink when they feel thirsty. There is no specific amount of water you need to drink to make enough milk.  Follow healthy eating guidelines.  Snack on fruit or low-fat dairy products if you’re hungry between meals.  If your healthcare provider recommends it, keep taking prenatal vitamins.  What’s not good for you?  Here are other things to consider:  Limit fatty foods and foods that are high in sugar (cookies, cakes).  Be aware that what enters your body may pass into your breastmilk. Limit caffeine. It is not just in coffee. It is also in cola, tea, and chocolate.  Limit the amount of fish that may contain mercury, such as shark and swordfish.  Talk with your healthcare provider before taking any medicines. It is important to let your healthcare provider know that you are nursing. Some medicines are not safe with breastfeeding.  Remember: Alcohol, cigarettes, and drugs also affect your breastmilk and your baby. Talk with your healthcare provider.  [employer] last reviewed this educational content on 1/1/2018  © 8462-1820 [employer], [employer]. [address] [place] [address], [place] [address]. All rights reserved. This information is not intended as a substitute for professional medical care. Always follow your healthcare professional's instructions.        Breastfeeding: Caring for Yourself  When you have a new little person in your life, it’s easy to forget about yourself. There are new demands on your time. There are also new responsibilities. But it’s important to take care of yourself. This will help you take better care of your new baby.    Healthy habits  Here are some  healthy tips:  Get exercise when you can. If you leak milk, it will help to nurse right before the activity.  Avoid smoking. Smoking is unhealthy for you and may cause you to make less milk. Secondhand smoke is also harmful to your baby.  Talk to your healthcare provider about alcohol, if you choose to drink.  When you’re sick, tell your healthcare provider that you are breastfeeding. Few medicines and illnesses affect breastfeeding, but it is important to check.  Ask your healthcare provider before taking any prescription or over-the-counter medicines, herbs, or supplements.  Comfy clothes  Suggestions for being comfortable when breastfeeding include:  Find a comfortable nursing bra. Many women find underwire uncomfortable. Some stores offer on-site fittings. Ask your healthcare provider or nurse for a referral.  If you have leaking milk, place breast pads inside your bra.  Choose an extra-supportive bra for exercise. Or you can wear two bras at the same time for more support.  Wear loose tops that can be lifted for breastfeeding. You can also buy clothes specially made for breastfeeding moms.  A note about sex  After delivery, it may take a while before your interest in sex returns. Share your feelings with your partner. Your healthcare provider will let you know when it is safe to resume having sex. When you’re ready, know that:  There are several forms of birth control that can be used while breastfeeding. Ask your healthcare provider what to use for pregnancy prevention while you are nursing.  Breastfeeding hormones may cause vaginal dryness. Some women find using a water-based lubricant makes sex more comfortable.  Milk may leak out when you are aroused. Applying pressure on the nipple, using breast pads, or a towel may help with this.  When to call your healthcare provider  Call your healthcare provider if:  You feel overwhelmed and don't know where to turn.  You feel very sad or don’t want to be with your  baby.  You feel like your baby cries all the time and won't be soothed.  You are unable to exercise, or have sex, without discomfort.  You are unsure about a medicine, illness, or activity and its effect on breastfeeding.  [employer] last reviewed this educational content on 2018 The [employer]. [address] [place] [address], [place] [address]. All rights reserved. This information is not intended as a substitute for professional medical care. Always follow your healthcare professional's instructions.        For New Mothers: Staying Fit After Delivery    After you deliver your baby, you can start to exercise when you feel ready. Let your body be your guide. Most women are ready to exercise after 6 weeks, where some women will be ready a few days after giving birth. If you’ve had a  section, you will need more time. Ask your healthcare provider when it is safe to start exercising again.  Exercise tips for new mothers  You can start doing Kegel exercises as soon as you deliver your baby. Do them at least 10 times a day to help avoid bladder problems later on. Kegel exercises help strengthen your pelvic muscles. To do them, squeeze the muscles that you use to stop passing urine (do not do this while urinating). Hold that squeeze for a count of 10, then release.   You will want to resume other exercise gradually and talk to your healthcare provider before starting. Always exercise with care. When you first start exercising after giving birth, try simple exercises that help strengthen major muscle groups, including abdominal and back muscles. Slowly add moderate-intensity exercise. Try to work up to at least 150 minutes of moderate-intensity aerobic activity every week. Moderate intensity means you are moving enough to raise your heart rate and start sweating. You can still talk normally. But you cannot sing. Muscle-strengthening exercises should be done along with your aerobic activity on at  least 2 days a week. Look for ways to combine exercising with being with your new baby. Try putting your baby into a front pack or in the stroller and take a walk.  Strengthening stomach muscles  Many new mothers want to strengthen their stomach muscles after giving birth. Try this exercise when you’re ready to resume your program. It will strengthen the front and side muscles of your stomach:  Lie on your back with your knees bent and feet flat on the floor. Cross your arms over your stomach. Use your fingers to gently pull the sides of the stomach toward the middle of your body.  Exhale and try to pull the stomach muscles toward your spine. Gently raise your shoulders off the floor, no more than 6 to 8 inches. Hold for 5 seconds. Repeat 5 times.  [employer] last reviewed this educational content on 2/1/2018 © 2000-2020 [employer]. [address] [place] [address]. All rights reserved. This information is not intended as a substitute for professional medical care. Always follow your healthcare professional's instructions.        Understanding Postpartum Depression  You’ve just had a baby. You expected to be excited and happy. But instead you find yourself crying for no reason. You may have trouble coping with your daily tasks. You feel sad, tired, and hopeless most of the time. You may even feel ashamed or guilty. But what you’re going through is not your fault and you can feel better. Talk with your healthcare provider. He or she can help.     What is depression?  Depression is a mood disorder that affects the way you think and feel. The most common symptom is a feeling of deep sadness. You may also feel as if you just can’t cope with life. Other symptoms include:   Gaining or losing a lot of weight  Sleeping too much or too little  Feeling tired all the time  Feeling restless  Crying a lot  Having too little or too much appetite.  Withdrawing from friends and family  Having headaches, aches  and pains, or stomach problems that won't go away  Feeling anger or rage  Fears of harming your baby  Lack of interest in your baby  Feeling worthless or guilty  No longer finding pleasure in things you used to  Having trouble thinking clearly or making decisions  Thinking about death or suicide  Depression after childbirth  You may be weepy and tired right after giving birth. These feelings are normal. They’re sometimes called the “baby blues.” These blues go away after 1 to 2 weeks. However, postpartum (meaning “after birth”) depression lasts much longer and is more severe than the baby blues. It can make you feel sad and hopeless. You may also fear that your baby will be harmed and worry about being a bad mother.   What causes postpartum depression?  The exact cause of postpartum depression is unknown. Changes in brain chemistry or structure are believed to play a big role in depression. It may be due to changes in your hormones during and after childbirth. You may also be tired from caring for your baby and adjusting to being a mother. All these factors may make you feel depressed. In some cases, your genes may also play a role.   Depression can be treated  There are many ways to treat postpartum depression. Talking to your healthcare provider is the first step toward feeling better.   When to call your healthcare provider   Call your healthcare provider if you:    Cry for no clear reason  Have trouble sleeping, eating, and making choices  Question if you can handle caring for a baby  Have intense feelings of sadness, anxiety, or despair that prevent you from being able to do your daily tasks  To learn more  National [place] of Mental Health, 570.669.8161, www.nimh.nih.gov  National [place] on Mental Illness, 215.711.9064, www.bar.org  M[facility] [place], 821.121.9112, www.nmha.org  National Suicide Hotline, 099-SUICIDE (189-961-8221)  National Suicide Prevention Lifeline, 469.158.3233,  www.suicidepreventionlifeline.org    [employer] last reviewed this educational content on 5/1/2020 © 2000-2020 The [employer]. [address] [place] [address], [place] [address]. All rights reserved. This information is not intended as a substitute for professional medical care. Always follow your healthcare professional's instructions.        Expressing Your Milk  Work, school, or even a late-night movie can require you to be away from your baby. This doesn't mean you have to give up breastfeeding. Feeding your baby from your breasts is ideal. If you must be away from your baby, you can express milk from your breast. Talk with your healthcare provider about the best ways to feed expressed breastmilk to your infant. But remember, don’t give your baby bottles or pacifiers until he or she is about 4 to 6 weeks old, unless required sooner for health reasons. This helps you both get a good start on breastfeeding. Your baby can get used to your natural nipple first.      Expressing by hand       Expressing with double pump      Always wash your hands before expressing milk from your breast.  Stimulating letdown  Hold a washcloth under very warm water and wring it out.  Place one warm washcloth over each breast to warm them.   Gently massage your breasts to stimulate the milk flow.  Start under the arm and move around the entire breast.   Use the backs of your fingernails to gently scratch the skin of your breasts downward from the outside toward your nipples.   If you’re away from your baby, looking at your baby’s picture can help your milk let down.    Expressing by hand or pump  Your lactation consultant can help you choose the best method for your needs. Here are some tips:   Expressing by hand reduces pressure in swollen or leaky breasts. It may be a good way to start a pumping session. If you need to give expressed milk to your baby in the first few days after delivery, hand expression can often help get more  colostrum than using a pump. Ask your nurse or midwife to teach you how to hand express.  Start expressing within 6 hours of separation from your baby in the hospital.  When  from your baby, it's best to express as often and as long as a baby would breastfeed. Newborns feed 8 to 12 times each 24 hours.  A pump gently pulls your nipple into the cup like a baby’s suck and can be the fastest way to express after your milk comes in. Pumps come in manual, battery-operated, and electric styles. To protect your breasts and the milk you pump, follow the instructions that come with your pump.  For sick or premature babies who aren't feeding at the breast, \"hands-on pumping\" is a special way to help be sure you make enough milk. Hands-on pumping involves a combination of both hand expression and an electrical pump.   Hand expressing while using a pump can increase the amount of milk you can pump. It can also increase the fat content of the pumped milk.  You can usually buy or rent a pump from a drugstore or medical equipment store. Check with your hospital to find out where you can buy or rent a pump.  Working and breastfeeding  Breastfeed your baby all of the time during your maternity leave. This helps set up your milk supply for the whole year.  When your baby is about 2 weeks old, start pumping after you feed the baby. You can freeze this expressed milk. It will help you build a supply for going back to work. Nursing plus pumping will help your breasts make more milk. Feeding your baby from your breasts is ideal. If you must be away from your baby, you can express milk from your breast. Talk with your healthcare provider about the best ways to feed expressed breastmilk to your infant.    Express milk during work breaks. This helps protect your milk supply. It also helps prevent engorged or leaking breasts.  Arrange to breastfeed at lunch if your childcare is nearby. If not, be sure to pump during your lunch  break.  Breastfeed before you leave for work and soon after you return home. Your partner may be able to make dinner while you feed the baby.  Breastfeed at night and on weekends. This will keep up your milk supply. Talk to your healthcare provider about the best ways to feed expressed breastmilk to your infant.    [employer] last reviewed this educational content on 6/1/2020 © 2000-2020 The [employer]. [address] [place] [address], [place] [address]. All rights reserved. This information is not intended as a substitute for professional medical care. Always follow your healthcare professional's instructions.        Storing Expressed Milk    You can express your milk and store it in clean containers. Your family or a sitter can feed it to the baby. This way, your baby gets the benefits of your milk even when you can't be there at feeding time.  Type of storage Storage times   Room temperature     At room temperature (up to 78°F or 26°C)  Tip: Keep the container clean, covered, and cool. 3 to 4 hours is best; 6 to 8 hours is acceptable under very clean conditions   Refrigerator     In a refrigerator (less than 39°F or less than 4°C)  Tip: Place milk in the back of the main section of the refrigerator. 72 hours is best; up to 8 days is acceptable under very clean conditions   Freezer      In a freezer (0°F or -17°C)  Tip: Store milk toward the back of the freezer. 6 months is best; 12 months is acceptable   Guidelines for milk storage  Always use a clean container to collect and store milk. Never pour warm expressed milk into a bottle with cold milk. And be sure to label and date each bottle or bag of milk. To store milk safely, see the chart above.  Warming stored milk  Thaw frozen milk in the refrigerator or in a bowl of warm water. It’s a good idea to warm refrigerated milk before using it. For your baby’s safety:  Use the oldest milk first.  Warm a container of milk by putting it in a bowl of warm (not hot)  water for a few minutes. Or use a bottle warmer set on low.  Gently swirl the milk to mix it. Then place a few drops on your wrist. The milk should be near room temperature.  Don’t put the milk in a microwave. This could create pockets of hot liquid that can burn your baby’s mouth.  [employer] last reviewed this educational content on 7/25/2018 © 2000-2020 The [employer], [employer]. [address] [place] [address], [place] [address]. All rights reserved. This information is not intended as a substitute for professional medical care. Always follow your healthcare professional's instructions.        Kegel Exercises  Kegel exercises are done to help strengthen the muscles in your pelvic floor. You don’t need special clothing or equipment. They’re easy to learn and simple to do. And if you do them right, no one can tell you’re doing them, so they can be done almost anywhere. Your healthcare provider, nurse, or physical therapist can answer any questions you have and help you get started.     A weak pelvic floor  The pelvic floor muscles may weaken due to aging, pregnancy and vaginal childbirth, injury, surgery, chronic cough, or lack of exercise. If the pelvic floor is weak, your bladder and other pelvic organs may sag out of place. The urethra may also open too easily and allow urine to leak out. Kegel exercises can help you strengthen your pelvic floor muscles. Then they can better support the pelvic organs and control urine flow.   How Kegel exercises are done  Try each of the Kegel exercises described below. When you’re doing them, try not to move your leg, buttock, or stomach muscles:   Contract as if you were stopping your urine stream. But do it when you’re not urinating.  Tighten your rectum as if trying not to pass gas. Contract your anus, but don’t move your buttocks.  You may place a finger or 2 in the vagina and squeeze your finger with your vagina to learn which muscles to tighten.  Try to hold each Kegel for a slow  count to 5. You probably won’t be able to hold them for that long at first. But keep practicing. It will get easier as your pelvic floor gets stronger. Eventually, special weights that you place in your vagina may be recommended to help make your Kegels even more effective. Talk to your healthcare provider if you have trouble doing Kegel exercises.   Helpful tips  Here are some tips to follow:  Do your Kegels as often as you can. The more you do them, the faster you’ll feel the results.  Pick an activity you do often as a reminder. For instance, do your Kegels every time you sit down.  Tighten your pelvic floor before you sneeze, get up from a chair, cough, laugh, or lift. This can help prevent urine, gas, or stool leakage.  [employer] last reviewed this educational content on 8/1/2020 © 2000-2020 [employer]. [address] [place] [address]. All rights reserved. This information is not intended as a substitute for professional medical care. Always follow your healthcare professional's instructions.   Birth Control Methods  Birth control methods are used to help prevent pregnancy. There are many different methods to choose from. Talk to your healthcare provider about which method is right for you. Be sure to ask your provider about the effectiveness of each method. Also ask about the benefits, risks, and side effects of each method.  Hormones  Some birth control methods work by releasing hormones such as progestin and estrogen. These methods include hormone implants, hormone shots, the vaginal ring, the patch, and birth control pills. They all work by stopping ovulation (release of the egg from the ovary). The implant is a small device that needs to be placed in the upper arm by a trained healthcare provider. It works for up to 3 years. Hormone injections must be repeated every 3 months. The vaginal ring must be replaced monthly (it can be removed during the fourth week of each cycle). The  patch must be replaced weekly (it is not worn during the fourth week of each cycle). Birth control pills must be taken every day. All of these methods are effective and can be stopped at any time.  Intrauterine device (IUD)  An IUD is a small, T-shaped device. It must be placed in the uterus by a trained healthcare provider. There are different types of IUDs available. They work by causing changes in the uterus that make it harder for sperm to reach the egg. Depending on the type of IUD you have, it may work for several years or longer. The IUD is a reversible birth control method. This means it can be removed at any time.  Condom  A condom is a sheath that forms a thin barrier between the penis and the vagina. It helps prevent pregnancy by keeping sperm from entering the vagina. When latex condoms are used, they have the added benefit of protecting against most STIs (sexually transmitted infections). Condoms are used each time there is sexual intercourse and should be discarded after each use. Ask your healthcare provider about the different types of condoms available. These include both the male condom and female condom.  Spermicide  Spermicides come as foams, jellies, creams, suppositories, and tablets.  They help prevent pregnancy by killing sperm. When used alone they are not that reliable. They work best when combined with other birth control methods such as diaphragms and cervical caps.  Sponge, diaphragm, and cervical cap  All of these methods help prevent pregnancy by covering the opening of the uterus (cervix). This prevents sperm from passing through.  The sponge contains spermicide. It can be bought over the counter. The sponge must be left in place for at least 6 hours after the last time you have sex. However, it should not stay in place for more than 24 hours. It should be discarded after it is used.  The diaphragm and cervical cap must be fitted and prescribed by your healthcare provider. Both are  used with spermicide. The diaphragm must be left in place for at least 6 hours after sex. However, it should not stay in place for more than 24 hours. It can be washed and reused. The cervical cap must be left in place for at least 6 hours after sex. However, it should not stay in place for more than 48 hours. It can be washed and reused.  Withdrawal method  This is when the man pulls his penis out of the vagina just before ejaculation (“coming”). This lowers the amount of sperm entering the vagina. Be aware that fluids released just before ejaculation often still contain some sperm, so this method is not as reliable as certain other methods.  Rhythm method  This method requires that you know when in your menstrual cycle you are likely to become pregnant. Then, you avoid sex during those days. This requires careful planning and good discipline. Your healthcare provider can explain more about how this works.  Tubal ligation and vasectomy  These are surgical methods to prevent pregnancy. Tubal ligation is an option for women. The fallopian tubes are blocked or cut (ligated). This keeps the egg from passing into the uterus or sperm from reaching the egg. Vasectomy is an option for men. The tubes that normally carry sperm to the penis are either closed or blocked. Both tubal ligation and vasectomy are permanent birth control methods. This means reversal is either not possible or unlikely to work. They are good choices for women and men who know that they do not want to have children in the future.  [employer] last reviewed this educational content on 11/1/2017  © 8094-0536 [employer]. [address] [place] [address], [place] [address]. All rights reserved. This information is not intended as a substitute for professional medical care. Always follow your healthcare professional's instructions.        How Birth Control Works  Birth control prevents pregnancy by preventing conception. Some methods prevent an egg from  maturing. Some keep the sperm and egg from meeting. And some methods work in both ways.   Preventing ovulation  Certain hormones help prevent an egg from maturing and being released. Hormone methods include:   Birth control pills  Skin patches  Contraceptive vaginal rings  Injections  Preventing sperm and egg from meeting  Methods that prevent the sperm and egg from joining include:  Barrier methods, such as the condom, the diaphragm, and the cervical cap  Spermicide  The IUD (intrauterine device)  Sterilization  Natural family planning  Some types of hormone methods  StayWell last reviewed this educational content on 4/1/2020 © 2000-2020 [employer]. [address] [place] [id number]. All rights reserved. This information is not intended as a substitute for professional medical care. Always follow your healthcare professional's instructions.        Birth Control: Time-Release Hormones     Time-release hormones require a doctor's prescription.   Certain hormones can help prevent pregnancy. Hormones like the ones used in birth control pills can be taken in other forms. These must be prescribed by your healthcare provider. Because there’s very little for you to do, you may find one of these methods easier to stick to than pills. Side effects for this method will vary depending on the type of time-release hormone you use. Talk to your healthcare provider for more information.   Pregnancy rates  Talk to your healthcare provider about the effectiveness of this birth control method.  Using time-release hormones  Methods to deliver hormones include:  A skin patch placed on your stomach, buttocks, arm, or shoulder. You replace the patch weekly.  A ring that you insert in your vagina, leave in for 3 weeks, and remove for 1 week.  Injections given in your arm or buttocks once every 3 months by your healthcare provider.  An implant placed under the skin in the upper arm by your healthcare provider. This  can be left in place for up to 3 years.  The progestin IUDs placed by your healthcare provider. These can be left in place for 3 to 5 years depending on which one is chosen.  Pros  Lowest pregnancy rate of the birth control methods that can be reversed  No interruption to sex  Easy to use  Don’t require taking a pill each day  May decrease menstrual cramps, menstrual flow, and acne    Cons  Don't protect against sexually transmitted infections (STIs)  May cause irregular periods  May cause side effects such as nausea, weight gain, headaches, breast tenderness, fatigue, or mood changes (these often go away within 3 months)  May take up to a year for you to become fertile (able to get pregnant) after stopping injections  May increase the risk of blood clots, heart attack, and stroke    Time-release hormones may not be for you  Time-release hormones may not be for you if:  You are a smoker and over age 35  You have high blood pressure, gallbladder disease, liver disease, certain lipid disorders, cerebrovascular disease (stroke), or heart disease  You have diabetes, migraines, clot in a vein or artery (thromboembolic disorder), lupus, or take medicines that may interfere with the hormones  In these cases, discuss the risks with your healthcare provider.  1-4 All last reviewed this educational content on 4/1/2020  © 3074-7443 [employer]. [address] [place] [address], [place] [address]. All rights reserved. This information is not intended as a substitute for professional medical care. Always follow your healthcare professional's instructions.        Birth Control: Natural Family Planning     To use NFP, you keep daily records of the signs that show when you are fertile.   Natural family planning (NFP) is based on a woman's awareness of when she is likely to become pregnant (fertile). By learning how to tell when you're fertile, you can know when to not have sex. This can help prevent pregnancy. To learn NFP,  it's advised that you take a class or work with a qualified teacher.   Pregnancy rates  Talk to your healthcare provider about how well this birth control method works.   Using NFP  A woman is fertile only during a certain part of her monthly cycle--just before and during ovulation. By learning when you ovulate, you can know when you're likely to be fertile. You estimate when you're ovulating by observing and keeping track of certain physical signs. You can then avoid sex or use a barrier method during that fertile time. But be aware that each woman's cycle and signs are different, and no woman's cycle is perfectly regular.   Pros  Both partners share responsibility  No known health risks  No side effects  Is inexpensive or free  If you abstain from sex during fertile periods, this method is approved by all [employer] communities  Easy to stop if you decide you want to become pregnant    Cons  Takes time to learn  Requires daily observation and charting  Requires abstaining from sex or using a barrier method during fertile periods (nine or more days per month)  Does not protect against sexually transmitted infections (STIs)    When natural family planning may not be for you  Natural family planning may not be for you if:  You don't have the full cooperation of your partner  You haven't received training from a qualified teacher  Your periods are not regular  You take certain medicines or should not get pregnant due to a medical condition  You just started having periods or you are approaching menopause  StayWell last reviewed this educational content on 5/1/2020  © 2420-2683 [employer]. [address] [place] [address], [place] [address]. All rights reserved. This information is not intended as a substitute for professional medical care. Always follow your healthcare professional's instructions.        Birth Control: IUD (Intrauterine Device)    The IUD (intrauterine device) is small, flexible, and T-shaped. A  trained healthcare provider places it in the uterus. The IUD is one of the most effective birth control methods. It's also reversible. This means it can be removed at any time by a trained healthcare provider. New IUDs are safe and don't have the risks of older types of IUDs.   Pregnancy rates  Talk to your healthcare provider about the effectiveness of this birth control method.  Types of IUDs  IUD insertion is done in the healthcare provider’s office. Two types of IUDs are available:  The copper IUD releases a small amount of copper into the uterus. The copper makes it harder for sperm to reach the egg. The device works for at least 10 years.  The progestin IUD releases a hormone called progestin. It causes changes in the uterus to help prevent pregnancy. The device works for 3 to 5 years, depending on which device is chosen. It may be recommended for women who have anemia or heavy and painful periods.  IUDs have thin strings that hang from the opening of the uterus into the vagina. This lets you check that the IUD stays in place.   Things to know about IUDs  IUDs can be used by women who have never been pregnant or by women with a history of sexually transmitted infections (STIs) or tubal pregnancy.  It won't move from the uterus to any other part of the body.  There is a slight risk of the device coming out of the vagina (expulsion).  It may not work in women who have an abnormally shaped uterus.  A copper IUD may cause heavier periods and cramping.  Progestin IUD may cause light periods or no periods at all (irregular bleeding or spotting is possible and normal during first 3 to 6 months).  If you get a sexually transmitted infection with an IUD in place, symptoms may be more severe.    What to report to your healthcare provider  Be sure your healthcare provider knows if you have:  A sexually transmitted infection (STI) or possible STI  Liver problems  Blood clots (for progestin IUD only)  Breast cancer or a  history of breast cancer (progestin IUD only)  [employer] last reviewed this educational content on 5/1/2020 © 2000-2020 [employer]. [address] [place] [address], [place] [address]. All rights reserved. This information is not intended as a substitute for professional medical care. Always follow your healthcare professional's instructions.        Levonorgestrel intrauterine device (IUD)  Brand Names: Kyleena, LILETTA, Mirena, Johnna  What is this medicine?  LEVONORGESTREL IUD (HOA voe nor vita trel) is a contraceptive (birth control) device. The device is placed inside the uterus by a healthcare professional. It is used to prevent pregnancy. This device can also be used to treat heavy bleeding that occurs during your period.  How should I use this medicine?  This device is placed inside the uterus by a health care professional.  Talk to your pediatrician regarding the use of this medicine in children. Special care may be needed.  What side effects may I notice from receiving this medicine?  Side effects that you should report to your doctor or health care professional as soon as possible:  allergic reactions like skin rash, itching or hives, swelling of the face, lips, or tongue  fever, flu-like symptoms  genital sores  high blood pressure  no menstrual period for 6 weeks during use  pain, swelling, warmth in the leg  pelvic pain or tenderness  severe or sudden headache  signs of pregnancy  stomach cramping  sudden shortness of breath  trouble with balance, talking, or walking  unusual vaginal bleeding, discharge  yellowing of the eyes or skin  Side effects that usually do not require medical attention (report to your doctor or health care professional if they continue or are bothersome):  acne  breast pain  change in sex drive or performance  changes in weight  cramping, dizziness, or faintness while the device is being inserted  headache  irregular menstrual bleeding within first 3 to 6 months of  use  nausea  What may interact with this medicine?  Do not take this medicine with any of the following medications:  amprenavir  bosentan  fosamprenavir  This medicine may also interact with the following medications:  aprepitant  armodafinil  barbiturate medicines for inducing sleep or treating seizures  bexarotene  boceprevir  griseofulvin  medicines to treat seizures like carbamazepine, ethotoin, felbamate, oxcarbazepine, phenytoin, topiramate  modafinil  pioglitazone  rifabutin  rifampin  rifapentine  some medicines to treat HIV infection like atazanavir, efavirenz, indinavir, lopinavir, nelfinavir, tipranavir, ritonavir  Sadler's wort  warfarin  What if I miss a dose?  This does not apply. Depending on the brand of device you have inserted, the device will need to be replaced every 3 to 6 years if you wish to continue using this type of birth control.  Where should I keep my medicine?  This does not apply.  What should I tell my health care provider before I take this medicine?  They need to know if you have any of these conditions:  abnormal Pap smear  cancer of the breast, uterus, or cervix  diabetes  endometritis  genital or pelvic infection now or in the past  have more than one sexual partner or your partner has more than one partner  heart disease  history of an ectopic or tubal pregnancy  immune system problems  IUD in place  liver disease or tumor  problems with blood clots or take blood-thinners  seizures  use intravenous drugs  uterus of unusual shape  vaginal bleeding that has not been explained  an unusual or allergic reaction to levonorgestrel, other hormones, silicone, or polyethylene, medicines, foods, dyes, or preservatives  pregnant or trying to get pregnant  breast-feeding  What should I watch for while using this medicine?  Visit your doctor or health care professional for regular check ups. See your doctor if you or your partner has sexual contact with others, becomes HIV positive, or  gets a sexual transmitted disease.  This product does not protect you against HIV infection (AIDS) or other sexually transmitted diseases.  You can check the placement of the IUD yourself by reaching up to the top of your vagina with clean fingers to feel the threads. Do not pull on the threads. It is a good habit to check placement after each menstrual period. Call your doctor right away if you feel more of the IUD than just the threads or if you cannot feel the threads at all.  The IUD may come out by itself. You may become pregnant if the device comes out. If you notice that the IUD has come out use a backup birth control method like condoms and call your health care provider.  Using tampons will not change the position of the IUD and are okay to use during your period.  This IUD can be safely scanned with magnetic resonance imaging (MRI) only under specific conditions. Before you have an MRI, tell your healthcare provider that you have an IUD in place, and which type of IUD you have in place.  NOTE:This sheet is a summary. It may not cover all possible information. If you have questions about this medicine, talk to your doctor, pharmacist, or health care provider. Copyright© 2020 [employer]        Etonogestrel implant  What is this medicine?  ETONOGESTREL (et oh rachel CHALINO trel) is a contraceptive (birth control) device. It is used to prevent pregnancy. It can be used for up to 3 years.  How should I use this medicine?  This device is inserted just under the skin on the inner side of your upper arm by a health care professional.  Talk to your pediatrician regarding the use of this medicine in children. Special care may be needed.  What side effects may I notice from receiving this medicine?  Side effects that you should report to your doctor or health care professional as soon as possible:  allergic reactions like skin rash, itching or hives, swelling of the face, lips, or tongue  breast lumps  changes in emotions or  moods  depressed mood  heavy or prolonged menstrual bleeding  pain, irritation, swelling, or bruising at the insertion site  scar at site of insertion  signs of infection at the insertion site such as fever, and skin redness, pain or discharge  signs of pregnancy  signs and symptoms of a blood clot such as breathing problems; changes in vision; chest pain; severe, sudden headache; pain, swelling, warmth in the leg; trouble speaking; sudden numbness or weakness of the face, arm or leg  signs and symptoms of liver injury like dark yellow or brown urine; general ill feeling or flu-like symptoms; light-colored stools; loss of appetite; nausea; right upper belly pain; unusually weak or tired; yellowing of the eyes or skin  unusual vaginal bleeding, discharge  signs and symptoms of a stroke like changes in vision; confusion; trouble speaking or understanding; severe headaches; sudden numbness or weakness of the face, arm or leg; trouble walking; dizziness; loss of balance or coordination  Side effects that usually do not require medical attention (report to your doctor or health care professional if they continue or are bothersome):  acne  back pain  breast pain  changes in weight  dizziness  general ill feeling or flu-like symptoms  headache  irregular menstrual bleeding  nausea  sore throat  vaginal irritation or inflammation  What may interact with this medicine?  Do not take this medicine with any of the following medications:  amprenavir  fosamprenavir  This medicine may also interact with the following medications:  acitretin  aprepitant  armodafinil  bexarotene  bosentan  carbamazepine  certain medicines for fungal infections like fluconazole, ketoconazole, itraconazole and voriconazole  certain medicines to treat hepatitis, HIV or AIDS  cyclosporine  felbamate  griseofulvin  lamotrigine  modafinil  oxcarbazepine  phenobarbital  phenytoin  primidone  rifabutin  rifampin  rifapentine  [facility]  wort  topiramate  What if I miss a dose?  This does not apply.  Where should I keep my medicine?  This drug is given in a hospital or clinic and will not be stored at home.  What should I tell my health care provider before I take this medicine?  They need to know if you have any of these conditions:  abnormal vaginal bleeding  blood vessel disease or blood clots  breast, cervical, endometrial, ovarian, liver, or uterine cancer  diabetes  gallbladder disease  heart disease or recent heart attack  high blood pressure  high cholesterol or triglycerides  kidney disease  liver disease  migraine headaches  seizures  stroke  tobacco smoker  an unusual or allergic reaction to etonogestrel, anesthetics or antiseptics, other medicines, foods, dyes, or preservatives  pregnant or trying to get pregnant  breast-feeding  What should I watch for while using this medicine?  This product does not protect you against HIV infection (AIDS) or other sexually transmitted diseases.  You should be able to feel the implant by pressing your fingertips over the skin where it was inserted. Contact your doctor if you cannot feel the implant, and use a non-hormonal birth control method (such as condoms) until your doctor confirms that the implant is in place. Contact your doctor if you think that the implant may have broken or become bent while in your arm.  You will receive a user card from your health care provider after the implant is inserted. The card is a record of the location of the implant in your upper arm and when it should be removed. Keep this card with your health records.  NOTE:This sheet is a summary. It may not cover all possible information. If you have questions about this medicine, talk to your doctor, pharmacist, or health care provider. Copyright© 2020 [employer]

## 2024-12-04 NOTE — PROGRESS NOTES
Chief Complaint   Patient presents with    Postpartum Care     Vaginal delivery,  baby female 8 lb 6 oz         HPI:   Corinne is 34 year old , here today for 6-week postpartum visit.  Type and date of Delivery: , Complications: PPH w/ resultant anemia and gestational HTN  See Delivery Summary for baby's gender and weight  Perineum: no concerns, light spotting  Feeding Method: continues without difficulty  Bonding: well  Maternal sleep: getting into a regular pattern  Sexual activity resumed: no. Contraceptive Method: plans Mirena, will schedule  Postpartum Depression screen: denies. States anxiety is well-managed    Pt offered for MA to be present during exam and patient declined.    HISTORY:  Patient Active Problem List   Diagnosis    Leg pain, posterior, left    Chronic left-sided lumbar radiculopathy    Major depressive disorder    Asthma (HCC)    Anxiety disorder    Drug-induced sexual dysfunction (HCC)    Low TSH level    At risk for postpartum depression    Anxiety during pregnancy (HCC)    Postpartum anemia (HCC)    Gestational hypertension, third trimester (HCC)     (spontaneous vaginal delivery) (HCC)    Postpartum anxiety (HCC)       Medications (Active prior to today's visit):  Current Outpatient Medications   Medication Sig Dispense Refill    sertraline (ZOLOFT) 50 MG Oral Tab Take 1.5 tablets (75 mg total) by mouth every morning. 135 tablet 1    buPROPion ER (WELLBUTRIN XL) 150 MG Oral Tablet 24 Hr Take 1 tablet (150 mg total) by mouth nightly. 90 tablet 1       Allergies:   Allergies[1]    PHYSICAL EXAM:   Vitals:    24 1525   BP: 127/82   Pulse: 73       Pre-pregnancy weight 175 lbs  Today's weight 173 lbs    Physical Exam  Vitals reviewed.   Constitutional:       Appearance: Normal appearance.   HENT:      Head: Normocephalic.   Pulmonary:      Effort: Pulmonary effort is normal.   Abdominal:      Comments: Rectus abdominus muscles <1 finger-breath diastasis  Uterus palpates  within the pelvis by abdominal exam     Genitourinary:     Comments: Well-healed  Good tone with Kegel's    Neurological:      Mental Status: She is alert and oriented to person, place, and time.   Psychiatric:         Behavior: Behavior normal.         Thought Content: Thought content normal.         Judgment: Judgment normal.            No results found for this or any previous visit (from the past 24 hours).       ASSESSMENT/PLAN:   Corinne was seen today for postpartum care.    Diagnoses and all orders for this visit:    Postpartum care and examination (HCC)    Postpartum anemia (HCC)  -     CBC, Platelet; No Differential; Future            6 weeks postpartum. Stable, plans to return for Mirena next week. Continues to breastfeed without difficulty    Next annual due: 3 months  Follow-up/Return to clinic: 1 week for IUD removal    Counseling:   Postpartum teaching including maternal and family adaptation, sleep/rest strategies, lactation and weaning (if applicable), reaching ideal body weight, sexuality/contraception, importance of Kegel's and abdominal exercises, continuation of prenatal vitamins, and signs/symptoms of postpartum depression  IUD counseling including: unpredictable and irregular vaginal bleeding (likely decreased frequency and amount of menses), bloating and cramping, increased appetite which can lead to increased weight gain, headaches, moodiness or depression or decrease in sex drive.  Patient verbalized understanding, All questions answered. No barriers to learning identified         [1] No Known Allergies

## 2024-12-10 ENCOUNTER — OFFICE VISIT (OUTPATIENT)
Dept: OBGYN CLINIC | Facility: CLINIC | Age: 34
End: 2024-12-10

## 2024-12-10 VITALS — HEART RATE: 76 BPM | DIASTOLIC BLOOD PRESSURE: 82 MMHG | SYSTOLIC BLOOD PRESSURE: 127 MMHG

## 2024-12-10 DIAGNOSIS — Z30.430 ENCOUNTER FOR INSERTION OF INTRAUTERINE CONTRACEPTIVE DEVICE (IUD): Primary | ICD-10-CM

## 2024-12-10 DIAGNOSIS — Z01.818 PREPROCEDURAL EXAMINATION: ICD-10-CM

## 2024-12-10 LAB
CONTROL LINE PRESENT WITH A CLEAR BACKGROUND (YES/NO): YES YES/NO
KIT LOT #: NORMAL NUMERIC
PREGNANCY TEST, URINE: NEGATIVE

## 2024-12-10 PROCEDURE — 81025 URINE PREGNANCY TEST: CPT | Performed by: ADVANCED PRACTICE MIDWIFE

## 2024-12-10 PROCEDURE — 58300 INSERT INTRAUTERINE DEVICE: CPT | Performed by: ADVANCED PRACTICE MIDWIFE

## 2024-12-10 NOTE — PROCEDURES
IUD Insertion     Pregnancy Results: negative from urine test   Birth control method(s) used: abstinence         Consent signed.  Procedure discussed with the patient in detail including indication, risks, benefits, alternatives and complications.  Pt desires Johnna due to poor reaction to systemic hormones  Only needs for  ashort period of time for contraception partner is having a vasectomy      Pelvic Exam Findings:  Lesion description: cervix grossly normal    Procedure:  Speculum placed in the vagina.  Betadine wash of vagina and cervix.  Single tooth tenaculum was placed at the 12 o'clock position.  Uterus sounded to 8 cm.  Johnna IUD was placed without difficulty.  Strings cut at 3 cm.  Single tooth tenaculum removed.  Pressure used to achieve hemostasis.  Good hemostasis noted.  GC/CHL screen declined by patient  Patient tolerated procedure well.      Visit Plan:  IUD surveillance was discussed with the patient.

## 2025-01-13 ENCOUNTER — OFFICE VISIT (OUTPATIENT)
Dept: OBGYN CLINIC | Facility: CLINIC | Age: 35
End: 2025-01-13
Payer: COMMERCIAL

## 2025-01-13 VITALS
SYSTOLIC BLOOD PRESSURE: 114 MMHG | HEIGHT: 69 IN | DIASTOLIC BLOOD PRESSURE: 79 MMHG | HEART RATE: 79 BPM | WEIGHT: 175 LBS | BODY MASS INDEX: 25.92 KG/M2

## 2025-01-13 DIAGNOSIS — Z30.431 IUD CHECK UP: Primary | ICD-10-CM

## 2025-01-13 DIAGNOSIS — N89.8 VAGINAL DISCHARGE: ICD-10-CM

## 2025-01-13 PROCEDURE — 99213 OFFICE O/P EST LOW 20 MIN: CPT | Performed by: ADVANCED PRACTICE MIDWIFE

## 2025-01-13 NOTE — PROGRESS NOTES
Subjective:   Patient ID: Corinne Denise Macnichol is a 34 year old female here for an IUD check. She is having some vaginal odor but denies any vaginal discharge or irritation. Overall she is happy with the ID. She has been experiencing dizziness up to 6 times a day. She feels the room spinning. She cannot identify alleviating or aggravating factors.     HPI    History/Other:   Review of Systems   Constitutional: Negative.    HENT: Negative.     Respiratory: Negative.     Cardiovascular: Negative.    Gastrointestinal: Negative.    Neurological:  Positive for dizziness. Negative for syncope, weakness, light-headedness, numbness and headaches.   Psychiatric/Behavioral: Negative.       Current Outpatient Medications   Medication Sig Dispense Refill    buPROPion ER (WELLBUTRIN XL) 150 MG Oral Tablet 24 Hr Take 1 tablet (150 mg total) by mouth nightly. 90 tablet 1    sertraline (ZOLOFT) 50 MG Oral Tab Take 1.5 tablets (75 mg total) by mouth every morning. 135 tablet 1     Allergies:Allergies[1]    Objective:   Physical Exam  Constitutional:       Appearance: Normal appearance. She is normal weight.   Cardiovascular:      Rate and Rhythm: Normal rate.   Pulmonary:      Effort: Pulmonary effort is normal.   Genitourinary:     General: Normal vulva.      Vagina: Vaginal discharge present. No erythema or bleeding.      Cervix: Normal.            Comments: IUD strings visualized  Neurological:      Mental Status: She is alert and oriented to person, place, and time.   Psychiatric:         Mood and Affect: Mood normal.         Behavior: Behavior normal.         Thought Content: Thought content normal.         Judgment: Judgment normal.         Assessment & Plan:   1. IUD check up    2. Vaginal discharge      Referred to primary for follow up regarding symptoms of vertigo    Orders Placed This Encounter   Procedures    Vaginitis Vaginosis PCR Panel       Meds This Visit:  Requested Prescriptions      No prescriptions requested  or ordered in this encounter       Imaging & Referrals:  None    DOMINIQUE Thakkar under direct supervision and in collaboration with Iram Rojas CNM        [1] No Known Allergies

## 2025-01-14 LAB
BV BACTERIA DNA VAG QL NAA+PROBE: NEGATIVE
C GLABRATA DNA VAG QL NAA+PROBE: NEGATIVE
C KRUSEI DNA VAG QL NAA+PROBE: NEGATIVE
CANDIDA DNA VAG QL NAA+PROBE: NEGATIVE
T VAGINALIS DNA VAG QL NAA+PROBE: NEGATIVE

## (undated) DIAGNOSIS — R74.8 ELEVATED ALKALINE PHOSPHATASE LEVEL: Primary | ICD-10-CM

## (undated) NOTE — LETTER
5/10/2022              Corinne Denise Macnichol        [address] [facility] [address]         To Whom It May Concern,    Please arrange accommodations for patient. Reduced work hours due to pregnancy complications. If you have any questions or concerns please contact my office.       Sincerely,    Jalil Gregorio, [address] [facility] [address]  [phone]

## (undated) NOTE — LETTER
8/31/2021              [address] [place] [address]        [address] [address]         To Whom It May Concern,    Olesya Cardenas is currently under my medical care.  Patient is to discontinue Welbutrin once pregnant but may contin

## (undated) NOTE — LETTER
VACCINE ADMINISTRATION RECORD  PARENT / GUARDIAN APPROVAL  Date: 3/22/2022  Vaccine administered to: Priti Arevalo     : 10/5/1990    MRN: [id number]    A copy of the appropriate Centers for Disease Control and Prevention Vaccine Information statement has been provided. I have read or have had explained the information about the diseases and the vaccines listed below. There was an opportunity to ask questions and any questions were answered satisfactorily. I believe that I understand the benefits and risks of the vaccine cited and ask that the vaccine(s) listed below be given to me or to the person named above (for whom I am authorized to make this request). VACCINES ADMINISTERED:  Tdap    I have read and hereby agree to be bound by the terms of this agreement as stated above. My signature is valid until revoked by me in writing.   This document is signed by Swapna Zavaleta, relationship: Self on 3/22/2022.:                                                                                                                                         Parent / Pryor Harada Signature                                                Date

## (undated) NOTE — Clinical Note
Please give patient PT information for [facility] in [facility]. Follow-up 4 weeks in office.  Thanks

## (undated) NOTE — LETTER
VACCINE ADMINISTRATION RECORD  PARENT / GUARDIAN APPROVAL  Date: 2024  Vaccine administered to: Corinne Denise Macnichol     : 10/5/1990    MRN: [id number]    A copy of the appropriate Centers for Disease Control and Prevention Vaccine Information statement has been provided. I have read or have had explained the information about the diseases and the vaccines listed below. There was an opportunity to ask questions and any questions were answered satisfactorily. I believe that I understand the benefits and risks of the vaccine cited and ask that the vaccine(s) listed below be given to me or to the person named above (for whom I am authorized to make this request).    VACCINES ADMINISTERED:  Tdap    I have read and hereby agree to be bound by the terms of this agreement as stated above. My signature is valid until revoked by me in writing.  This document is signed by self, relationship: Self on 2024.:                                                                                                                                         Parent / Guardian Signature                                                Date    Paola HERRON CMA served as a witness to authentication that the identity of the person signing electronically is in fact the person represented as signing.    This document was generated by Paola HERRON CMA on 2024.

## (undated) NOTE — LETTER
[facility]  Administration of Anesthesia  1. I, Corinne [employer], or _________________________________ acting on her behalf, (Patient) (Dependent/Representative) request to receive anesthesia for my pending procedure/operation/treatment. A physician (anesthesiologist) alone or an anesthesiologist working with a nurse anesthetist may administer my anesthesia. 2. I understand that my anesthesiologist is not an employee or agent of the hospital, but is an independent medical practitioner who has been permitted to use its facilities for the care and treatment of his/her patients. 3. I acknowledge that a physician from [facility] or their designate(s), recommended anesthesia for me using her/his medical judgment. The type(s) of anesthesia I may receive include:                a) General Anesthesia, b) Spinal/Epidural Anesthesia, c) Regional Anesthesia or d) Monitored Anesthesia Care. 4. If my spinal, regional or monitored anesthesia care (local) is not satisfactory for my comfort, or if my medical condition requires, I consent to the administration of general anesthesia. 5. I am aware that the practice of anesthesiology is not an exact science and that some foreseeable risks or consequences may occur. Some common risks/consequences include sore throat and hoarseness, nausea and vomiting, muscle soreness, backache, damage to the mouth/teeth/vocal cords and eye injury. I understand that more rare but serious potential risks of anesthesia include blood pressure changes, drug reactions, cardiac arrest, brain damage, paralysis or death. These risks apply to whether I have general, spinal/epidural, regional or monitored anesthesia care. 6. OBSTETRIC PATIENTS: Specific risks/consequences of spinal/epidural anesthesia may include itching, low blood pressure, difficulty urinating, slowing of the baby's heart rate and headache.  Rare risks include infections, high spinal block, spinal bleeding, seizure, cardiac arrest and death. 7. AWARENESS: I understand that it is possible (but unlikely) to have explicit memory of events from the operating room while under general anesthesia. 8. ELECTROCONVULSIVE THERAPY PATIENTS: This consent serves for all treatments in a single course of therapy. 9. I understand that I must inform my anesthesiologist when I last ate and/or drank to minimize the risk of anesthesia. 10. If I am pregnant, or may pregnant, I understand that elective surgery should be postponed until after the baby is born. Anesthetics cross the placenta and may temporarily anesthetize the baby. Although fetal complications of anesthesia during pregnancy are rare, they may include birth defects, premature labor, brain damage and death. 11. I certify that I informed the anesthesiologist, to the best of my ability, about medication I take including blood thinners, anticoagulants, herbal remedies, narcotics and recreational drugs (e.g. cocaine, marijuana, PCP). Failure to inform my anesthesiologist about these medicines may increase my risk of anesthetic complications. The nature and purpose of my anesthetic management was explained to me. I had the opportunity to ask questions and the answers and information provided meet my satisfaction.   I retain the right to withdraw this consent at any time prior to the administration of said anesthetic.    ___________________________________________________           _____________________________________________________  Patient Signature                                                                                      Witness Signature                ___________________________________________________           _____________________________________________________  Date/Time                                                                                               Responsible person in case of minor/ unconscious pt /Relationship    My signature below affirms that prior to the time of the procedure, I have explained to the patient and/or his/her guardian, the risks and benefits of undergoing anesthesia, as well as any reasonable alternatives.     ___________________________________________________            _____________________________________________________  Physician Signature                            Date/Time  Patient Name: Rocio Hardy     : 10/5/1990     Printed: 2022      Medical Record #: [id number]                              Page 1 of 1    ----------ANESTHESIA CONSENT----------

## (undated) NOTE — Clinical Note
Thank you for the consult. I saw Ms Mesa in the endocrine/diabetes clinic today. Please see attached my note. Please feel free to contact me with any questions. Thanks!

## (undated) NOTE — LETTER
AUTHORIZATION FOR SURGICAL OPERATION OR OTHER PROCEDURE    1. I hereby authorize [facility], , and [facility] staff assigned to my case to perform the following operation and/or procedure at the [facility] site:    _______________________________________________________________________________________________    Iud insertion   _______________________________________________________________________________________________    2.  My physician has explained the nature and purpose of the operation or other procedure, possible alternative methods of treatment, the risks involved, and the possibility of complication to me.  I acknowledge that no guarantee has been made as to the result that may be obtained.  3.  I recognize that, during the course of this operation, or other procedure, unforseen conditions may necessitate additional or different procedure than those listed above.  I, therefore, further authorize and request that the above named physician, his/her physician assistants or designees perform such procedures as are, in his/her professional opinion, necessary and desirable.  4.  Any tissue or organs removed in the operation or other procedure may be disposed of by and at the discretion of the [facility] and [facility].  5.  I understand that in the event of a medical emergency, I will be transported by local paramedics to [facility] or other hospital emergency department.  6.  I certify that I have read and fully understand the above consent to operation and/or other procedure.    7.  I acknowledge that my physician has explained sedation/analgesia administration to me including the risks and benefits.  I consent to the administration of sedation/analgesia as may be necessary or desirable in the judgement of my physician.    Witness signature: ___________________________________________________ Date:  ______/______/_____                     Time:  ________ A.M.  P.M.       Patient Name:  ______________________________________________________  (please print)      Patient signature:  ___________________________________________________             Relationship to Patient:           []  Parent    Responsible person                          []  Spouse  In case of minor or                    [] Other  _____________   Incompetent name:  __________________________________________________                               (please print)      _____________      Responsible person  In case of minor or  Incompetent signature:  _______________________________________________    Statement of Physician  My signature below affirms that prior to the time of the procedure, I have explained to the patient and/or his/her guardian, the risks and benefits involved in the proposed treatment and any reasonable alternative to the proposed treatment.  I have also explained the risks and benefits involved in the refusal of the proposed treatment and have answered the patient's questions.                        Date:  ______/______/_______  Provider                      Signature:  __________________________________________________________       Time:  ___________ A.M    P.M.